# Patient Record
Sex: MALE | Race: OTHER | HISPANIC OR LATINO | Employment: FULL TIME | URBAN - METROPOLITAN AREA
[De-identification: names, ages, dates, MRNs, and addresses within clinical notes are randomized per-mention and may not be internally consistent; named-entity substitution may affect disease eponyms.]

---

## 2018-01-17 ENCOUNTER — APPOINTMENT (EMERGENCY)
Dept: RADIOLOGY | Facility: HOSPITAL | Age: 33
End: 2018-01-17
Payer: COMMERCIAL

## 2018-01-17 ENCOUNTER — HOSPITAL ENCOUNTER (EMERGENCY)
Facility: HOSPITAL | Age: 33
Discharge: HOME | End: 2018-01-17
Admitting: EMERGENCY MEDICINE
Payer: COMMERCIAL

## 2018-01-17 VITALS
DIASTOLIC BLOOD PRESSURE: 70 MMHG | TEMPERATURE: 98.5 F | OXYGEN SATURATION: 97 % | WEIGHT: 253.4 LBS | HEART RATE: 104 BPM | SYSTOLIC BLOOD PRESSURE: 156 MMHG | RESPIRATION RATE: 20 BRPM

## 2018-01-17 DIAGNOSIS — J40 BRONCHITIS: Primary | ICD-10-CM

## 2018-01-17 PROCEDURE — 99283 EMERGENCY DEPT VISIT LOW MDM: CPT

## 2018-01-17 PROCEDURE — 71046 X-RAY EXAM CHEST 2 VIEWS: CPT

## 2018-01-17 RX ORDER — BENZONATATE 100 MG/1
100 CAPSULE ORAL 3 TIMES DAILY PRN
Qty: 15 CAPSULE | Refills: 0 | Status: SHIPPED | OUTPATIENT
Start: 2018-01-17 | End: 2018-01-22

## 2018-01-17 RX ORDER — ALBUTEROL SULFATE 90 UG/1
2 AEROSOL, METERED RESPIRATORY (INHALATION) EVERY 4 HOURS
COMMUNITY
Start: 2017-12-27 | End: 2018-02-19 | Stop reason: ALTCHOICE

## 2018-01-17 RX ORDER — ALBUTEROL SULFATE 2.5 MG/3ML
2.5 SOLUTION RESPIRATORY (INHALATION) EVERY 6 HOURS PRN
COMMUNITY
End: 2018-02-19 | Stop reason: ALTCHOICE

## 2018-01-17 RX ORDER — GUAIFENESIN 600 MG
600 TABLET, EXTENDED RELEASE 12 HR ORAL EVERY 12 HOURS SCHEDULED
Qty: 28 TABLET | Refills: 0 | Status: SHIPPED | OUTPATIENT
Start: 2018-01-17 | End: 2018-01-31

## 2018-01-17 NOTE — ED PROVIDER NOTES
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  Patient reports that 1 week ago he was cleaning out a room around a cat litter box where he is allergic to cats  Patient states that since then he has been having irritation in his lungs  Patient reports that he feels a "vibrating sensation" when he coughs  States that he also has shortness of breath likely secondary to his cough  Patient also reports nausea  Patient states that he used his nebulizer treatment yesterday with relief  Patient states that he denies any nasal congestion, fever, sore throat, abdominal pain, chest pain  Patient denies being overly stressed  Patient states that he smokes 3 cigarettes a day but "quit" 1 week ago after he started having his current symptoms  Patient states no else around her has this  Denies any fever, chills, abdominal pain  Prior to Admission Medications   Prescriptions Last Dose Informant Patient Reported? Taking? albuterol (2 5 mg/3 mL) 0 083 % nebulizer solution   Yes Yes   Sig: Take 2 5 mg by nebulization every 6 (six) hours as needed for wheezing   albuterol (PROVENTIL HFA,VENTOLIN HFA) 90 mcg/act inhaler   Yes Yes   Sig: Inhale 2 puffs every 4 (four) hours   fluticasone-salmeterol (ADVAIR DISKUS) 500-50 mcg/dose   Yes Yes   Sig: Inhale 1 puff      Facility-Administered Medications: None       Past Medical History:   Diagnosis Date    Asthma        History reviewed  No pertinent surgical history  History reviewed  No pertinent family history    I have reviewed and agree with the history as documented  Social History   Substance Use Topics    Smoking status: Former Smoker    Smokeless tobacco: Never Used    Alcohol use No        Review of Systems   Constitutional: Negative for chills and fever  HENT: Negative for congestion and sore throat  Respiratory: Positive for cough, chest tightness, shortness of breath and wheezing  Cardiovascular: Negative for chest pain  Gastrointestinal: Positive for nausea  Negative for abdominal pain, constipation, diarrhea and vomiting  Musculoskeletal: Negative  Skin: Negative  Physical Exam  ED Triage Vitals [01/17/18 1644]   Temperature Pulse Respirations Blood Pressure SpO2   98 5 °F (36 9 °C) 104 20 156/70 97 %      Temp Source Heart Rate Source Patient Position - Orthostatic VS BP Location FiO2 (%)   Oral Monitor Sitting Right arm --      Pain Score       No Pain           Orthostatic Vital Signs  Vitals:    01/17/18 1644   BP: 156/70   Pulse: 104   Patient Position - Orthostatic VS: Sitting       Physical Exam   Constitutional: He is oriented to person, place, and time  He appears well-developed and well-nourished  He is cooperative  No distress  HENT:   Head: Normocephalic and atraumatic  Eyes: Conjunctivae and EOM are normal  Pupils are equal, round, and reactive to light  Neck: Normal range of motion  Neck supple  Cardiovascular: Normal rate and normal heart sounds  No murmur heard  Pulmonary/Chest: Effort normal and breath sounds normal  He has no wheezes  He exhibits no tenderness  Musculoskeletal: Normal range of motion  Neurological: He is alert and oriented to person, place, and time  Skin: Skin is warm  No rash noted  He is not diaphoretic  No erythema  Psychiatric: He has a normal mood and affect  Vitals reviewed        ED Medications  Medications - No data to display    Diagnostic Studies  Results Reviewed     None                 XR chest 2 views   ED Interpretation by Bernardino England ALICIA (01/17 1832)   Possible RLL infiltrate      Final Result by Marin Arora MD (01/17 1913)      No active pulmonary disease  Workstation performed: [id number]                    Procedures  Procedures       Phone Contacts  ED Phone Contact    ED Course  ED Course                                MDM  CritCare Time    Disposition  Final diagnoses:   Bronchitis     Time reflects when diagnosis was documented in both MDM as applicable and the Disposition within this note     Time User Action Codes Description Comment    1/17/2018  6:33 PM Anastacio Muhammadly Add [J18 9] CAP (community acquired pneumonia)     1/17/2018  6:35 PM Anastacio Earthly Remove [J18 9] CAP (community acquired pneumonia)     1/17/2018  6:35 PM Bhaskar, [address] [facility] Bronchitis       ED Disposition     ED Disposition Condition Comment    Discharge  Reshma Reyes discharge to home/self care  Condition at discharge: Good        Follow-up Information     Follow up With Specialties Details Why [address]  Schedule an appointment as soon as possible for a visit in 2 days Follow up with a family doctor in 1 week for recheck of symptoms    [address] [place] [address] 610          Discharge Medication List as of 1/17/2018  6:37 PM      START taking these medications    Details   benzonatate (TESSALON PERLES) 100 mg capsule Take 1 capsule by mouth 3 (three) times a day as needed for cough for up to 5 days, Starting Wed 1/17/2018, Until Mon 1/22/2018, Print      guaiFENesin (MUCINEX) 600 mg 12 hr tablet Take 1 tablet by mouth every 12 (twelve) hours for 14 days, Starting Wed 1/17/2018, Until Wed 1/31/2018, Print         CONTINUE these medications which have NOT CHANGED    Details   albuterol (2 5 mg/3 mL) 0 083 % nebulizer solution Take 2 5 mg by nebulization every 6 (six) hours as needed for wheezing, Historical Med      albuterol (PROVENTIL HFA,VENTOLIN HFA) 90 mcg/act inhaler Inhale 2 puffs every 4 (four) hours, Starting Wed 12/27/2017, Historical Med      fluticasone-salmeterol (ADVAIR DISKUS) 500-50 mcg/dose Inhale 1 puff, Starting Tue 7/18/2017, Until Wed 7/18/2018, Historical Med           No discharge procedures on file      ED Provider  Electronically Signed by           Rachna John PA-C  01/17/18 9952

## 2018-01-17 NOTE — DISCHARGE INSTRUCTIONS
Acute Bronchitis   WHAT YOU NEED TO KNOW:   What is acute bronchitis? Acute bronchitis is swelling and irritation in the air passages of your lungs  This irritation may cause you to cough or have other breathing problems  Acute bronchitis often starts because of another illness, such as a cold or the flu  The illness spreads from your nose and throat to your windpipe and airways  Bronchitis is often called a chest cold  Acute bronchitis lasts about 3 to 6 weeks and is usually not a serious illness  What causes acute bronchitis? · Infection  caused by a virus, bacteria, or a fungus    · Polluted air  caused by chemical fumes, dust, smoke, allergens, or pollution  What increases my risk for acute bronchitis? · Age, usually older adults    · Smoking cigarettes or being around cigarette smoke    · Chronic lung diseases or chronic sinus infections    · Weakened immune system    · Gastroesophageal reflux disease    · Allergies and environmental changes  What are the signs and symptoms of acute bronchitis? · A cough with sputum that may be clear, yellow, or green    · Feeling more tired than usual, and body aches    · A fever and chills    · Wheezing when you breathe    · A tight chest or pain when you breathe or cough  How is acute bronchitis diagnosed? Your healthcare provider may diagnose bronchitis by your symptoms  If he is not sure, you may need the following:  · Blood tests  will be done to see if your symptoms are caused by an infection  · X-ray  pictures of your lungs and heart may show signs of infection, such as pneumonia  Chest x-rays may also show fluid around your heart and lungs  How is acute bronchitis treated? Your healthcare provider will treat any condition that has caused your acute bronchitis  He may also give you any of the following:  · Ibuprofen or acetaminophen  are medicines that help lower your fever  They are available without a doctor's order   Ask your healthcare provider which medicine is right for you  Ask how much to take and how often to take it  Follow directions  These medicines can cause stomach bleeding if not taken correctly  Ibuprofen can cause kidney damage  Do not take ibuprofen if you have kidney disease, an ulcer, or allergies to aspirin  Acetaminophen can cause liver damage  Do not take more than 4,000 milligrams in 24 hours  · Decongestants  help loosen mucus in your lungs and make it easier to cough up  This can help you breathe easier  · Cough suppressants  decrease your urge to cough  If your cough produces mucus, do not take a cough suppressant unless your healthcare provider tells you to  Your healthcare provider may suggest that you take a cough suppressant at night so you can rest     · Inhalers  may be given  Your healthcare provider may give you one or more inhalers to help you breathe easier and cough less  An inhaler gives your medicine to open your airways  Ask your healthcare provider to show you how to use your inhaler correctly  How can I care for myself when I have acute bronchitis? · Get more rest   Rest helps your body to heal  Slowly start to do more each day  Rest when you feel it is needed  · Avoid irritants in the air  Avoid chemicals, fumes, and dust  Wear a face mask if you must work around dust or fumes  Stay inside on days when air pollution levels are high  If you have allergies, stay inside when pollen counts are high  Do not use aerosol products, such as spray-on deodorant, bug spray, and hair spray  · Do not smoke or be around others who smoke  Nicotine and other chemicals in cigarettes and cigars damages the cilia that move mucus out of your lungs  Ask your healthcare provider for information if you currently smoke and need help to quit  E-cigarettes or smokeless tobacco still contain nicotine  Talk to your healthcare provider before you use these products  · Drink liquids as directed    Liquids help keep your air passages moist and help you cough up mucus  You may need to drink more liquids when you have acute bronchitis  Ask how much liquid to drink each day and which liquids are best for you  · Use a humidifier or vaporizer  Use a cool mist humidifier or a vaporizer to increase air moisture in your home  This may make it easier for you to breathe and help decrease your cough  How can I decrease my risk for acute bronchitis? · Get the vaccinations you need  Ask your healthcare provider if you should get vaccinated against the flu or pneumonia  · Prevent the spread of germs  You can decrease your risk of acute bronchitis and other illnesses by doing the following:     [facility] your hands often with soap and water  Carry germ-killing hand lotion or gel with you  You can use the lotion or gel to clean your hands when soap and water are not available  ¨ Do not touch your eyes, nose, or mouth unless you have washed your hands first     ¨ Always cover your mouth when you cough to prevent the spread of germs  It is best to cough into a tissue or your shirt sleeve instead of into your hand  Ask those around you cover their mouths when they cough  ¨ Try to avoid people who have a cold or the flu  If you are sick, stay away from others as much as possible  When should I seek immediate care? · You cough up blood  · Your lips or fingernails turn blue  · You feel like you are not getting enough air when you breathe  When should I contact my healthcare provider? · You have a fever  · Your breathing problems do not go away or get worse  · Your cough does not get better within 4 weeks  · You have questions or concerns about your condition or care  CARE AGREEMENT:   You have the right to help plan your care  Learn about your health condition and how it may be treated  Discuss treatment options with your caregivers to decide what care you want to receive  You always have the right to refuse treatment  The above information is an  only  It is not intended as medical advice for individual conditions or treatments  Talk to your doctor, nurse or pharmacist before following any medical regimen to see if it is safe and effective for you  © 2017 [employer] [employer] Information is for End User's use only and may not be sold, redistributed or otherwise used for commercial purposes  All illustrations and images included in CareNotes® are the copyrighted property of A [employer]  or [employer]

## 2018-02-19 ENCOUNTER — HOSPITAL ENCOUNTER (EMERGENCY)
Facility: HOSPITAL | Age: 33
Discharge: HOME | End: 2018-02-19
Admitting: EMERGENCY MEDICINE
Payer: COMMERCIAL

## 2018-02-19 VITALS
RESPIRATION RATE: 18 BRPM | DIASTOLIC BLOOD PRESSURE: 78 MMHG | SYSTOLIC BLOOD PRESSURE: 136 MMHG | TEMPERATURE: 97.5 F | HEART RATE: 97 BPM | OXYGEN SATURATION: 99 %

## 2018-02-19 DIAGNOSIS — L03.019 PARONYCHIA OF FINGER: Primary | ICD-10-CM

## 2018-02-19 PROCEDURE — 99283 EMERGENCY DEPT VISIT LOW MDM: CPT

## 2018-02-19 RX ORDER — CEPHALEXIN 500 MG/1
500 CAPSULE ORAL EVERY 8 HOURS SCHEDULED
Qty: 21 CAPSULE | Refills: 0 | Status: SHIPPED | OUTPATIENT
Start: 2018-02-19 | End: 2018-02-26

## 2018-02-19 RX ORDER — ALBUTEROL SULFATE 90 UG/1
2 AEROSOL, METERED RESPIRATORY (INHALATION) EVERY 6 HOURS PRN
Qty: 6.7 G | Refills: 0 | Status: SHIPPED | OUTPATIENT
Start: 2018-02-19

## 2018-02-19 RX ADMIN — Medication 1 APPLICATION: at 12:25

## 2018-02-19 NOTE — DISCHARGE INSTRUCTIONS
Paronychia   WHAT YOU NEED TO KNOW:   Paronychia is an infection of your nail fold caused by bacteria or a fungus  The nail fold is the skin around your nail  Paronychia may happen suddenly and last for 6 weeks or longer  You may have paronychia on more than 1 finger or toe  DISCHARGE INSTRUCTIONS:   Medicines:   · Td vaccine  is a booster shot used to help prevent tetanus and diphtheria  The Td booster may be given to adolescents and adults every 10 years or for certain wounds and injuries  · Antibiotics: This medicine will help fight or prevent an infection  It may be given as a pill, cream, or ointment  · Steroids: This medicine will help decrease inflammation  It may be given as a pill, cream, or ointment  · Antifungal medicine: This medicine helps kill fungus that may be causing your infection  It may be given as a cream or ointment  · NSAIDs:  These medicines decrease pain and swelling  NSAIDs are available without a doctor's order  Ask your healthcare provider which medicine is right for you  Ask how much to take and when to take it  Take as directed  NSAIDs can cause stomach bleeding and kidney problems if not taken correctly  · Take your medicine as directed  Contact your healthcare provider if you think your medicine is not helping or if you have side effects  Tell him of her if you are allergic to any medicine  Keep a list of the medicines, vitamins, and herbs you take  Include the amounts, and when and why you take them  Bring the list or the pill bottles to follow-up visits  Carry your medicine list with you in case of an emergency  Follow up with your healthcare provider as directed:  Write down your questions so you remember to ask them during your visits  Self-care:   · Soak your nail:  Soak your nail in a mixture of equal parts vinegar and water 3 or 4 times each day  This will help decrease inflammation      · Apply a warm compress:  Soak a washcloth in warm water and place it on your nail  This will help decrease inflammation  · Elevate:  Raise your nail above the level of your heart as often as you can  This will help decrease swelling and pain  Prop your nail on pillows or blankets to keep it elevated comfortably  · Use lotion:  Apply lotion after you wash your hands  This will prevent your skin from becoming too dry  Prevent paronychia:   · Avoid chemicals and allergens that may harm your skin and nails  This includes soaps, laundry detergents, and nail products  · Keep your nails clean and dry  Avoid soaking your nails in water  Use cotton-lined rubber gloves or wear 2 rubber gloves if you work with food or water  The gloves will help protect your nail folds  · Keep your nails short  Do not bite your nails, pick at your hangnails, suck your fingers, or wear fake nails  Bring your own nail tools when you go to the nail salon  Contact your healthcare provider if:   · Your nail becomes loose, deformed, or falls off  · You have a large abscess on your nail  · You have questions or concerns about your condition or care  Return to the emergency department if:   · You have severe nail pain  · The inflammation spreads to your hand or arm  © 2017 [address] [facility] Information is for End User's use only and may not be sold, redistributed or otherwise used for commercial purposes  All illustrations and images included in CareNotes® are the copyrighted property of [employer]  or [employer]  The above information is an  only  It is not intended as medical advice for individual conditions or treatments  Talk to your doctor, nurse or pharmacist before following any medical regimen to see if it is safe and effective for you

## 2018-02-19 NOTE — ED PROVIDER NOTES
History  Chief Complaint   Patient presents with    Finger Swelling     swelling redness to distal 2nd digit left hand  33year old male presents today complaining of redness and swelling to left 2nd digit  Noticed it 6 days ago  Denies fevers or drainage but admits to tenderness to palpation  Admits to "picking the skin around it"  None       Past Medical History:   Diagnosis Date    Asthma        History reviewed  No pertinent surgical history  History reviewed  No pertinent family history  I have reviewed and agree with the history as documented  Social History   Substance Use Topics    Smoking status: Former Smoker    Smokeless tobacco: Never Used    Alcohol use No        Review of Systems   Skin: Positive for color change  All other systems reviewed and are negative  Physical Exam  ED Triage Vitals [02/19/18 1149]   Temperature Pulse Respirations Blood Pressure SpO2   97 5 °F (36 4 °C) 97 18 136/78 99 %      Temp Source Heart Rate Source Patient Position - Orthostatic VS BP Location FiO2 (%)   Temporal -- Sitting Right arm --      Pain Score       6           Orthostatic Vital Signs  Vitals:    02/19/18 1149   BP: 136/78   Pulse: 97   Patient Position - Orthostatic VS: Sitting       Physical Exam   Constitutional: He appears well-developed and well-nourished  No distress  HENT:   Head: Normocephalic and atraumatic  Eyes: Conjunctivae are normal    Cardiovascular: Normal rate  Pulmonary/Chest: No respiratory distress  Musculoskeletal:        Left hand: He exhibits tenderness  He exhibits normal range of motion, normal capillary refill and no swelling  Normal sensation noted  Hands:  Left 2nd digit with redness and swelling lateral to nailbed  No active drainage  Mild fluctuance  No streaking or decreased ROM  Neurological: He is alert  No sensory deficit  Skin: He is not diaphoretic  Psychiatric: He has a normal mood and affect         ED Medications  Medications   LET gel 1 application (1 application Topical Given 2/19/18 1225)       Diagnostic Studies  Results Reviewed     None                 No orders to display              Procedures  Incision/Drainage  Date/Time: 2/19/2018 1:09 PM  Performed by: Alla Smalls by: Brandt Lester     Patient location:  ED  Consent:     Consent obtained:  Verbal    Risks discussed:  Bleeding, incomplete drainage, infection and pain  Location:     Indications for incision and drainage: paronychia  Size:  <1cm    Location:  Upper extremity    Upper extremity location:  L index finger  Pre-procedure details:     Skin preparation:  Antiseptic wash  Anesthesia (see MAR for exact dosages): Anesthesia method:  Topical application    Topical anesthetic:  LET  Procedure details:     Scalpel blade:  11    Incision depth:  Skin    Drainage:  Bloody    Drainage amount:  Scant  Post-procedure details:     Patient tolerance of procedure: Tolerated well, no immediate complications           Phone Contacts  ED Phone Contact    ED Course  ED Course                                MDM  CritCare Time    Disposition  Final diagnoses:   Paronychia of finger     Time reflects when diagnosis was documented in both MDM as applicable and the Disposition within this note     Time User Action Codes Description Comment    2/19/2018 12:49 PM Danuta Damian [L03 [id number]] Paronychia of finger       ED Disposition     ED Disposition Condition Comment    Discharge  [facility] discharge to home/self care  Pt discharged by [facility] independently of nursing staff       Condition at discharge: Good        Follow-up Information     Follow up With Specialties Details Why [address]  Schedule an appointment as soon as possible for a visit  [address]  [phone]          Discharge Medication List as of 2/19/2018 12:51 PM      START taking these medications    Details albuterol (PROVENTIL HFA,VENTOLIN HFA) 90 mcg/act inhaler Inhale 2 puffs every 6 (six) hours as needed for wheezing, Starting Mon 2/19/2018, Print      cephalexin (KEFLEX) 500 mg capsule Take 1 capsule (500 mg total) by mouth every 8 (eight) hours for 7 days, Starting Mon 2/19/2018, Until Mon 2/26/2018, Print           No discharge procedures on file      ED Provider  Electronically Signed by           Evelyn Acosta PA-C  02/19/18 2727

## 2018-03-08 ENCOUNTER — APPOINTMENT (EMERGENCY)
Dept: CT IMAGING | Facility: HOSPITAL | Age: 33
End: 2018-03-08
Payer: COMMERCIAL

## 2018-03-08 ENCOUNTER — HOSPITAL ENCOUNTER (EMERGENCY)
Facility: HOSPITAL | Age: 33
Discharge: HOME | End: 2018-03-08
Admitting: EMERGENCY MEDICINE
Payer: COMMERCIAL

## 2018-03-08 VITALS
OXYGEN SATURATION: 95 % | DIASTOLIC BLOOD PRESSURE: 76 MMHG | TEMPERATURE: 98.3 F | WEIGHT: 256 LBS | RESPIRATION RATE: 18 BRPM | HEART RATE: 88 BPM | SYSTOLIC BLOOD PRESSURE: 126 MMHG

## 2018-03-08 DIAGNOSIS — J02.0 STREP PHARYNGITIS: ICD-10-CM

## 2018-03-08 DIAGNOSIS — J36 TONSILLAR ABSCESS: Primary | ICD-10-CM

## 2018-03-08 LAB
ANION GAP SERPL CALCULATED.3IONS-SCNC: 10 MMOL/L (ref 4–13)
BASOPHILS # BLD AUTO: 0.01 THOUSANDS/ΜL (ref 0–0.1)
BASOPHILS NFR BLD AUTO: 0 % (ref 0–1)
BUN SERPL-MCNC: 16 MG/DL (ref 5–25)
CALCIUM SERPL-MCNC: 9.5 MG/DL (ref 8.3–10.1)
CHLORIDE SERPL-SCNC: 102 MMOL/L (ref 100–108)
CO2 SERPL-SCNC: 27 MMOL/L (ref 21–32)
CREAT SERPL-MCNC: 1.04 MG/DL (ref 0.6–1.3)
EOSINOPHIL # BLD AUTO: 0.19 THOUSAND/ΜL (ref 0–0.61)
EOSINOPHIL NFR BLD AUTO: 2 % (ref 0–6)
ERYTHROCYTE [DISTWIDTH] IN BLOOD BY AUTOMATED COUNT: 15.1 % (ref 11.6–15.1)
GFR SERPL CREATININE-BSD FRML MDRD: 95 ML/MIN/1.73SQ M
GLUCOSE SERPL-MCNC: 112 MG/DL (ref 65–140)
HCT VFR BLD AUTO: 41.7 % (ref 36.5–49.3)
HGB BLD-MCNC: 13.9 G/DL (ref 12–17)
LYMPHOCYTES # BLD AUTO: 1.63 THOUSANDS/ΜL (ref 0.6–4.47)
LYMPHOCYTES NFR BLD AUTO: 18 % (ref 14–44)
MCH RBC QN AUTO: 27 PG (ref 26.8–34.3)
MCHC RBC AUTO-ENTMCNC: 33.3 G/DL (ref 31.4–37.4)
MCV RBC AUTO: 81 FL (ref 82–98)
MONOCYTES # BLD AUTO: 0.65 THOUSAND/ΜL (ref 0.17–1.22)
MONOCYTES NFR BLD AUTO: 7 % (ref 4–12)
NEUTROPHILS # BLD AUTO: 6.52 THOUSANDS/ΜL (ref 1.85–7.62)
NEUTS SEG NFR BLD AUTO: 73 % (ref 43–75)
NRBC BLD AUTO-RTO: 0 /100 WBCS
PLATELET # BLD AUTO: 164 THOUSANDS/UL (ref 149–390)
PMV BLD AUTO: 10.9 FL (ref 8.9–12.7)
POTASSIUM SERPL-SCNC: 3.9 MMOL/L (ref 3.5–5.3)
RBC # BLD AUTO: 5.15 MILLION/UL (ref 3.88–5.62)
S PYO AG THROAT QL: POSITIVE
SODIUM SERPL-SCNC: 139 MMOL/L (ref 136–145)
WBC # BLD AUTO: 9 THOUSAND/UL (ref 4.31–10.16)

## 2018-03-08 PROCEDURE — 87430 STREP A AG IA: CPT | Performed by: PHYSICIAN ASSISTANT

## 2018-03-08 PROCEDURE — 96365 THER/PROPH/DIAG IV INF INIT: CPT

## 2018-03-08 PROCEDURE — 36415 COLL VENOUS BLD VENIPUNCTURE: CPT | Performed by: PHYSICIAN ASSISTANT

## 2018-03-08 PROCEDURE — 85025 COMPLETE CBC W/AUTO DIFF WBC: CPT | Performed by: PHYSICIAN ASSISTANT

## 2018-03-08 PROCEDURE — 96361 HYDRATE IV INFUSION ADD-ON: CPT

## 2018-03-08 PROCEDURE — 96360 HYDRATION IV INFUSION INIT: CPT

## 2018-03-08 PROCEDURE — 70491 CT SOFT TISSUE NECK W/DYE: CPT

## 2018-03-08 PROCEDURE — 80048 BASIC METABOLIC PNL TOTAL CA: CPT | Performed by: PHYSICIAN ASSISTANT

## 2018-03-08 PROCEDURE — 87040 BLOOD CULTURE FOR BACTERIA: CPT | Performed by: PHYSICIAN ASSISTANT

## 2018-03-08 PROCEDURE — 99284 EMERGENCY DEPT VISIT MOD MDM: CPT

## 2018-03-08 RX ORDER — PREDNISONE 20 MG/1
40 TABLET ORAL DAILY
Qty: 10 TABLET | Refills: 0 | Status: SHIPPED | OUTPATIENT
Start: 2018-03-08 | End: 2018-03-13 | Stop reason: ALTCHOICE

## 2018-03-08 RX ORDER — CLINDAMYCIN HYDROCHLORIDE 300 MG/1
300 CAPSULE ORAL 3 TIMES DAILY
Qty: 30 CAPSULE | Refills: 0 | Status: SHIPPED | OUTPATIENT
Start: 2018-03-08 | End: 2018-03-18

## 2018-03-08 RX ORDER — KETOROLAC TROMETHAMINE 30 MG/ML
15 INJECTION, SOLUTION INTRAMUSCULAR; INTRAVENOUS ONCE
Status: DISCONTINUED | OUTPATIENT
Start: 2018-03-08 | End: 2018-03-08 | Stop reason: HOSPADM

## 2018-03-08 RX ORDER — CLINDAMYCIN PHOSPHATE 900 MG/50ML
900 INJECTION INTRAVENOUS ONCE
Status: COMPLETED | OUTPATIENT
Start: 2018-03-08 | End: 2018-03-08

## 2018-03-08 RX ADMIN — CLINDAMYCIN PHOSPHATE 900 MG: 18 INJECTION, SOLUTION INTRAMUSCULAR; INTRAVENOUS at 14:49

## 2018-03-08 RX ADMIN — IOHEXOL 85 ML: 350 INJECTION, SOLUTION INTRAVENOUS at 13:23

## 2018-03-08 RX ADMIN — DEXAMETHASONE SODIUM PHOSPHATE 10 MG: 10 INJECTION, SOLUTION INTRAMUSCULAR; INTRAVENOUS at 12:25

## 2018-03-08 RX ADMIN — SODIUM CHLORIDE 1000 ML: 0.9 INJECTION, SOLUTION INTRAVENOUS at 12:18

## 2018-03-08 NOTE — DISCHARGE INSTRUCTIONS
Peritonsillar Abscess   WHAT YOU NEED TO KNOW:   A peritonsillar abscess, or PTA, is a collection of pus in the peritonsillar space  The peritonsillar space is the area between your tonsil and the back wall of your throat  It is near the opening of the tubes leading to your stomach and lungs  DISCHARGE INSTRUCTIONS:   Call 911 if:   · You have trouble breathing  Return to the emergency department if:   · You have more pain, swelling, or redness in your throat  · Your symptoms get worse or do not get better, even with treatment  · You have difficulty or pain when you swallow, or you cannot eat or drink  Contact your healthcare provider if:   · Your abscess returns  · You have questions or concerns about your condition or care  Medicines:   · Antibiotics  help treat or prevent a bacterial infection  · Acetaminophen  decreases pain and fever  It is available without a doctor's order  Ask how much to take and how often to take it  Follow directions  Acetaminophen can cause liver damage if not taken correctly  · Steroids  decrease swelling  · NSAIDs , such as ibuprofen, help decrease swelling, pain, and fever  This medicine is available with or without a doctor's order  NSAIDs can cause stomach bleeding or kidney problems in certain people  If you take blood thinner medicine, always ask if NSAIDs are safe for you  Always read the medicine label and follow directions  Do not give these medicines to children under 10months of age without direction from your child's healthcare provider  · Take your medicine as directed  Contact your healthcare provider if you think your medicine is not helping or if you have side effects  Tell him or her if you are allergic to any medicine  Keep a list of the medicines, vitamins, and herbs you take  Include the amounts, and when and why you take them  Bring the list or the pill bottles to follow-up visits   Carry your medicine list with you in case of an emergency  Decrease your risk for a peritonsillar abscess:   · Care for your mouth and teeth  Brush and floss your teeth after you eat, and before you go to sleep  Gently brush your teeth and gums using a brush with soft bristles  Use a mouth rinse after you brush  See your dentist for regular check-ups  · Do not delay treatment for a sore throat  Make an appointment to see your doctor if you have a sore throat that continues for more than a few days  If you have a fever with a sore throat, call your doctor that day  Early treatment may prevent a peritonsillar abscess  Take your antibiotic for throat infections until it is done  · Do not smoke  Nicotine and other chemicals in cigarettes and cigars may increase your risk for a peritonsillar abscess  Ask your healthcare provider for information if you currently smoke and need help to quit  E-cigarettes or smokeless tobacco still contain nicotine  Talk to your healthcare provider before you use these products  Manage your symptoms:   · A liquid diet  may decrease your discomfort until the PTA is healed  A liquid diet may include jello, juices, or ice pops  Follow up with your healthcare provider as directed:  Write down your questions so you can remember to ask them during your visits  © 2017 [facility] Information is for End User's use only and may not be sold, redistributed or otherwise used for commercial purposes  All illustrations and images included in CareNotes® are the copyrighted property of [employer]  or [employer]  The above information is an  only  It is not intended as medical advice for individual conditions or treatments  Talk to your doctor, nurse or pharmacist before following any medical regimen to see if it is safe and effective for you  Strep Throat   WHAT YOU NEED TO KNOW:   Strep throat is a throat infection caused by bacteria  It is easily spread from person to person  DISCHARGE INSTRUCTIONS:   Call 911 for any of the following:   · You have trouble breathing  Return to the emergency department if:   · You have new symptoms like a bad headache, stiff neck, chest pain, or vomiting  · You are drooling because you cannot swallow your spit  Contact your healthcare provider if:   · You have a fever  · You have a rash or ear pain  · You have green, yellow-brown, or bloody mucus when you cough or blow your nose  · You are unable to drink anything  · You have questions or concerns about your condition or care  Medicines:   · Antibiotics  help treat your strep throat  You should feel better within 2 to 3 days after you start antibiotics  · Take your medicine as directed  Contact your healthcare provider if you think your medicine is not helping or if you have side effects  Tell him or her if you are allergic to any medicine  Keep a list of the medicines, vitamins, and herbs you take  Include the amounts, and when and why you take them  Bring the list or the pill bottles to follow-up visits  Carry your medicine list with you in case of an emergency  Manage your symptoms:   · Use lozenges, ice, soft foods, or popsicles  to soothe your throat  · Drink juice, milk shakes, or soup  if your throat is too sore to eat solid food  Drinking liquids can also help prevent dehydration  · Gargle with salt water  Mix ¼ teaspoon salt in a glass of warm water and gargle  This may help reduce swelling in your throat  · Do not smoke  Nicotine and other chemicals in cigarettes and cigars can cause lung damage and make your symptoms worse  Ask your healthcare provider for information if you currently smoke and need help to quit  E-cigarettes or smokeless tobacco still contain nicotine  Talk to your healthcare provider before you use these products  Return to work or school  24 hours after you start antibiotic medicine    Prevent the spread of strep throat:   · Wash your hands often  Use soap and water  Wash your hands after you use the bathroom, change a child's diapers, or sneeze  Wash your hands before you prepare or eat food  · Do not share food or drinks  Replace your toothbrush after you have taken antibiotics for 24 hours  Follow up with your healthcare provider as directed:  Write down your questions so you remember to ask them during your visits  © 2017 [facility] Information is for End User's use only and may not be sold, redistributed or otherwise used for commercial purposes  All illustrations and images included in CareNotes® are the copyrighted property of A [employer]  or [employer]  The above information is an  only  It is not intended as medical advice for individual conditions or treatments  Talk to your doctor, nurse or pharmacist before following any medical regimen to see if it is safe and effective for you  DISCHARGE INSTRUCTIONS:    FOLLOW UP WITH YOUR PRIMARY CARE PROVIDER OR THE [address] [place] [address]  MAKE AN APPOINTMENT TO BE SEEN  TAKE TYLENOL OR MOTRIN FOR PAIN  TAKE CLINDAMYCIN AS PRESCRIBED  IF RASH, SHORTNESS OF BREATH OR TROUBLE SWALLOWING, STOP TAKING THE MEDICATION AND BE SEEN  TAKE PREDNISONE AS PRESCRIBED  IF RASH, SHORTNESS OF BREATH OR TROUBLE SWALLOWING, STOP TAKING THE MEDICATION AND BE SEEN  FOLLOW UP WITH THE RECOMMENDED EARS, NOSE AND THROAT SPECIALIST  IF SYMPTOMS WORSEN OR NEW SYMPTOMS ARISE, RETURN TO THE ER TO BE SEEN

## 2018-03-08 NOTE — ED PROVIDER NOTES
History  Chief Complaint   Patient presents with    Sore Throat     1-2 weeks sore throat, worse on L side  denies fever/chills  reports nausea  change in voice quality     32y  o male with PMH of asthma presents to the ER for left sided throat pain for 2 weeks  Patient has been taking Aleve without relief  He describes his pain as "hurting"  He states his pain radiates into his ear  He rates his pain 4/10 and states it comes and goes  He has positive sick contacts but denies recent travel  Associated symptoms: rhinorrhea and nausea  He denies fever, chills, chest pain, dyspnea, vomiting, diarrhea, abdominal pain, weakness or paresthesias  History provided by:  Patient   used: No        Prior to Admission Medications   Prescriptions Last Dose Informant Patient Reported? Taking? albuterol (PROVENTIL HFA,VENTOLIN HFA) 90 mcg/act inhaler   No Yes   Sig: Inhale 2 puffs every 6 (six) hours as needed for wheezing      Facility-Administered Medications: None       Past Medical History:   Diagnosis Date    Asthma        History reviewed  No pertinent surgical history  History reviewed  No pertinent family history  I have reviewed and agree with the history as documented  Social History   Substance Use Topics    Smoking status: Former Smoker    Smokeless tobacco: Never Used    Alcohol use No        Review of Systems   Constitutional: Negative for activity change, appetite change, chills and fever  HENT: Positive for ear pain, rhinorrhea and sore throat  Negative for congestion, drooling, ear discharge and facial swelling  Eyes: Negative for redness  Respiratory: Negative for shortness of breath  Cardiovascular: Negative for chest pain  Gastrointestinal: Positive for nausea  Negative for abdominal pain, diarrhea and vomiting  Musculoskeletal: Negative for neck stiffness  Skin: Negative for rash  Allergic/Immunologic: Negative for food allergies     Neurological: Negative for weakness and numbness  Physical Exam  ED Triage Vitals   Temperature Pulse Respirations Blood Pressure SpO2   03/08/18 1052 03/08/18 1052 03/08/18 1052 03/08/18 1052 03/08/18 1052   98 3 °F (36 8 °C) 90 18 142/63 98 %      Temp src Heart Rate Source Patient Position - Orthostatic VS BP Location FiO2 (%)   -- 03/08/18 1539 03/08/18 1539 03/08/18 1539 --    Monitor Standing Left arm       Pain Score       03/08/18 1052       5           Orthostatic Vital Signs  Vitals:    03/08/18 1052 03/08/18 1226 03/08/18 1356 03/08/18 1539   BP: 142/63 144/82 135/71 126/76   Pulse: 90 81 90 88   Patient Position - Orthostatic VS:    Standing       Physical Exam   Constitutional:  Non-toxic appearance  No distress  HENT:   Head: Normocephalic and atraumatic  Right Ear: Tympanic membrane, external ear and ear canal normal  No drainage, swelling or tenderness  No foreign bodies  Tympanic membrane is not erythematous  No hemotympanum  Left Ear: Tympanic membrane, external ear and ear canal normal  No drainage, swelling or tenderness  No foreign bodies  Tympanic membrane is not erythematous  No hemotympanum  Nose: Nose normal    Mouth/Throat: Uvula is midline and mucous membranes are normal  No uvula swelling  Posterior oropharyngeal edema and posterior oropharyngeal erythema present  No tonsillar abscesses  Tonsils are 2+ on the right  Tonsils are 3+ on the left  No tonsillar exudate  Neck: Normal range of motion and phonation normal  Neck supple  No tracheal deviation present  Cardiovascular: Normal rate, regular rhythm, S1 normal, S2 normal and normal heart sounds  Exam reveals no gallop and no friction rub  No murmur heard  Pulmonary/Chest: Effort normal and breath sounds normal  No respiratory distress  He has no decreased breath sounds  He has no wheezes  He has no rhonchi  He has no rales  He exhibits no tenderness  Neurological: He is alert  GCS eye subscore is 4   GCS verbal subscore is 5  GCS motor subscore is 6  Skin: Skin is warm and dry  No rash noted  Psychiatric: He has a normal mood and affect  Nursing note and vitals reviewed  ED Medications  Medications   sodium chloride 0 9 % bolus 1,000 mL (0 mL Intravenous Stopped 3/8/18 1436)   dexamethasone 10 mg/mL oral liquid 10 mg 1 mL (10 mg Oral Given 3/8/18 1225)   iohexol (OMNIPAQUE) 350 MG/ML injection (MULTI-DOSE) 85 mL (85 mL Intravenous Given 3/8/18 1323)   clindamycin (CLEOCIN) IVPB (premix) 900 mg (0 mg Intravenous Stopped 3/8/18 1538)       Diagnostic Studies  Results Reviewed     Procedure Component Value Units Date/Time    Rapid Beta strep screen [[id number]]  (Abnormal) Collected:  03/08/18 1330    Lab Status:  Final result Specimen:  Throat from Throat Updated:  03/08/18 1345     Rapid Strep A Screen Positive (A)    Basic metabolic panel [[id number]] Collected:  03/08/18 1218    Lab Status:  Final result Specimen:  Blood from Arm, Right Updated:  03/08/18 1240     Sodium 139 mmol/L      Potassium 3 9 mmol/L      Chloride 102 mmol/L      CO2 27 mmol/L      Anion Gap 10 mmol/L      BUN 16 mg/dL      Creatinine 1 04 mg/dL      Glucose 112 mg/dL      Calcium 9 5 mg/dL      eGFR 95 ml/min/1 73sq m     Narrative:         National Kidney Disease Education Program recommendations are as follows:  GFR calculation is accurate only with a steady state creatinine  Chronic Kidney disease less than 60 ml/min/1 73 sq  meters  Kidney failure less than 15 ml/min/1 73 sq  meters      CBC and differential [[id number]]  (Abnormal) Collected:  03/08/18 1218    Lab Status:  Final result Specimen:  Blood from Arm, Right Updated:  03/08/18 1229     WBC 9 00 Thousand/uL      RBC 5 15 Million/uL      Hemoglobin 13 9 g/dL      Hematocrit 41 7 %      MCV 81 (L) fL      MCH 27 0 pg      MCHC 33 3 g/dL      RDW 15 1 %      MPV 10 9 fL      Platelets 165 Thousands/uL      nRBC 0 /100 WBCs      Neutrophils Relative 73 %      Lymphocytes Relative 18 % Monocytes Relative 7 %      Eosinophils Relative 2 %      Basophils Relative 0 %      Neutrophils Absolute 6 52 Thousands/µL      Lymphocytes Absolute 1 63 Thousands/µL      Monocytes Absolute 0 65 Thousand/µL      Eosinophils Absolute 0 19 Thousand/µL      Basophils Absolute 0 01 Thousands/µL     Blood culture #1 [[id number]] Collected:  03/08/18 1221    Lab Status: In process Specimen:  Blood from Arm, Right Updated:  03/08/18 1225    Blood culture #2 [[id number]] Collected:  03/08/18 1218    Lab Status: In process Specimen:  Blood from Line, Venous Updated:  03/08/18 1225                 CT soft tissue neck with contrast   Final Result by Fior Norwood MD (03/08 1351)      Tonsillar enlargement more notable on the left than the right with a very small curvilinear left tonsillar collection, approximately 4 mm in thickness is consistent with small left tonsillar abscess  No larger collection and specifically no    peritonsillar abscess  Reactive prominence of adenoids and enlargement of upper neck nonnecrotic lymph nodes  Workstation performed: [id number]                    Procedures  Procedures       Phone Contacts  ED Phone Contact    ED Course  ED Course                                MDM  Number of Diagnoses or Management Options  Strep pharyngitis: new and requires workup  Tonsillar abscess: new and requires workup  Diagnosis management comments: DDX consists of but not limited to: strep, abscess, mono, viral syndrome    Will check CBC, BMP, CT neck, blood cultures and strep  Will give Toradol, Decadron and fluids  At discharge, I instructed the patient to:  -follow up with pcp  -take Tylenol or Motrin for pain  -take Clindamycin as prescribed  -take Prednisone as prescribed  -follow up with the recommended ENT specialist  -rest and drink plenty of fluids  -return to the ER if symptoms worsened or new symptoms arose  Patient agreed to this plan and was stable at time of discharge         Amount and/or Complexity of Data Reviewed  Clinical lab tests: ordered and reviewed  Tests in the radiology section of CPT®: ordered and reviewed    Patient Progress  Patient progress: stable    CritCare Time    Disposition  Final diagnoses: Tonsillar abscess   Strep pharyngitis     Time reflects when diagnosis was documented in both MDM as applicable and the Disposition within this note     Time User Action Codes Description Comment    3/8/2018  2:26 PM Ariadna Aguilar Add [J36] Tonsillar abscess     3/8/2018  2:28 PM Germaine MORRELL Add [J02 0] Strep pharyngitis       ED Disposition     ED Disposition Condition Comment    Discharge  Lilliana Doe discharge to home/self care  Condition at discharge: Stable        Follow-up Information     Follow up With Specialties Details Why [facility] Schedule an appointment as soon as possible for a visit in 1 day  [address] [place] [address]  [phone]  27, DO Otolaryngology Schedule an appointment as soon as possible for a visit in 1 day  Jaimie Mccullough [address]  [phone]          Discharge Medication List as of 3/8/2018  2:30 PM      START taking these medications    Details   clindamycin (CLEOCIN) 300 MG capsule Take 1 capsule (300 mg total) by mouth 3 (three) times a day for 10 days, Starting Thu 3/8/2018, Until Sun 3/18/2018, Print      predniSONE 20 mg tablet Take 2 tablets (40 mg total) by mouth daily for 5 days, Starting Thu 3/8/2018, Until Tue 3/13/2018, Print         CONTINUE these medications which have NOT CHANGED    Details   albuterol (PROVENTIL HFA,VENTOLIN HFA) 90 mcg/act inhaler Inhale 2 puffs every 6 (six) hours as needed for wheezing, Starting Mon 2/19/2018, Print           No discharge procedures on file      ED Provider  Electronically Signed by           Orly Mckeon PA-C  03/08/18 0999

## 2018-03-13 LAB
BACTERIA BLD CULT: NORMAL
BACTERIA BLD CULT: NORMAL

## 2021-04-20 ENCOUNTER — HOSPITAL ENCOUNTER (EMERGENCY)
Facility: HOSPITAL | Age: 36
Discharge: HOME | End: 2021-04-20
Attending: EMERGENCY MEDICINE | Admitting: EMERGENCY MEDICINE

## 2021-04-20 VITALS
OXYGEN SATURATION: 98 % | DIASTOLIC BLOOD PRESSURE: 80 MMHG | RESPIRATION RATE: 16 BRPM | TEMPERATURE: 97.9 F | HEART RATE: 78 BPM | SYSTOLIC BLOOD PRESSURE: 111 MMHG

## 2021-04-20 DIAGNOSIS — M54.50 ACUTE LEFT-SIDED LOW BACK PAIN WITHOUT SCIATICA: Primary | ICD-10-CM

## 2021-04-20 PROCEDURE — 99283 EMERGENCY DEPT VISIT LOW MDM: CPT

## 2021-04-20 PROCEDURE — 99284 EMERGENCY DEPT VISIT MOD MDM: CPT | Performed by: EMERGENCY MEDICINE

## 2021-04-20 RX ORDER — LIDOCAINE 50 MG/G
1 PATCH TOPICAL ONCE
Status: DISCONTINUED | OUTPATIENT
Start: 2021-04-20 | End: 2021-04-20 | Stop reason: HOSPADM

## 2021-04-20 RX ORDER — NAPROXEN 500 MG/1
500 TABLET ORAL 2 TIMES DAILY WITH MEALS
Qty: 30 TABLET | Refills: 0 | Status: SHIPPED | OUTPATIENT
Start: 2021-04-20 | End: 2022-04-27 | Stop reason: HOSPADM

## 2021-04-20 RX ORDER — ACETAMINOPHEN 325 MG/1
975 TABLET ORAL ONCE
Status: COMPLETED | OUTPATIENT
Start: 2021-04-20 | End: 2021-04-20

## 2021-04-20 RX ORDER — KETOROLAC TROMETHAMINE 30 MG/ML
30 INJECTION, SOLUTION INTRAMUSCULAR; INTRAVENOUS ONCE
Status: DISCONTINUED | OUTPATIENT
Start: 2021-04-20 | End: 2021-04-20

## 2021-04-20 RX ADMIN — ACETAMINOPHEN 975 MG: 325 TABLET, FILM COATED ORAL at 16:42

## 2021-04-20 NOTE — DISCHARGE INSTRUCTIONS
You were seen in the ED today for your back pain  At this time you can continue to use aleve (naproxen) 500 mg twice a day  Take this medication with food as it can irritate the stomach  I have provided you with information to follow up with physical therapy if you would like  You should also establish care with a primary care provider  Reeturn to the ED if you develop any urinary retention or incontinence

## 2021-04-20 NOTE — ED ATTENDING ATTESTATION
Final Diagnosis:  1  Acute left-sided low back pain without sciatica           ICharlene MD, saw and evaluated the patient  All available labs and X-rays were ordered by me or the resident and have been reviewed by myself  I discussed the patient with the resident / non-physician and agree with the resident's / non-physician practitioner's findings and plan as documented in the resident's / non-physician practicitioner's note, except where noted  At this point, I agree with the current assessment done in the ED  I was present during key portions of all procedures performed unless otherwise stated  Chief Complaint   Patient presents with    Back Pain     pt states he has back pain starting last night  pt plays [employer] football on sundays and says he got hit hard  pt also lifts boxes at work  This is a 30 y o  male presenting for evaluation of back pain  The patient states that he was playing flag football yesterday, he was site tackled someone and since has been having persistent left sided paravertebral back pain  No bowel or bladder incontinence  No numbness tingling down the arms or legs  Able to walk around but when he is twisting or moving it hurts more  His job involves lifting boxes at work so came in for evaluation  No medications apart from Advil use which did help  LEFT sided discomfort  PMH:   has a past medical history of Asthma  PSH:   has no past surgical history on file  Social:  Social History     Substance and Sexual Activity   Alcohol Use No     Social History     Tobacco Use   Smoking Status Former Smoker   Smokeless Tobacco Never Used     Social History     Substance and Sexual Activity   Drug Use No     PE:  Vitals:    04/20/21 1523   BP: 111/80   BP Location: Left arm   Pulse: 78   Resp: 16   Temp: 97 9 °F (36 6 °C)   TempSrc: Tympanic   SpO2: 98%   General: VS reviewed  Appears in NAD  awake, alert  Well-nourished, well-developed  Appears stated age  Speaking normally in full sentences  Head: Normocephalic, atraumatic  Eyes: EOM-I  No diplopia  No hyphema  No subconjunctival hemorrhages  Symmetrical lids  ENT: Atraumatic external nose and ears  MMM  No malocclusion  No stridor  Normal phonation  No drooling  Normal swallowing  Neck: No JVD  CV: No pallor noted  Lungs:   No tachypnea  No respiratory distress  Back: no abrasions/ecchymosis  No C/T/L-spine tenderness  MSK:   FROM spontaneously  EHL/FHL/PF/DF/KF/KE/HF/HE 5/5  No saddle anesthesia  2+ patellar reflexes  Has LEFT paravertebral tenderness  Skin: Dry, intact  Neuro: Awake, alert, GCS15, CN II-XII grossly intact  Motor grossly intact  Psychiatric/Behavioral: Appropriate mood and affect   Exam: deferred  A:  - LEFT back pain/tenderness  P:  - Supportive measures  - no red flags  - work note     - 13 point ROS was performed and all are normal unless stated in the history above  - Nursing note reviewed  Vitals reviewed  - Orders placed by myself and/or advanced practitioner / resident     - Previous chart was reviewed  - No language barrier    - History obtained from patient  - There are no limitations to the history obtained  - Critical care time: Not applicable for this patient  Code Status: No Order  Advance Directive and Living Will:      Power of :    POLST:      Medications   acetaminophen (TYLENOL) tablet 975 mg (has no administration in time range)   lidocaine (LIDODERM) 5 % patch 1 patch (has no administration in time range)     No orders to display     No orders of the defined types were placed in this encounter      Labs Reviewed - No data to display  Time reflects when diagnosis was documented in both MDM as applicable and the Disposition within this note     Time User Action Codes Description Comment    4/20/2021  4:30 PM Liya Alonso Add [M54 5] Acute left-sided low back pain without sciatica       ED Disposition     ED Disposition Condition Date/Time Comment    Discharge Stable Tue Apr 20, 2021  4:30 PM Shine Avelar discharge to home/self care  Follow-up Information     Follow up With Specialties Details Why Contact Info Additional Information    Physical Therapy at [address] [facility] Physical Therapy Call  to discuss physical therapy for back pain Raoul Shepard 76  [phone] Physical Therapy at [address] [facility], [address], [address]    [address] [place] [address] [address] [facility] Emergency Department Emergency Medicine Go to  If symptoms worsen, As needed [address]  [address] [facility] 64 [facility] Emergency Department, [address], [place], [place], [facility] 108    [address]  Call in 1 day to establish care with a primary care provider [phone]           Patient's Medications   Discharge Prescriptions    NAPROXEN (NAPROSYN) 500 MG TABLET    Take 1 tablet (500 mg total) by mouth 2 (two) times a day with meals       Start Date: 4/20/2021 End Date: --       Order Dose: 500 mg       Quantity: 30 tablet    Refills: 0     No discharge procedures on file  Prior to Admission Medications   Prescriptions Last Dose Informant Patient Reported? Taking? albuterol (PROVENTIL HFA,VENTOLIN HFA) 90 mcg/act inhaler   No No   Sig: Inhale 2 puffs every 6 (six) hours as needed for wheezing      Facility-Administered Medications: None       Portions of the record may have been created with voice recognition software  Occasional wrong word or "sound a like" substitutions may have occurred due to the inherent limitations of voice recognition software  Read the chart carefully and recognize, using context, where substitutions have occurred      Electronically signed by:  Pako Mckeon

## 2021-04-20 NOTE — Clinical Note
Carmela Cardozo was seen and treated in our emergency department on 4/20/2021  Diagnosis: Lumbar strain of lower back  Cindy Lorenz  may return to work on return date  He may return on this date: 04/23/2021    Limited duty preferred  If you have any questions or concerns, please don't hesitate to call        Waldo Dowling MD    ______________________________           _______________          _______________  Hospital Representative                              Date                                Time

## 2021-04-20 NOTE — ED PROVIDER NOTES
History  Chief Complaint   Patient presents with    Back Pain     pt states he has back pain starting last night  pt plays flag football on sundays and says he got hit hard  pt also lifts boxes at work  30year old male no significant medical history presenting for back pain  Patient was playing flag football on Sunday and was hit in the side  Also works in a warehouse lifting heavy boxes  Has pain in the lower left back that was relieved with aleve  - Patient has no TUNA FISH red flags: Trauma, unexplained weight loss, neurologic symptoms / retention / overflow incontinence, Age > 48, Fever/night sweats, IVDU, chronic steroids, nor hx of cancer (prostate, renal, breast, lung)            Prior to Admission Medications   Prescriptions Last Dose Informant Patient Reported? Taking? albuterol (PROVENTIL HFA,VENTOLIN HFA) 90 mcg/act inhaler   No No   Sig: Inhale 2 puffs every 6 (six) hours as needed for wheezing      Facility-Administered Medications: None       Past Medical History:   Diagnosis Date    Asthma        History reviewed  No pertinent surgical history  History reviewed  No pertinent family history  I have reviewed and agree with the history as documented  E-Cigarette/Vaping     E-Cigarette/Vaping Substances     Social History     Tobacco Use    Smoking status: Former Smoker    Smokeless tobacco: Never Used   Substance Use Topics    Alcohol use: No    Drug use: No        Review of Systems   Constitutional: Negative for chills and fever  HENT: Negative for hearing loss  Eyes: Negative for visual disturbance  Respiratory: Negative for shortness of breath  Cardiovascular: Negative for chest pain  Gastrointestinal: Negative for abdominal pain, constipation, diarrhea, nausea and vomiting  Genitourinary: Negative for difficulty urinating  Musculoskeletal: Positive for back pain  Negative for myalgias  Skin: Negative for rash  Neurological: Negative for dizziness  Psychiatric/Behavioral: Negative for agitation  All other systems reviewed and are negative  Physical Exam  ED Triage Vitals [04/20/21 1523]   Temperature Pulse Respirations Blood Pressure SpO2   97 9 °F (36 6 °C) 78 16 111/80 98 %      Temp Source Heart Rate Source Patient Position - Orthostatic VS BP Location FiO2 (%)   Tympanic Monitor Sitting Left arm --      Pain Score       6             Orthostatic Vital Signs  Vitals:    04/20/21 1523   BP: 111/80   Pulse: 78   Patient Position - Orthostatic VS: Sitting       Physical Exam  Constitutional:       General: He is not in acute distress  Appearance: Normal appearance  He is obese  He is not ill-appearing  HENT:      Head: Normocephalic and atraumatic  Eyes:      Extraocular Movements: Extraocular movements intact  Conjunctiva/sclera: Conjunctivae normal       Pupils: Pupils are equal, round, and reactive to light  Neck:      Musculoskeletal: Normal range of motion  No neck rigidity  Cardiovascular:      Rate and Rhythm: Normal rate and regular rhythm  Pulses: Normal pulses  Heart sounds: Normal heart sounds  Pulmonary:      Effort: Pulmonary effort is normal       Breath sounds: Normal breath sounds  Abdominal:      General: Abdomen is flat  Bowel sounds are normal  There is no distension  Palpations: Abdomen is soft  Tenderness: There is no abdominal tenderness  Musculoskeletal: Normal range of motion  General: Tenderness present  Comments: No CT or L-spine tenderness  Patient has tenderness over the left lumbar region  No sciatica present  Skin:     General: Skin is warm and dry  Capillary Refill: Capillary refill takes less than 2 seconds  Neurological:      General: No focal deficit present  Mental Status: He is alert and oriented to person, place, and time  Mental status is at baseline  Cranial Nerves: No cranial nerve deficit  Motor: No weakness        Gait: Gait normal    Psychiatric:         Mood and Affect: Mood normal          Behavior: Behavior normal          ED Medications  Medications   acetaminophen (TYLENOL) tablet 975 mg (975 mg Oral Given 4/20/21 1642)       Diagnostic Studies  Results Reviewed     None                 No orders to display         Procedures  Procedures      ED Course                                       MDM  Number of Diagnoses or Management Options  Acute left-sided low back pain without sciatica:   Diagnosis management comments: 52-year-old male presenting to ED today with musculoskeletal back pain  At this time will treat him with Tylenol and Lidoderm patch  Patient refused Toradol because his twin brother has an allergy to NSAIDs  I discussed with the patient that he should continue using naproxen at home b i d  500 mg with meals  I also given information for physical therapy in the event that he would wish to use it  S so provided the patient with information to follow-up the primary care provider  Patient was given return precautions and discharged home  Disposition  Final diagnoses:   Acute left-sided low back pain without sciatica     Time reflects when diagnosis was documented in both MDM as applicable and the Disposition within this note     Time User Action Codes Description Comment    4/20/2021  4:30 PM Alveda Fraction Add [M54 5] Acute left-sided low back pain without sciatica       ED Disposition     ED Disposition Condition Date/Time Comment    Discharge Stable Tue Apr 20, 2021  4:30 PM Juma Devlin discharge to home/self care              Follow-up Information     Follow up With Specialties Details Why Contact Info Additional Information    Physical Therapy at [address] [place] [address] Physical Therapy Call  to discuss physical therapy for back pain Raoul Shepard 76  [phone] Physical Therapy at [address] [place] [address], [phone] 624 [facility] Emergency Department Emergency Medicine Go to  If symptoms worsen, As needed [address]  [address] Emergency Department, 261 [facility], [place], [place], [place] [address]    Kiko Norwood  Call in 1 day to establish care with a primary care provider [phone]             Discharge Medication List as of 4/20/2021  4:34 PM      START taking these medications    Details   naproxen (NAPROSYN) 500 mg tablet Take 1 tablet (500 mg total) by mouth 2 (two) times a day with meals, Starting Tue 4/20/2021, Print         CONTINUE these medications which have NOT CHANGED    Details   albuterol (PROVENTIL HFA,VENTOLIN HFA) 90 mcg/act inhaler Inhale 2 puffs every 6 (six) hours as needed for wheezing, Starting Mon 2/19/2018, Print           No discharge procedures on file  PDMP Review     None           ED Provider  Attending physically available and evaluated Dickarina Roqueabby FLETCHER managed the patient along with the ED Attending      Electronically Signed by         Celina Lanza MD  04/21/21 3231

## 2021-05-04 ENCOUNTER — HOSPITAL ENCOUNTER (EMERGENCY)
Facility: HOSPITAL | Age: 36
Discharge: HOME | End: 2021-05-04
Attending: EMERGENCY MEDICINE

## 2021-05-04 VITALS
BODY MASS INDEX: 35.61 KG/M2 | RESPIRATION RATE: 16 BRPM | SYSTOLIC BLOOD PRESSURE: 146 MMHG | TEMPERATURE: 98.2 F | HEIGHT: 68 IN | HEART RATE: 110 BPM | WEIGHT: 235 LBS | OXYGEN SATURATION: 95 % | DIASTOLIC BLOOD PRESSURE: 72 MMHG

## 2021-05-04 DIAGNOSIS — S61.215A LACERATION OF LEFT RING FINGER WITHOUT FOREIGN BODY WITHOUT DAMAGE TO NAIL, INITIAL ENCOUNTER: Primary | ICD-10-CM

## 2021-05-04 PROCEDURE — 90715 TDAP VACCINE 7 YRS/> IM: CPT | Performed by: EMERGENCY MEDICINE

## 2021-05-04 PROCEDURE — 12001 RPR S/N/AX/GEN/TRNK 2.5CM/<: CPT | Performed by: EMERGENCY MEDICINE

## 2021-05-04 PROCEDURE — 99282 EMERGENCY DEPT VISIT SF MDM: CPT

## 2021-05-04 PROCEDURE — 99282 EMERGENCY DEPT VISIT SF MDM: CPT | Performed by: EMERGENCY MEDICINE

## 2021-05-04 PROCEDURE — 90471 IMMUNIZATION ADMIN: CPT

## 2021-05-04 RX ADMIN — TETANUS TOXOID, REDUCED DIPHTHERIA TOXOID AND ACELLULAR PERTUSSIS VACCINE, ADSORBED 0.5 ML: 5; 2.5; 8; 8; 2.5 SUSPENSION INTRAMUSCULAR at 16:39

## 2021-05-05 NOTE — ED PROVIDER NOTES
History  Chief Complaint   Patient presents with    Hand Laceration     L ring finger with knife     Patient is a 71-year-old male who is otherwise healthy that presents for evaluation of left finger laceration  Patient was opening a can of/just just prior to ED arrival when he suffered a lac to his left 4th finger dorsal surface  Patient had mild bleeding initially with pressure held  He denies decreased range of motion of the finger  He is unsure tetanus status  He denies any other injuries  No blood thinners or antiplatelet agents  Prior to Admission Medications   Prescriptions Last Dose Informant Patient Reported? Taking? albuterol (PROVENTIL HFA,VENTOLIN HFA) 90 mcg/act inhaler   No No   Sig: Inhale 2 puffs every 6 (six) hours as needed for wheezing   naproxen (NAPROSYN) 500 mg tablet   No No   Sig: Take 1 tablet (500 mg total) by mouth 2 (two) times a day with meals      Facility-Administered Medications: None       Past Medical History:   Diagnosis Date    Asthma        History reviewed  No pertinent surgical history  History reviewed  No pertinent family history  I have reviewed and agree with the history as documented  E-Cigarette/Vaping     E-Cigarette/Vaping Substances     Social History     Tobacco Use    Smoking status: Former Smoker    Smokeless tobacco: Never Used   Substance Use Topics    Alcohol use: No    Drug use: No        Review of Systems   Constitutional: Negative for chills, diaphoresis, fatigue and fever  HENT: Negative for drooling, facial swelling, sore throat and trouble swallowing  Eyes: Negative for photophobia  Respiratory: Negative for cough, choking, chest tightness, shortness of breath, wheezing and stridor  Cardiovascular: Negative for chest pain, palpitations and leg swelling  Gastrointestinal: Negative for abdominal distention, abdominal pain, diarrhea, nausea and vomiting  Genitourinary: Negative for dysuria     Musculoskeletal: Negative for back pain, neck pain and neck stiffness  Left ring finger laceration   Skin: Negative for color change, pallor and rash  Neurological: Negative for dizziness, speech difficulty, weakness, light-headedness, numbness and headaches  Hematological: Negative for adenopathy  Psychiatric/Behavioral: Negative for agitation  All other systems reviewed and are negative  Physical Exam  ED Triage Vitals [05/04/21 1634]   Temperature Pulse Respirations Blood Pressure SpO2   98 2 °F (36 8 °C) (!) 110 16 146/72 95 %      Temp Source Heart Rate Source Patient Position - Orthostatic VS BP Location FiO2 (%)   Tympanic Monitor -- -- --      Pain Score       4             Orthostatic Vital Signs  Vitals:    05/04/21 1634   BP: 146/72   Pulse: (!) 110       Physical Exam  Vitals signs reviewed  Constitutional:       General: He is not in acute distress  Appearance: He is well-developed  HENT:      Head: Normocephalic  Eyes:      Pupils: Pupils are equal, round, and reactive to light  Neck:      Musculoskeletal: Normal range of motion and neck supple  Cardiovascular:      Rate and Rhythm: Normal rate and regular rhythm  Heart sounds: Normal heart sounds  No murmur  No friction rub  No gallop  Pulmonary:      Effort: Pulmonary effort is normal       Breath sounds: Normal breath sounds  Abdominal:      General: Bowel sounds are normal  There is no distension  Palpations: Abdomen is soft  Tenderness: There is no abdominal tenderness  There is no guarding  Musculoskeletal: Normal range of motion  Comments: Left ring finger:  Patient was superficial laceration to his left 4th middle phalanges dorsal aspect  Full range of motion of extension and flexion of the D IP  Intact sensation and cap refill noted  Skin:     Capillary Refill: Capillary refill takes less than 2 seconds  Neurological:      Mental Status: He is alert and oriented to person, place, and time  Cranial Nerves: No cranial nerve deficit  Sensory: No sensory deficit  Motor: No abnormal muscle tone  Psychiatric:         Behavior: Behavior normal          Thought Content: Thought content normal          Judgment: Judgment normal          ED Medications  Medications   tetanus-diphtheria-acellular pertussis (BOOSTRIX) IM injection 0 5 mL (0 5 mL Intramuscular Given 5/4/21 2626)       Diagnostic Studies  Results Reviewed     None                 No orders to display         Procedures  Laceration repair    Date/Time: 5/4/2021 9:00 PM  Performed by: Lakeshia Hamilton MD  Authorized by: Lakeshia Hamilton MD   Consent: Verbal consent obtained  Risks and benefits: risks, benefits and alternatives were discussed  Patient identity confirmed: verbally with patient and arm band  Body area: upper extremity  Location details: left ring finger  Laceration length: 1 5 cm  Foreign bodies: no foreign bodies  Tendon involvement: none  Nerve involvement: none  Vascular damage: no    Sedation:  Patient sedated: no      Wound Dehiscence:  Superficial Wound Dehiscence: simple closure      Procedure Details:  Preparation: Patient was prepped and draped in the usual sterile fashion  Irrigation solution: saline  Irrigation method: syringe  Amount of cleaning: standard  Debridement: none  Degree of undermining: none  Skin closure: glue  Approximation: close  Approximation difficulty: simple            ED Course                             SBIRT 22yo+      Most Recent Value   SBIRT (23 yo +)   In order to provide better care to our patients, we are screening all of our patients for alcohol and drug use  Would it be okay to ask you these screening questions?   No Filed at: 05/04/2021 1642                MDM  Number of Diagnoses or Management Options  Laceration of left ring finger without foreign body without damage to nail, initial encounter:   Diagnosis management comments: Patient is a 30year old male who presents with superficial laceration of left ring finger  No bleeding noted at the time with no evidence of tendon or vascular involvement  Patient had laceration repaired with glue with adequate outcome  Patient was also updated with tetanus patient was advised to return to care if he develops any new or worsening symptoms  Disposition  Final diagnoses:   Laceration of left ring finger without foreign body without damage to nail, initial encounter     Time reflects when diagnosis was documented in both MDM as applicable and the Disposition within this note     Time User Action Codes Description Comment    5/4/2021  5:03 PM Anselmo Ortega Add [W26 041A] Laceration of left ring finger without foreign body without damage to nail, initial encounter       ED Disposition     ED Disposition Condition Date/Time Comment    Discharge Stable Tue May 4, 2021  5:03 PM Marley De La Vega discharge to home/self care  Follow-up Information     Follow up With Specialties Details Why Contact Info Additional [address] Emergency Department Emergency Medicine  If symptoms worsen [address] [place] [address] Emergency Department, [address] [place], [place], [address] [phone]          Discharge Medication List as of 5/4/2021  5:03 PM      CONTINUE these medications which have NOT CHANGED    Details   albuterol (PROVENTIL HFA,VENTOLIN HFA) 90 mcg/act inhaler Inhale 2 puffs every 6 (six) hours as needed for wheezing, Starting Mon 2/19/2018, Print      naproxen (NAPROSYN) 500 mg tablet Take 1 tablet (500 mg total) by mouth 2 (two) times a day with meals, Starting Tue 4/20/2021, Print           No discharge procedures on file  PDMP Review     None           ED Provider  Attending physically available and evaluated Marley De La Vega I managed the patient along with the ED Attending      Electronically Signed by         Konrad Stewart Sobeida Hall MD  05/04/21 9930

## 2021-05-08 NOTE — ED ATTENDING ATTESTATION
5/4/2021  IEllis MD, saw and evaluated the patient  I have discussed the patient with the resident/non-physician practitioner and agree with the resident's/non-physician practitioner's findings, Plan of Care, and MDM as documented in the resident's/non-physician practitioner's note, except where noted  All available labs and Radiology studies were reviewed  I was present for key portions of any procedure(s) performed by the resident/non-physician practitioner and I was immediately available to provide assistance  At this point I agree with the current assessment done in the Emergency Department  I have conducted an independent evaluation of this patient a history and physical is as follows:    ED Course    28-year-old male with a left finger laceration  Examination remarkable for laceration left 4th finger dorsal surface  Mild bleeding controlled with pressure  Tetanus updated in ED  Examination of left ring finger shows a superficial laceration 1 5 cm in length full range of motion neurovascularly intact no tendon involvement  Normal cap refill  Impression:  Finger laceration  Update tetanus in ED  Will perform primary closure with tissue adhesive  Discharged home    Return precautions given  Critical Care Time  Procedures

## 2021-09-20 ENCOUNTER — OFFICE VISIT (OUTPATIENT)
Dept: URGENT CARE | Facility: MEDICAL CENTER | Age: 36
End: 2021-09-20

## 2021-09-20 VITALS
HEART RATE: 103 BPM | HEIGHT: 68 IN | SYSTOLIC BLOOD PRESSURE: 137 MMHG | OXYGEN SATURATION: 98 % | DIASTOLIC BLOOD PRESSURE: 75 MMHG | WEIGHT: 245 LBS | TEMPERATURE: 97.6 F | BODY MASS INDEX: 37.13 KG/M2 | RESPIRATION RATE: 18 BRPM

## 2021-09-20 DIAGNOSIS — R10.12 LEFT UPPER QUADRANT ABDOMINAL PAIN: Primary | ICD-10-CM

## 2021-09-20 PROCEDURE — 99213 OFFICE O/P EST LOW 20 MIN: CPT | Performed by: PHYSICIAN ASSISTANT

## 2021-09-20 NOTE — PROGRESS NOTES
[facility]        NAME: Beth Mcgill is a 30 y o  male  : 1985    MRN: [id number]  DATE: 2021  TIME: 4:18 PM    Assessment and Plan   Left upper quadrant abdominal pain [R10 12]  1  Left upper quadrant abdominal pain           Patient Instructions       Follow up with PCP in 3-5 days  Proceed to  ER if symptoms worsen  Chief Complaint     Chief Complaint   Patient presents with    Abdominal Pain     left sided abdominal pain that started x 2 hours ago          History of Present Illness         Patient for evaluation of left upper abdominal pain  Patient states he had this once before and then up she is being some gas pains  He states he came on about 2 hours ago and since and has resolved  He denies any nausea, vomiting  Review of Systems   Review of Systems   Constitutional: Negative  Respiratory: Negative  Cardiovascular: Negative  Gastrointestinal: Positive for abdominal pain  Negative for blood in stool, constipation, diarrhea, nausea and vomiting  Genitourinary: Negative  Musculoskeletal: Negative  Current Medications       Current Outpatient Medications:     albuterol (PROVENTIL HFA,VENTOLIN HFA) 90 mcg/act inhaler, Inhale 2 puffs every 6 (six) hours as needed for wheezing, Disp: 6 7 g, Rfl: 0    naproxen (NAPROSYN) 500 mg tablet, Take 1 tablet (500 mg total) by mouth 2 (two) times a day with meals, Disp: 30 tablet, Rfl: 0    Current Allergies     Allergies as of 2021 - Reviewed 2021   Allergen Reaction Noted    Pollen extract Other (See Comments) 2020            The following portions of the patient's history were reviewed and updated as appropriate: allergies, current medications, past family history, past medical history, past social history, past surgical history and problem list      Past Medical History:   Diagnosis Date    Asthma        History reviewed  No pertinent surgical history  History reviewed   No pertinent family history  Medications have been verified  Objective   /75   Pulse 103   Temp 97 6 °F (36 4 °C)   Resp 18   Ht 5' 8" (1 727 m)   Wt 111 kg (245 lb)   SpO2 98%   BMI 37 25 kg/m²   No LMP for male patient  Physical Exam     Physical Exam  Vitals and nursing note reviewed  Constitutional:       General: He is not in acute distress  Appearance: Normal appearance  He is well-developed  He is not ill-appearing, toxic-appearing or diaphoretic  HENT:      Head: Normocephalic and atraumatic  Eyes:      Extraocular Movements: Extraocular movements intact  Conjunctiva/sclera: Conjunctivae normal       Pupils: Pupils are equal, round, and reactive to light  Cardiovascular:      Rate and Rhythm: Normal rate and regular rhythm  Heart sounds: Normal heart sounds  Pulmonary:      Effort: Pulmonary effort is normal       Breath sounds: Normal breath sounds  Abdominal:      General: Abdomen is flat  Bowel sounds are normal  There is no distension  Palpations: Abdomen is soft  Tenderness: There is no abdominal tenderness  There is no guarding or rebound  Negative signs include Lai's sign, Rovsing's sign and McBurney's sign  Hernia: No hernia is present  Skin:     General: Skin is warm and dry  Neurological:      General: No focal deficit present  Mental Status: He is alert and oriented to person, place, and time  Psychiatric:         Mood and Affect: Mood normal          Behavior: Behavior normal          Thought Content:  Thought content normal          Judgment: Judgment normal

## 2021-09-20 NOTE — LETTER
September 20, 2021     Patient: Yuly Alcantara   YOB: 1985   Date of Visit: 9/20/2021       To Whom It May Concern:      Please excuse Yuly Alcantara  from work 09/20/2021  Patient may return to work on 09/21/2021           Sincerely,        Aron Kamara PA-C    CC: No Recipients

## 2021-12-06 ENCOUNTER — HOSPITAL ENCOUNTER (EMERGENCY)
Facility: HOSPITAL | Age: 36
Discharge: HOME | End: 2021-12-06
Attending: EMERGENCY MEDICINE

## 2021-12-06 ENCOUNTER — APPOINTMENT (EMERGENCY)
Dept: RADIOLOGY | Facility: HOSPITAL | Age: 36
End: 2021-12-06

## 2021-12-06 VITALS
SYSTOLIC BLOOD PRESSURE: 126 MMHG | HEIGHT: 68 IN | TEMPERATURE: 98.2 F | BODY MASS INDEX: 37.13 KG/M2 | WEIGHT: 245 LBS | RESPIRATION RATE: 20 BRPM | DIASTOLIC BLOOD PRESSURE: 70 MMHG | HEART RATE: 78 BPM | OXYGEN SATURATION: 97 %

## 2021-12-06 DIAGNOSIS — R05.9 COUGH: Primary | ICD-10-CM

## 2021-12-06 DIAGNOSIS — J45.901 ASTHMA EXACERBATION: ICD-10-CM

## 2021-12-06 PROCEDURE — 71046 X-RAY EXAM CHEST 2 VIEWS: CPT

## 2021-12-06 PROCEDURE — 99283 EMERGENCY DEPT VISIT LOW MDM: CPT

## 2021-12-06 PROCEDURE — 99284 EMERGENCY DEPT VISIT MOD MDM: CPT | Performed by: EMERGENCY MEDICINE

## 2021-12-06 RX ORDER — LORATADINE AND PSEUDOEPHEDRINE 10; 240 MG/1; MG/1
1 TABLET, EXTENDED RELEASE ORAL ONCE
Status: COMPLETED | OUTPATIENT
Start: 2021-12-06 | End: 2021-12-06

## 2021-12-06 RX ORDER — PREDNISONE 20 MG/1
40 TABLET ORAL ONCE
Status: COMPLETED | OUTPATIENT
Start: 2021-12-06 | End: 2021-12-06

## 2021-12-06 RX ORDER — PREDNISONE 20 MG/1
40 TABLET ORAL DAILY
Qty: 8 TABLET | Refills: 0 | Status: SHIPPED | OUTPATIENT
Start: 2021-12-06 | End: 2021-12-10 | Stop reason: ALTCHOICE

## 2021-12-06 RX ORDER — LORATADINE AND PSEUDOEPHEDRINE 10; 240 MG/1; MG/1
1 TABLET, EXTENDED RELEASE ORAL DAILY
Qty: 10 TABLET | Refills: 0 | Status: SHIPPED | OUTPATIENT
Start: 2021-12-06 | End: 2021-12-16

## 2021-12-06 RX ADMIN — LORATADINE AND PSEUDOEPHEDRINE 1 TABLET: 10; 240 TABLET, EXTENDED RELEASE ORAL at 16:28

## 2021-12-06 RX ADMIN — PREDNISONE 40 MG: 20 TABLET ORAL at 16:28

## 2022-01-31 ENCOUNTER — HOSPITAL ENCOUNTER (EMERGENCY)
Facility: HOSPITAL | Age: 37
Discharge: HOME | End: 2022-01-31
Attending: EMERGENCY MEDICINE

## 2022-01-31 VITALS
DIASTOLIC BLOOD PRESSURE: 88 MMHG | RESPIRATION RATE: 20 BRPM | SYSTOLIC BLOOD PRESSURE: 144 MMHG | HEART RATE: 103 BPM | OXYGEN SATURATION: 98 % | TEMPERATURE: 98.4 F

## 2022-01-31 DIAGNOSIS — R05.9 COUGH: Primary | ICD-10-CM

## 2022-01-31 DIAGNOSIS — J45.909 ASTHMA: ICD-10-CM

## 2022-01-31 PROCEDURE — 93308 TTE F-UP OR LMTD: CPT | Performed by: EMERGENCY MEDICINE

## 2022-01-31 PROCEDURE — 99284 EMERGENCY DEPT VISIT MOD MDM: CPT | Performed by: EMERGENCY MEDICINE

## 2022-01-31 PROCEDURE — 99283 EMERGENCY DEPT VISIT LOW MDM: CPT

## 2022-01-31 RX ORDER — PREDNISONE 20 MG/1
40 TABLET ORAL ONCE
Status: COMPLETED | OUTPATIENT
Start: 2022-01-31 | End: 2022-01-31

## 2022-01-31 RX ORDER — PREDNISONE 20 MG/1
20 TABLET ORAL DAILY
Qty: 3 TABLET | Refills: 0 | Status: SHIPPED | OUTPATIENT
Start: 2022-01-31 | End: 2022-04-27 | Stop reason: HOSPADM

## 2022-01-31 RX ORDER — MOMETASONE FUROATE 200 UG/1
1 AEROSOL RESPIRATORY (INHALATION) DAILY
Qty: 13 G | Refills: 0 | Status: SHIPPED | OUTPATIENT
Start: 2022-01-31

## 2022-01-31 RX ADMIN — PREDNISONE 40 MG: 20 TABLET ORAL at 08:53

## 2022-01-31 NOTE — DISCHARGE INSTRUCTIONS
Call the family practice clinic to establish definitive care for your asthma  Take the prednisone pill for the next 3 days  After the third day start taking the steroid inhaler once per day  Use the albuterol inhaler as a rescue inhaler

## 2022-01-31 NOTE — ED ATTENDING ATTESTATION
1/31/2022  ICarolyn MD, saw and evaluated the patient  I have discussed the patient with the resident/non-physician practitioner and agree with the resident's/non-physician practitioner's findings, Plan of Care, and MDM as documented in the resident's/non-physician practitioner's note, except where noted  All available labs and Radiology studies were reviewed  I was present for key portions of any procedure(s) performed by the resident/non-physician practitioner and I was immediately available to provide assistance  At this point I agree with the current assessment done in the Emergency Department  I have conducted an independent evaluation of this patient a history and physical is as follows:  Pt has ahd a dry nonproductive cough for 4 months Pt was seen in December and negative chest xray no uri Pt was given steroids with minimal relief  Pt feels more winded PT is smoker who quit when this started no hemoptysis no chest pain   Pt uses albuterol inhaler for asthma PE: alert nad heart reg lungs clear ent wnl MDM: will give inhaled steroids and refer  ED Course         Critical Care Time  Procedures

## 2022-01-31 NOTE — ED PROVIDER NOTES
History  Chief Complaint   Patient presents with    Cough     pt c o of cough for 4 months  pt states not getting better  pt denies sob or chest pain  30 yo M w/ hx of asthma presents w/ non-productive cough x 4 mo  Cough is worse in the morning, hacking, non-bloody, and unchanged in quality and severity over the past 4 mo  Pt fatigues more easily than normal  Pt using rescue inhaler daily  Does not follow up with PCP  Seen in ED 2 mo ago w/ unremarkable CXR and treated w/ outpatient PO steroids with minimal, brief improvement in sxs  Denies sick contacts, chest pain, dyspnea at rest, URI sxs, fevers, abdominal pain, or N/V/D  No family history of cardiomyopathy  Prior smoker  1/2 pack per day x 15 years  Stopped 4 mo ago  Prior to Admission Medications   Prescriptions Last Dose Informant Patient Reported? Taking? albuterol (PROVENTIL HFA,VENTOLIN HFA) 90 mcg/act inhaler   No No   Sig: Inhale 2 puffs every 6 (six) hours as needed for wheezing   naproxen (NAPROSYN) 500 mg tablet   No No   Sig: Take 1 tablet (500 mg total) by mouth 2 (two) times a day with meals      Facility-Administered Medications: None       Past Medical History:   Diagnosis Date    Asthma        No past surgical history on file  No family history on file  I have reviewed and agree with the history as documented  E-Cigarette/Vaping    E-Cigarette Use Never User      E-Cigarette/Vaping Substances     Social History     Tobacco Use    Smoking status: Current Every Day Smoker    Smokeless tobacco: Never Used   Vaping Use    Vaping Use: Never used   Substance Use Topics    Alcohol use: No    Drug use: No        Review of Systems   All other systems reviewed and are negative        Physical Exam  ED Triage Vitals [01/31/22 0815]   Temperature Pulse Respirations Blood Pressure SpO2   98 4 °F (36 9 °C) 103 20 144/88 98 %      Temp src Heart Rate Source Patient Position - Orthostatic VS BP Location FiO2 (%)   -- Monitor -- -- --      Pain Score       No Pain             Orthostatic Vital Signs  Vitals:    01/31/22 0815   BP: 144/88   Pulse: 103       Physical Exam  Vitals and nursing note reviewed  Constitutional:       General: He is not in acute distress  Appearance: Normal appearance  He is well-developed  He is obese  He is not ill-appearing, toxic-appearing or diaphoretic  HENT:      Head: Normocephalic and atraumatic  Right Ear: External ear normal       Left Ear: External ear normal       Nose: Nose normal  No congestion or rhinorrhea  Comments: No sinus tenderness     Mouth/Throat:      Mouth: Mucous membranes are moist       Pharynx: Oropharynx is clear  Posterior oropharyngeal erythema present  No oropharyngeal exudate  Eyes:      Conjunctiva/sclera: Conjunctivae normal       Pupils: Pupils are equal, round, and reactive to light  Cardiovascular:      Rate and Rhythm: Regular rhythm  Tachycardia present  Pulses: Normal pulses  Heart sounds: Normal heart sounds  No murmur heard  Pulmonary:      Effort: Pulmonary effort is normal  No respiratory distress  Breath sounds: Normal breath sounds  No stridor  No wheezing, rhonchi or rales  Chest:      Chest wall: No tenderness  Abdominal:      General: Bowel sounds are normal  There is no distension  Palpations: Abdomen is soft  Tenderness: There is no abdominal tenderness  Musculoskeletal:         General: Normal range of motion  Cervical back: Normal range of motion and neck supple  No rigidity  Skin:     General: Skin is warm and dry  Capillary Refill: Capillary refill takes less than 2 seconds  Neurological:      Mental Status: He is alert and oriented to person, place, and time     Psychiatric:         Mood and Affect: Mood normal          Behavior: Behavior normal          ED Medications  Medications   predniSONE tablet 40 mg (40 mg Oral Given 1/31/22 0853)       Diagnostic Studies  Results Reviewed     None                 No orders to display         Procedures  POC Cardiac US    Date/Time: 1/31/2022 9:14 AM  Performed by: Ariel Sprague MD  Authorized by: Ariel Sprague MD     Patient location:  ED  Other Assisting Provider: No    Procedure details:     Exam Type:  Diagnostic    Indications comment:  Chronic cough    Assessment / Evaluation for: cardiac function      Exam Type: initial exam      Image quality: limited diagnostic      Image availability:  Images available in PACS and still images obtained  Patient Details:     Cardiac Rhythm:  Regular    Mechanical ventilation: No    Cardiac findings:     Echo technique: limited 2D      Views obtained: parasternal long axis and parasternal short axis      Tamponade physiology: absent      Wall motion: normal      LV systolic function: normal      RV dilation: none            ED Course                             SBIRT 20yo+      Most Recent Value   SBIRT (24 yo +)    In order to provide better care to our patients, we are screening all of our patients for alcohol and drug use  Would it be okay to ask you these screening questions? Unable to answer at this time Filed at: 01/31/2022 0818                MDM  Number of Diagnoses or Management Options  Asthma: established and worsening  Cough: established and worsening  Diagnosis management comments: Impression: sxs likely due to inadequately-treated reactive airway disease, does not have PCP, uses daily albuterol inhaler daily x years  Well-appearing, no sinusitis or signs of HF  No family history of cardiac disease  Plan: prednisone in department, POC cardiac US, script sent for 3 days PO steroids and steroid inhaler, pt given info for  Family practice clinic to establish care for his chronic condition  Referral to Pulm         Amount and/or Complexity of Data Reviewed  Review and summarize past medical records: yes  Independent visualization of images, tracings, or specimens: yes    Risk of Complications, Morbidity, and/or Mortality  Presenting problems: low  Diagnostic procedures: minimal  Management options: minimal    Patient Progress  Patient progress: improved      Disposition  Final diagnoses:   Cough   Asthma     Time reflects when diagnosis was documented in both MDM as applicable and the Disposition within this note     Time User Action Codes Description Comment    1/31/2022  8:50 AM Sonja Eileen [R05 9] Cough     1/31/2022  9:16 AM Juliet Panchal Add [P97 168] Asthma       ED Disposition     ED Disposition Condition Date/Time Comment    Discharge Stable Mon Jan 31, 2022  8:49 AM Krystian Davis discharge to home/self care  Follow-up Information     Follow up With Specialties Details Why Contact Info Additional [address]  [facility] Family Medicine Call today  [address] [facility] [address]  [facility], [address] [place], [place], [place] [facility]    [address] [place] [address] 6 [facility],[address] Pulmonology Call today  [address] [facility] [address]  [address], [address] [facility], [place], [place], [address] [phone]          Patient's Medications   Discharge Prescriptions    ASMANEX  MCG/ACT AERO    Inhale 1 puff (200 mcg total) daily Rinse mouth after use  Start Date: 1/31/2022 End Date: --       Order Dose: 200 mcg       Quantity: 13 g    Refills: 0    PREDNISONE 20 MG TABLET    Take 1 tablet (20 mg total) by mouth daily for 3 days       Start Date: 1/31/2022 End Date: 2/3/2022       Order Dose: 20 mg       Quantity: 3 tablet    Refills: 0         PDMP Review     None           ED Provider  Attending physically available and evaluated Krystian Davis I managed the patient along with the ED Attending      Electronically Signed by         Joaquin Galeano MD  01/31/22 5244

## 2022-01-31 NOTE — Clinical Note
Lelia Mcpherson was seen and treated in our emergency department on 1/31/2022  No restrictions            Diagnosis:     Sindi Telol       He may return on this date: If you have any questions or concerns, please don't hesitate to call        Yovanny Lopez MD    ______________________________           _______________          _______________  Hospital Representative                              Date                                Time

## 2022-02-17 ENCOUNTER — TELEPHONE (OUTPATIENT)
Dept: PULMONOLOGY | Facility: CLINIC | Age: 37
End: 2022-02-17

## 2022-02-17 NOTE — TELEPHONE ENCOUNTER
Pt has an appointment with Dr Lawrence Manriquez on 2/18/22  LM for Pt to call back to provide his insurance information

## 2022-03-13 ENCOUNTER — HOSPITAL ENCOUNTER (EMERGENCY)
Facility: HOSPITAL | Age: 37
Discharge: HOME | End: 2022-03-14
Attending: EMERGENCY MEDICINE | Admitting: EMERGENCY MEDICINE
Payer: COMMERCIAL

## 2022-03-13 VITALS
RESPIRATION RATE: 18 BRPM | HEART RATE: 100 BPM | DIASTOLIC BLOOD PRESSURE: 79 MMHG | OXYGEN SATURATION: 98 % | BODY MASS INDEX: 40.32 KG/M2 | SYSTOLIC BLOOD PRESSURE: 132 MMHG | WEIGHT: 266 LBS | HEIGHT: 68 IN | TEMPERATURE: 97.7 F

## 2022-03-13 DIAGNOSIS — R21 RASH: Primary | ICD-10-CM

## 2022-03-13 DIAGNOSIS — L30.9 DERMATITIS: ICD-10-CM

## 2022-03-13 PROCEDURE — 99282 EMERGENCY DEPT VISIT SF MDM: CPT

## 2022-03-13 PROCEDURE — 99284 EMERGENCY DEPT VISIT MOD MDM: CPT | Performed by: EMERGENCY MEDICINE

## 2022-03-13 RX ORDER — PREDNISONE 10 MG/1
TABLET ORAL
Qty: 63 TABLET | Refills: 0 | Status: SHIPPED | OUTPATIENT
Start: 2022-03-13 | End: 2022-03-14 | Stop reason: SDUPTHER

## 2022-03-13 RX ORDER — PREDNISONE 20 MG/1
60 TABLET ORAL ONCE
Status: COMPLETED | OUTPATIENT
Start: 2022-03-14 | End: 2022-03-14

## 2022-03-14 RX ORDER — PREDNISONE 10 MG/1
TABLET ORAL
Qty: 63 TABLET | Refills: 0 | Status: SHIPPED | OUTPATIENT
Start: 2022-03-14 | End: 2022-04-27 | Stop reason: HOSPADM

## 2022-03-14 RX ORDER — PREDNISONE 10 MG/1
TABLET ORAL
Qty: 63 TABLET | Refills: 0 | Status: SHIPPED | OUTPATIENT
Start: 2022-03-14 | End: 2022-03-14 | Stop reason: SDUPTHER

## 2022-03-14 RX ADMIN — PREDNISONE 60 MG: 20 TABLET ORAL at 00:19

## 2022-03-14 NOTE — ED PROVIDER NOTES
History  Chief Complaint   Patient presents with    Rash     Pt c/o generalized rash that started on his right arm on tuesday and then over the week progressed to his left arm and his legs  Pt states rash is extremely itchy and he has tried benadryl and calomine lotion with no relief  61-year-old male presented to the emergency department for evaluation of a rash  The patient states that the rash 1st started approximately 5 days ago on his left arm  He states that since then the rash is spread 1st to his right arm went to his legs and now to his chest   He describes the rash as red bumps that are itchy  He states that he has been treating his symptoms with Benadryl, Zyrtec and calamine lotion  He states that he came to the emergency department today because his rash has not resolved  The patient reports that just prior to the rash starting he drank a pre workout drink that of new to him  He states that he has not had any other recent dietary changes  He also denies any recent changes to any detergents, shampoos or lotions  He also denies recent fevers, chills, nausea, vomiting, diarrhea, recent travel or any known exposures  Prior to Admission Medications   Prescriptions Last Dose Informant Patient Reported? Taking? Asmanex  MCG/ACT AERO   No No   Sig: Inhale 1 puff (200 mcg total) daily Rinse mouth after use  albuterol (PROVENTIL HFA,VENTOLIN HFA) 90 mcg/act inhaler   No No   Sig: Inhale 2 puffs every 6 (six) hours as needed for wheezing   naproxen (NAPROSYN) 500 mg tablet   No No   Sig: Take 1 tablet (500 mg total) by mouth 2 (two) times a day with meals   predniSONE 20 mg tablet   No No   Sig: Take 1 tablet (20 mg total) by mouth daily for 3 days      Facility-Administered Medications: None       Past Medical History:   Diagnosis Date    Asthma        History reviewed  No pertinent surgical history  History reviewed  No pertinent family history    I have reviewed and agree with the history as documented  E-Cigarette/Vaping    E-Cigarette Use Never User      E-Cigarette/Vaping Substances     Social History     Tobacco Use    Smoking status: Current Every Day Smoker     Packs/day: 0 25     Types: Cigarettes    Smokeless tobacco: Never Used   Vaping Use    Vaping Use: Never used   Substance Use Topics    Alcohol use: No    Drug use: No        Review of Systems   Constitutional: Negative for chills and fever  HENT: Negative for ear pain and sore throat  Eyes: Negative for pain and visual disturbance  Respiratory: Negative for cough and shortness of breath  Cardiovascular: Negative for chest pain and palpitations  Gastrointestinal: Negative for abdominal pain and vomiting  Genitourinary: Negative for dysuria and hematuria  Musculoskeletal: Negative for arthralgias and back pain  Skin: Positive for rash  Negative for color change  Neurological: Negative for seizures and syncope  All other systems reviewed and are negative  Physical Exam  ED Triage Vitals [03/13/22 2311]   Temperature Pulse Respirations Blood Pressure SpO2   97 7 °F (36 5 °C) 103 18 129/83 96 %      Temp Source Heart Rate Source Patient Position - Orthostatic VS BP Location FiO2 (%)   Temporal Monitor Lying Right arm --      Pain Score       No Pain             Orthostatic Vital Signs  Vitals:    03/13/22 2311 03/13/22 2332   BP: 129/83 132/79   Pulse: 103 100   Patient Position - Orthostatic VS: Lying Sitting       Physical Exam  Vitals and nursing note reviewed  Constitutional:       Appearance: He is well-developed  He is obese  HENT:      Head: Normocephalic and atraumatic  Eyes:      Conjunctiva/sclera: Conjunctivae normal    Cardiovascular:      Rate and Rhythm: Normal rate and regular rhythm  Heart sounds: No murmur heard  Pulmonary:      Effort: Pulmonary effort is normal  No respiratory distress  Breath sounds: Normal breath sounds     Abdominal: Palpations: Abdomen is soft  Tenderness: There is no abdominal tenderness  Musculoskeletal:      Cervical back: Neck supple  Skin:     General: Skin is warm and dry  Findings: Rash present  Rash is macular and papular  Neurological:      Mental Status: He is alert  ED Medications  Medications   predniSONE tablet 60 mg (60 mg Oral Given 3/14/22 0019)       Diagnostic Studies  Results Reviewed     None                 No orders to display         Procedures  Procedures      ED Course               SBIRT 22yo+      Most Recent Value   SBIRT (25 yo +)    In order to provide better care to our patients, we are screening all of our patients for alcohol and drug use  Would it be okay to ask you these screening questions? No Filed at: 03/14/2022 0020                MDM  Number of Diagnoses or Management Options  Dermatitis  Rash  Diagnosis management comments: 67-year-old male presented to the emergency department for evaluation of a rash  On arrival the patient was awake, alert, oriented and in no acute distress  The patient was noted to have a diffuse maculopapular rash  The patient will be given a dose of steroids here in the emergency department and will be prescribed a steroid taper  The patient is appropriate for discharge at this time with recommendation to establish care with a PCP and to follow-up  The patient was also provided with contact information for Dermatology  Return precautions were discussed  Patient agrees with the plan for discharge and feels comfortable to go home with proper f/u  Advised to return for worsening or additional problems  Diagnostic tests were reviewed and questions answered  Diagnosis, care plan and treatment options were discussed  The patient understands instructions and will follow up as directed          Disposition  Final diagnoses:   Rash   Dermatitis     Time reflects when diagnosis was documented in both MDM as applicable and the Disposition within this note     Time User Action Codes Description Comment    3/13/2022 11:57 PM Thao Pink Add [R21] Rash     3/14/2022 12:28 AM Thao Pink Add [L30 9] Dermatitis       ED Disposition     ED Disposition Condition Date/Time Comment    Discharge Stable Mon Mar 14, 2022 12:28 AM Héctor Kendell discharge to home/self care              Follow-up Information     Follow up With Specialties Details Why [address] [facility] Schedule an appointment as soon as possible for a visit   [address] [facility] [address] [facility] 54 [address]  [facility] [address], [address] [facility] [address], [place], [place], [address]   [address] Dermatology Schedule an appointment as soon as possible for a visit   [address] [facility] [address]  [address], [place], [address] [facility] Emergency Department Emergency Medicine Go to  If symptoms worsen [address]  [address] [facility] 64 [facility] Emergency Department, [address], [place], [address]   [phone]          Discharge Medication List as of 3/14/2022 12:32 AM      START taking these medications    Details   predniSONE 10 mg tablet 60 mg po x 3 days, 50 mg po x 3 days, 40 mg po x 3 days, 30 mg po x 3 days, 20 mg po x 3 days, 10 mg po x 3 days, Print         CONTINUE these medications which have NOT CHANGED    Details   albuterol (PROVENTIL HFA,VENTOLIN HFA) 90 mcg/act inhaler Inhale 2 puffs every 6 (six) hours as needed for wheezing, Starting Mon 2/19/2018, Print      Asmanex  MCG/ACT AERO Inhale 1 puff (200 mcg total) daily Rinse mouth after use , Starting Mon 1/31/2022, Normal      naproxen (NAPROSYN) 500 mg tablet Take 1 tablet (500 mg total) by mouth 2 (two) times a day with meals, Starting Tue 4/20/2021, Print      predniSONE 20 mg tablet Take 1 tablet (20 mg total) by mouth daily for 3 days, Starting Mon 1/31/2022, Until Thu 2/3/2022, Normal           No discharge procedures on file  PDMP Review     None           ED Provider  Attending physically available and evaluated Rudy Jef FLETCHER managed the patient along with the ED Attending      Electronically Signed by         Evelyn Baez MD  03/14/22 1483

## 2022-03-14 NOTE — ED NOTES
Pt states that the rash started Wednesday  He took a preworkout supplement on Monday and Tuesday which is new for him  He also works outside delivering packages to [employer] and wonders if it could be poison ivy, but he states that he wears sweatshirts due to the cold weather so is unsure how that could have happened  He states he took benadryl or zyrtex and applied topical ointment but nothing has helped        Angie Garcia RN  03/13/22 2342

## 2022-03-14 NOTE — ED ATTENDING ATTESTATION
3/13/2022  IParrish MD, saw and evaluated the patient  I have discussed the patient with the resident/non-physician practitioner and agree with the resident's/non-physician practitioner's findings, Plan of Care, and MDM as documented in the resident's/non-physician practitioner's note, except where noted  All available labs and Radiology studies were reviewed  I was present for key portions of any procedure(s) performed by the resident/non-physician practitioner and I was immediately available to provide assistance  At this point I agree with the current assessment done in the Emergency Department  I have conducted an independent evaluation of this patient a history and physical is as follows:    ED Course     Emergency Department Note- Sree Roblero 29 y o  male MRN: [id number]    Unit/Bed#: ED 02 Encounter: [id number]    Sree Roblero is a 29 y o  male who presents with   Chief Complaint   Patient presents with    Rash     Pt c/o generalized rash that started on his right arm on tuesday and then over the week progressed to his left arm and his legs  Pt states rash is extremely itchy and he has tried benadryl and calomine lotion with no relief  History of Present Illness   HPI:  Sree Roblero is a 29 y o  male who presents for evaluation of:  Diffuse pruritic rash that started on his arms and has spread to his legs, and torso  Patient has taken zyrtec, benadryl, and calamine lotion without response  Patient has a h/o asthma and environmental allergies  Patient denies difficulties breathing and lip swelling  Review of Systems   Constitutional: Negative for chills and fever  HENT: Negative for congestion and rhinorrhea  Respiratory: Negative for cough and shortness of breath  Gastrointestinal: Negative for nausea and vomiting  Musculoskeletal: Negative for back pain and neck pain  Skin: Positive for rash  Negative for wound     Neurological: Negative for light-headedness and headaches  All other systems reviewed and are negative  Historical Information   Past Medical History:   Diagnosis Date    Asthma      History reviewed  No pertinent surgical history  Social History   Social History     Substance and Sexual Activity   Alcohol Use No     Social History     Substance and Sexual Activity   Drug Use No     Social History     Tobacco Use   Smoking Status Current Every Day Smoker    Packs/day: 0 25    Types: Cigarettes   Smokeless Tobacco Never Used     Family History: History reviewed  No pertinent family history  Meds/Allergies   PTA meds:   Prior to Admission Medications   Prescriptions Last Dose Informant Patient Reported? Taking? Asmanex  MCG/ACT AERO   No No   Sig: Inhale 1 puff (200 mcg total) daily Rinse mouth after use     albuterol (PROVENTIL HFA,VENTOLIN HFA) 90 mcg/act inhaler   No No   Sig: Inhale 2 puffs every 6 (six) hours as needed for wheezing   naproxen (NAPROSYN) 500 mg tablet   No No   Sig: Take 1 tablet (500 mg total) by mouth 2 (two) times a day with meals   predniSONE 20 mg tablet   No No   Sig: Take 1 tablet (20 mg total) by mouth daily for 3 days      Facility-Administered Medications: None     Allergies   Allergen Reactions    Pollen Extract Other (See Comments)     Hives       Objective   First Vitals:   Blood Pressure: 129/83 (03/13/22 2311)  Pulse: 103 (03/13/22 2311)  Temperature: 97 7 °F (36 5 °C) (03/13/22 2311)  Temp Source: Temporal (03/13/22 2311)  Respirations: 18 (03/13/22 2311)  Height: 5' 8" (172 7 cm) (03/13/22 2311)  Weight - Scale: 121 kg (266 lb) (03/13/22 2311)  SpO2: 96 % (03/13/22 2311)    Current Vitals:   Blood Pressure: 132/79 (03/13/22 2332)  Pulse: 100 (03/13/22 2332)  Temperature: 97 7 °F (36 5 °C) (03/13/22 2311)  Temp Source: Temporal (03/13/22 2311)  Respirations: 18 (03/13/22 2332)  Height: 5' 8" (172 7 cm) (03/13/22 2311)  Weight - Scale: 121 kg (266 lb) (03/13/22 2311)  SpO2: 98 % (22 2334)    No intake or output data in the 24 hours ending 22 0119    Invasive Devices  Report    None                 Physical Exam  Vitals and nursing note reviewed  Constitutional:       General: He is not in acute distress  Appearance: Normal appearance  He is well-developed  HENT:      Head: Normocephalic and atraumatic  Right Ear: External ear normal       Left Ear: External ear normal       Nose: Nose normal       Mouth/Throat:      Pharynx: No oropharyngeal exudate  Eyes:      Conjunctiva/sclera: Conjunctivae normal       Pupils: Pupils are equal, round, and reactive to light  Cardiovascular:      Rate and Rhythm: Normal rate and regular rhythm  Pulmonary:      Effort: Pulmonary effort is normal  No respiratory distress  Abdominal:      General: Abdomen is flat  There is no distension  Musculoskeletal:         General: No deformity  Normal range of motion  Cervical back: Normal range of motion and neck supple  Skin:     General: Skin is warm and dry  Capillary Refill: Capillary refill takes less than 2 seconds  Findings: Rash (erythematous macules and papules) present  Neurological:      General: No focal deficit present  Mental Status: He is alert and oriented to person, place, and time  Mental status is at baseline  Coordination: Coordination normal    Psychiatric:         Mood and Affect: Mood normal          Behavior: Behavior normal          Thought Content: Thought content normal          Judgment: Judgment normal            Medical Decision Makin  Acute rash: oral corticosteroids    No results found for this or any previous visit (from the past 36 hour(s))  No orders to display         Portions of the record may have been created with voice recognition software  Occasional wrong word or "sound a like" substitutions may have occurred due to the inherent limitations of voice recognition software    Read the chart carefully and recognize, using context, where substitutions have occurred          Critical Care Time  Procedures

## 2022-04-25 ENCOUNTER — HOSPITAL ENCOUNTER (OUTPATIENT)
Facility: HOSPITAL | Age: 37
Setting detail: OBSERVATION
Discharge: HOME | End: 2022-04-27
Attending: EMERGENCY MEDICINE | Admitting: INTERNAL MEDICINE
Payer: COMMERCIAL

## 2022-04-25 ENCOUNTER — APPOINTMENT (EMERGENCY)
Dept: RADIOLOGY | Facility: HOSPITAL | Age: 37
End: 2022-04-25
Payer: COMMERCIAL

## 2022-04-25 DIAGNOSIS — R06.02 EXERTIONAL SHORTNESS OF BREATH: Primary | ICD-10-CM

## 2022-04-25 DIAGNOSIS — E66.2 OBESITY HYPOVENTILATION SYNDROME (HCC): ICD-10-CM

## 2022-04-25 DIAGNOSIS — G47.33 OSA (OBSTRUCTIVE SLEEP APNEA): ICD-10-CM

## 2022-04-25 DIAGNOSIS — G47.30 SEVERE SLEEP APNEA: ICD-10-CM

## 2022-04-25 LAB
ALBUMIN SERPL BCP-MCNC: 3.4 G/DL (ref 3.5–5)
ALP SERPL-CCNC: 73 U/L (ref 46–116)
ALT SERPL W P-5'-P-CCNC: 44 U/L (ref 12–78)
ANION GAP SERPL CALCULATED.3IONS-SCNC: 4 MMOL/L (ref 4–13)
AST SERPL W P-5'-P-CCNC: 26 U/L (ref 5–45)
ATRIAL RATE: 82 BPM
BASOPHILS # BLD AUTO: 0.03 THOUSANDS/ΜL (ref 0–0.1)
BASOPHILS NFR BLD AUTO: 0 % (ref 0–1)
BILIRUB SERPL-MCNC: 0.86 MG/DL (ref 0.2–1)
BUN SERPL-MCNC: 11 MG/DL (ref 5–25)
CALCIUM ALBUM COR SERPL-MCNC: 9.3 MG/DL (ref 8.3–10.1)
CALCIUM SERPL-MCNC: 8.8 MG/DL (ref 8.3–10.1)
CARDIAC TROPONIN I PNL SERPL HS: 3 NG/L
CHLORIDE SERPL-SCNC: 103 MMOL/L (ref 100–108)
CO2 SERPL-SCNC: 30 MMOL/L (ref 21–32)
CREAT SERPL-MCNC: 1.05 MG/DL (ref 0.6–1.3)
D DIMER PPP FEU-MCNC: 0.58 UG/ML FEU
EOSINOPHIL # BLD AUTO: 0.24 THOUSAND/ΜL (ref 0–0.61)
EOSINOPHIL NFR BLD AUTO: 3 % (ref 0–6)
ERYTHROCYTE [DISTWIDTH] IN BLOOD BY AUTOMATED COUNT: 14.7 % (ref 11.6–15.1)
GFR SERPL CREATININE-BSD FRML MDRD: 91 ML/MIN/1.73SQ M
GLUCOSE SERPL-MCNC: 76 MG/DL (ref 65–140)
HCT VFR BLD AUTO: 45.6 % (ref 36.5–49.3)
HGB BLD-MCNC: 13.7 G/DL (ref 12–17)
IMM GRANULOCYTES # BLD AUTO: 0.04 THOUSAND/UL (ref 0–0.2)
IMM GRANULOCYTES NFR BLD AUTO: 1 % (ref 0–2)
LYMPHOCYTES # BLD AUTO: 1.65 THOUSANDS/ΜL (ref 0.6–4.47)
LYMPHOCYTES NFR BLD AUTO: 19 % (ref 14–44)
MCH RBC QN AUTO: 25.9 PG (ref 26.8–34.3)
MCHC RBC AUTO-ENTMCNC: 30 G/DL (ref 31.4–37.4)
MCV RBC AUTO: 86 FL (ref 82–98)
MONOCYTES # BLD AUTO: 0.73 THOUSAND/ΜL (ref 0.17–1.22)
MONOCYTES NFR BLD AUTO: 9 % (ref 4–12)
NEUTROPHILS # BLD AUTO: 5.87 THOUSANDS/ΜL (ref 1.85–7.62)
NEUTS SEG NFR BLD AUTO: 68 % (ref 43–75)
NRBC BLD AUTO-RTO: 0 /100 WBCS
NT-PROBNP SERPL-MCNC: 39 PG/ML
P AXIS: 68 DEGREES
PLATELET # BLD AUTO: 174 THOUSANDS/UL (ref 149–390)
PMV BLD AUTO: 11 FL (ref 8.9–12.7)
POTASSIUM SERPL-SCNC: 4.1 MMOL/L (ref 3.5–5.3)
PR INTERVAL: 138 MS
PROT SERPL-MCNC: 7.6 G/DL (ref 6.4–8.2)
QRS AXIS: 76 DEGREES
QRSD INTERVAL: 82 MS
QT INTERVAL: 364 MS
QTC INTERVAL: 425 MS
RBC # BLD AUTO: 5.29 MILLION/UL (ref 3.88–5.62)
SODIUM SERPL-SCNC: 137 MMOL/L (ref 136–145)
T WAVE AXIS: 44 DEGREES
VENTRICULAR RATE: 82 BPM
WBC # BLD AUTO: 8.56 THOUSAND/UL (ref 4.31–10.16)

## 2022-04-25 PROCEDURE — 80053 COMPREHEN METABOLIC PANEL: CPT

## 2022-04-25 PROCEDURE — 71045 X-RAY EXAM CHEST 1 VIEW: CPT

## 2022-04-25 PROCEDURE — 84484 ASSAY OF TROPONIN QUANT: CPT

## 2022-04-25 PROCEDURE — 93005 ELECTROCARDIOGRAM TRACING: CPT

## 2022-04-25 PROCEDURE — 99285 EMERGENCY DEPT VISIT HI MDM: CPT | Performed by: EMERGENCY MEDICINE

## 2022-04-25 PROCEDURE — 85379 FIBRIN DEGRADATION QUANT: CPT

## 2022-04-25 PROCEDURE — 99285 EMERGENCY DEPT VISIT HI MDM: CPT

## 2022-04-25 PROCEDURE — 93010 ELECTROCARDIOGRAM REPORT: CPT | Performed by: INTERNAL MEDICINE

## 2022-04-25 PROCEDURE — 85025 COMPLETE CBC W/AUTO DIFF WBC: CPT

## 2022-04-25 PROCEDURE — 83880 ASSAY OF NATRIURETIC PEPTIDE: CPT

## 2022-04-25 PROCEDURE — 36415 COLL VENOUS BLD VENIPUNCTURE: CPT

## 2022-04-25 NOTE — LETTER
[address] [facility] 3  [address] [id number]  Dept: [phone]    April 27, 2022     Patient: Karina Rodriguez   YOB: 1985   Date of Visit: 4/25/2022       To Whom it May Concern:    Karina Rodriguez is under my professional care  He was seen in the hospital from 4/25/2022   to 04/27/22  He may return to work on 04/28/2022 without limitations  If you have any questions or concerns, please don't hesitate to call           Sincerely,          Aniket Pabon MD

## 2022-04-25 NOTE — ED PROVIDER NOTES
History  Chief Complaint   Patient presents with    Leg Swelling     having b/l leg swelling for two weeks  reports has sleep apnea and does not have a machine for at night, he thinks that's why his legs have been swelling  denies chest pain and sob      29 y o M w/ h/o JOSE previously on CPAP 'years ago' and asthma for which he uses inhaler PRN and daily claritin presents due to increasing SOB and bilateral ankle swelling x 2-3 weeks  He started noticing that his breathing was becoming more difficult during this time  His albuterol inhaler has intermittently helped, but does not seem to provide significant relief with ambulation  He has been waking up every 2-3 hours at night with chest tightness and difficult breathing  He also has been unable to walk up a flight of stairs without having to stop to catch his breath  He denies any other subjective symptoms such as fevers, chills, chest pain, abdominal pain, urinary or bowel symptoms  He denies history of blood clots or PE  Prior to Admission Medications   Prescriptions Last Dose Informant Patient Reported? Taking? Asmanex  MCG/ACT AERO Past Week at Unknown time  No Yes   Sig: Inhale 1 puff (200 mcg total) daily Rinse mouth after use     albuterol (PROVENTIL HFA,VENTOLIN HFA) 90 mcg/act inhaler 2022 at Unknown time  No Yes   Sig: Inhale 2 puffs every 6 (six) hours as needed for wheezing   naproxen (NAPROSYN) 500 mg tablet Not Taking at Unknown time  No No   Sig: Take 1 tablet (500 mg total) by mouth 2 (two) times a day with meals   Patient not taking: Reported on 2022    predniSONE 10 mg tablet Past Week at Unknown time  No Yes   Si mg po x 3 days, 50 mg po x 3 days, 40 mg po x 3 days, 30 mg po x 3 days, 20 mg po x 3 days, 10 mg po x 3 days   predniSONE 20 mg tablet   No No   Sig: Take 1 tablet (20 mg total) by mouth daily for 3 days      Facility-Administered Medications: None       Past Medical History:   Diagnosis Date    Asthma History reviewed  No pertinent surgical history  History reviewed  No pertinent family history  I have reviewed and agree with the history as documented  E-Cigarette/Vaping    E-Cigarette Use Never User      E-Cigarette/Vaping Substances     Social History     Tobacco Use    Smoking status: Current Every Day Smoker     Packs/day: 0 25     Types: Cigarettes    Smokeless tobacco: Never Used   Vaping Use    Vaping Use: Never used   Substance Use Topics    Alcohol use: Not Currently    Drug use: Not Currently        Review of Systems   All other systems reviewed and are negative  Physical Exam  ED Triage Vitals   Temperature Pulse Respirations Blood Pressure SpO2   04/25/22 1625 04/25/22 1625 04/25/22 1625 04/25/22 1625 04/25/22 1625   98 5 °F (36 9 °C) (!) 106 20 144/79 97 %      Temp Source Heart Rate Source Patient Position - Orthostatic VS BP Location FiO2 (%)   04/25/22 1625 04/25/22 1625 04/25/22 1625 04/25/22 1625 04/26/22 2034   Temporal Monitor Sitting Left arm 50      Pain Score       04/25/22 1625       No Pain             Orthostatic Vital Signs  Vitals:    04/26/22 0700 04/26/22 0804 04/26/22 1443 04/26/22 2248   BP: 141/63 130/66 118/56 99/55   Pulse: 84 88 92 98   Patient Position - Orthostatic VS:   Lying Lying       Physical Exam  Vitals and nursing note reviewed  Constitutional:       Appearance: He is well-developed  HENT:      Head: Normocephalic and atraumatic  Right Ear: External ear normal       Left Ear: External ear normal       Nose: Nose normal       Mouth/Throat:      Mouth: Mucous membranes are moist    Eyes:      Conjunctiva/sclera: Conjunctivae normal    Cardiovascular:      Rate and Rhythm: Normal rate and regular rhythm  Heart sounds: No murmur heard  Pulmonary:      Effort: Pulmonary effort is normal  No respiratory distress  Breath sounds: Normal breath sounds  Abdominal:      Palpations: Abdomen is soft  Tenderness:  There is no abdominal tenderness  Musculoskeletal:      Cervical back: Neck supple  Right lower leg: Edema present  Left lower leg: Edema present  Skin:     General: Skin is warm and dry  Neurological:      General: No focal deficit present  Mental Status: He is alert     Psychiatric:         Mood and Affect: Mood normal          ED Medications  Medications   albuterol (PROVENTIL HFA,VENTOLIN HFA) inhaler 2 puff (2 puffs Inhalation Given 4/26/22 0910)   fluticasone (FLOVENT HFA) 220 mcg/act inhaler 1 puff (1 puff Inhalation Given 4/26/22 1105)   senna (SENOKOT) tablet 8 6 mg (8 6 mg Oral Not Given 4/26/22 0911)   bisacodyl (DULCOLAX) rectal suppository 10 mg (has no administration in time range)   nicotine (NICODERM CQ) 14 mg/24hr TD 24 hr patch 1 patch (1 patch Transdermal Not Given 4/26/22 0911)   enoxaparin (LOVENOX) subcutaneous injection 40 mg (40 mg Subcutaneous Given 4/26/22 1958)   acetaminophen (TYLENOL) tablet 650 mg (650 mg Oral Given 4/26/22 1042)   albuterol (PROVENTIL HFA,VENTOLIN HFA) inhaler 2 puff (2 puffs Inhalation Refused 4/26/22 2231)   iohexol (OMNIPAQUE) 350 MG/ML injection (SINGLE-DOSE) 100 mL (100 mL Intravenous Given 4/26/22 0101)   furosemide (LASIX) injection 20 mg (20 mg Intravenous Given 4/26/22 0457)       Diagnostic Studies  Results Reviewed     Procedure Component Value Units Date/Time    Blood gas, venous [[id number]]  (Abnormal) Collected: 04/26/22 9297    Lab Status: Final result Specimen: Blood from Arm, Left Updated: 04/26/22 0646     pH, John 7 239     pCO2, John 80 3 mm Hg      pO2, John 104 2 mm Hg      HCO3, John 33 6 mmol/L      Base Excess, John 3 3 mmol/L      O2 Content, John 19 6 ml/dL      O2 HGB, VENOUS 95 6 %     COVID/FLU/RSV - 2 hour TAT [[id number]]  (Normal) Collected: 04/26/22 0346    Lab Status: Final result Specimen: Nares from Nose Updated: 04/26/22 0549     SARS-CoV-2 Negative     INFLUENZA A PCR Negative     INFLUENZA B PCR Negative     RSV PCR Negative Narrative:      FOR PEDIATRIC PATIENTS - copy/paste COVID Guidelines URL to browser: [email or web address]    SARS-CoV-2 assay is a Nucleic Acid Amplification assay intended for the  qualitative detection of nucleic acid from SARS-CoV-2 in nasopharyngeal  swabs  Results are for the presumptive identification of SARS-CoV-2 RNA  Positive results are indicative of infection with SARS-CoV-2, the virus  causing COVID-19, but do not rule out bacterial infection or co-infection  with other viruses  Laboratories within the [place] and its  territories are required to report all positive results to the appropriate  public health authorities  Negative results do not preclude SARS-CoV-2  infection and should not be used as the sole basis for treatment or other  patient management decisions  Negative results must be combined with  clinical observations, patient history, and epidemiological information  This test has not been FDA cleared or approved  This test has been authorized by FDA under an Emergency Use Authorization  (EUA)  This test is only authorized for the duration of time the  declaration that circumstances exist justifying the authorization of the  emergency use of an in vitro diagnostic tests for detection of SARS-CoV-2  virus and/or diagnosis of COVID-19 infection under section 564(b)(1) of  the Act, 21 U  S C  957IJU-4(I)(6), unless the authorization is terminated  or revoked sooner  The test has been validated but independent review by FDA  and CLIA is pending  Test performed using [employer]: This RT-PCR assay targets N2,  a region unique to SARS-CoV-2  A conserved region in the E-gene was chosen  for pan-Sarbecovirus detection which includes SARS-CoV-2      Lipid panel [[id number]]  (Abnormal) Collected: 04/26/22 2317    Lab Status: Final result Specimen: Blood from Arm, Left Updated: 04/26/22 4978     Cholesterol 160 mg/dL Triglycerides 141 mg/dL      HDL, Direct 39 mg/dL      LDL Calculated 93 mg/dL      Non-HDL-Chol (CHOL-HDL) 121 mg/dl     CBC and differential [[id number]]  (Abnormal) Collected: 04/26/22 0457    Lab Status: Final result Specimen: Blood from Arm, Left Updated: 04/26/22 0509     WBC 7 63 Thousand/uL      RBC 5 06 Million/uL      Hemoglobin 13 2 g/dL      Hematocrit 44 0 %      MCV 87 fL      MCH 26 1 pg      MCHC 30 0 g/dL      RDW 14 7 %      MPV 11 0 fL      Platelets 447 Thousands/uL      nRBC 0 /100 WBCs      Neutrophils Relative 68 %      Immat GRANS % 1 %      Lymphocytes Relative 19 %      Monocytes Relative 8 %      Eosinophils Relative 3 %      Basophils Relative 1 %      Neutrophils Absolute 5 29 Thousands/µL      Immature Grans Absolute 0 04 Thousand/uL      Lymphocytes Absolute 1 42 Thousands/µL      Monocytes Absolute 0 61 Thousand/µL      Eosinophils Absolute 0 23 Thousand/µL      Basophils Absolute 0 04 Thousands/µL     D-dimer, quantitative [[id number]]  (Abnormal) Collected: 04/25/22 2101    Lab Status: Final result Specimen: Blood from Arm, Left Updated: 04/25/22 2134     D-Dimer, Quant 0 58 ug/ml FEU     HS Troponin 0hr (reflex protocol) [[id number]]  (Normal) Collected: 04/25/22 1920    Lab Status: Final result Specimen: Blood from Arm, Left Updated: 04/25/22 1954     hs TnI 0hr 3 ng/L     Comprehensive metabolic panel [[id number]]  (Abnormal) Collected: 04/25/22 1920    Lab Status: Final result Specimen: Blood from Arm, Left Updated: 04/25/22 1949     Sodium 137 mmol/L      Potassium 4 1 mmol/L      Chloride 103 mmol/L      CO2 30 mmol/L      ANION GAP 4 mmol/L      BUN 11 mg/dL      Creatinine 1 05 mg/dL      Glucose 76 mg/dL      Calcium 8 8 mg/dL      Corrected Calcium 9 3 mg/dL      AST 26 U/L      ALT 44 U/L      Alkaline Phosphatase 73 U/L      Total Protein 7 6 g/dL      Albumin 3 4 g/dL      Total Bilirubin 0 86 mg/dL      eGFR 91 ml/min/1 73sq m     Narrative:      National Kidney Disease Foundation guidelines for Chronic Kidney Disease (CKD):     Stage 1 with normal or high GFR (GFR > 90 mL/min/1 73 square meters)    Stage 2 Mild CKD (GFR = 60-89 mL/min/1 73 square meters)    Stage 3A Moderate CKD (GFR = 45-59 mL/min/1 73 square meters)    Stage 3B Moderate CKD (GFR = 30-44 mL/min/1 73 square meters)    Stage 4 Severe CKD (GFR = 15-29 mL/min/1 73 square meters)    Stage 5 End Stage CKD (GFR <15 mL/min/1 73 square meters)  Note: GFR calculation is accurate only with a steady state creatinine    NT-BNP PRO [[id number]]  (Normal) Collected: 04/25/22 1920    Lab Status: Final result Specimen: Blood from Arm, Left Updated: 04/25/22 1949     NT-proBNP 39 pg/mL     CBC and differential [[id number]]  (Abnormal) Collected: 04/25/22 1920    Lab Status: Final result Specimen: Blood from Arm, Left Updated: 04/25/22 1937     WBC 8 56 Thousand/uL      RBC 5 29 Million/uL      Hemoglobin 13 7 g/dL      Hematocrit 45 6 %      MCV 86 fL      MCH 25 9 pg      MCHC 30 0 g/dL      RDW 14 7 %      MPV 11 0 fL      Platelets 656 Thousands/uL      nRBC 0 /100 WBCs      Neutrophils Relative 68 %      Immat GRANS % 1 %      Lymphocytes Relative 19 %      Monocytes Relative 9 %      Eosinophils Relative 3 %      Basophils Relative 0 %      Neutrophils Absolute 5 87 Thousands/µL      Immature Grans Absolute 0 04 Thousand/uL      Lymphocytes Absolute 1 65 Thousands/µL      Monocytes Absolute 0 73 Thousand/µL      Eosinophils Absolute 0 24 Thousand/µL      Basophils Absolute 0 03 Thousands/µL                  CTA ED chest PE study   Final Result by Vangie Perez MD (04/26 1095)      Technically limited study  Please see discussion  No evidence of large central pulmonary embolism  Scattered areas of mild atelectasis  No focal consolidation  No pneumothorax                    Workstation performed: [id number]         XR chest 1 view portable   Final Result by Mason Gavin MD (04/26 9000)      No acute cardiopulmonary disease  Workstation performed: [id number]               Procedures  US Guided Peripheral IV    Date/Time: 4/25/2022 7:33 PM  Performed by: Kevin Pryor MD  Authorized by: Kevin Pryor MD     Patient location:  ED  Performed by:  Resident  Other Assisting Provider: No    Indications:     Indications: difficulty obtaining IV access      Image availability:  Not saved  Procedure details:     Patient evaluated for contraindications to access (i e  fistula, thrombosis, etc): Yes      Standard clean technique used for ultrasound access: Yes      Location:  Left forearm    Catheter size:  20 gauge    Number of attempts:  1    Successful placement: yes    Post-procedure details:     Post-procedure:  Dressing applied    Assessment: free fluid flow and no signs of infiltration      Post-procedure complications: none      Patient tolerance of procedure: Tolerated well, no immediate complications  ECG 12 Lead Documentation Only    Date/Time: 4/25/2022 8:40 PM  Performed by: Kevin Pryor MD  Authorized by: Kevin Pryor MD     ECG reviewed by me, the ED Provider: yes    Patient location:  ED  Previous ECG:     Previous ECG:  Unavailable  Interpretation:     Interpretation: normal    Rate:     ECG rate assessment: normal    Rhythm:     Rhythm: sinus rhythm    Ectopy:     Ectopy: none    QRS:     QRS axis:  Normal    QRS intervals:  Normal  Conduction:     Conduction: normal    ST segments:     ST segments:  Normal  T waves:     T waves: normal            ED Course  ED Course as of 04/27/22 0302   Mon Apr 25, 2022 2151 D-Dimer, Quant(!): 0 58  Discussed with patiient, will obtain CT-PE study  2152 Patient was sleeping in room with pulse ox 81%  Upon wakening it improved to 93   On ambulation, O2 dropped to 83% and improved with rest                              SBIRT 20yo+      Most Recent Value   SBIRT (22 yo +)    In order to provide better care to our patients, we are screening all of our patients for alcohol and drug use  Would it be okay to ask you these screening questions? No Filed at: 04/26/2022 0310          Wells' Criteria for PE      Most Recent Value   Wells' Criteria for PE    Clinical signs and symptoms of DVT 0 Filed at: 04/25/2022 2147   PE is primary diagnosis or equally likely 0 Filed at: 04/25/2022 2147   HR >100 1 5 Filed at: 04/25/2022 2147   Immobilization at least 3 days or Surgery in the previous 4 weeks 0 Filed at: 04/25/2022 2147   Previous, objectively diagnosed PE or DVT 0 Filed at: 04/25/2022 2147   Hemoptysis 0 Filed at: 04/25/2022 2147   Malignancy with treatment within 6 months or palliative 0 Filed at: 04/25/2022 2147   Laura Colon Criteria Total 1 5 Filed at: 04/25/2022 2147            MDM  Number of Diagnoses or Management Options  Exertional shortness of breath  Severe sleep apnea  Diagnosis management comments: 72-year-old male with 2 weeks of worsening respiratory status as well as bilateral lower extremity edema  Presentation concerning for potential cardiac or pulmonary etiology  Will obtain cardiac workup, BNP, chest x-ray, and re-evaluate  Workup generally unremarkable for cause of the symptoms  Patient was sleeping in room upon return to evaluated and found to be hypoxic into low 80s  Upon awakening, patient returned to 90s  On attempts of ambulatory pulse ox, patient became short of breath and desaturated into 80s  Will obtain D-dimer to evaluate for possible clot contribution to this presentation  D-dimer mildly elevated  Discussed CT PE study with patient was agreeable  Will obtain this study  Due to these significant desaturations while sleeping or ambulating, patient would benefit from further workup and management  Discussed case with Internal Medicine who agrees with plan and admitted patient  Patient agreeable with plan        Disposition  Final diagnoses:   Exertional shortness of breath   Severe sleep apnea     Time reflects when diagnosis was documented in both MDM as applicable and the Disposition within this note     Time User Action Codes Description Comment    4/26/2022  3:30 AM Valentin Eduardo Add [R06 02] Exertional shortness of breath     4/26/2022  3:31 AM Valentin Eduardo Add [G47 30] Severe sleep apnea     4/26/2022  5:35 PM Nery Todd Add [G47 33] JOSE (obstructive sleep apnea)       ED Disposition     ED Disposition Condition Date/Time Comment    Admit Stable Tue Apr 26, 2022  3:30 AM Case was discussed with Dr Roseann Gomez and the patient's admission status was agreed to be Admission Status: observation status to the service of Dr Rivas Early   Follow-up Information    None         Current Discharge Medication List      CONTINUE these medications which have NOT CHANGED    Details   albuterol (PROVENTIL HFA,VENTOLIN HFA) 90 mcg/act inhaler Inhale 2 puffs every 6 (six) hours as needed for wheezing  Qty: 6 7 g, Refills: 0    Associated Diagnoses: Paronychia of finger      Asmanex  MCG/ACT AERO Inhale 1 puff (200 mcg total) daily Rinse mouth after use  Qty: 13 g, Refills: 0    Associated Diagnoses: Cough      predniSONE 10 mg tablet 60 mg po x 3 days, 50 mg po x 3 days, 40 mg po x 3 days, 30 mg po x 3 days, 20 mg po x 3 days, 10 mg po x 3 days  Qty: 63 tablet, Refills: 0    Associated Diagnoses: Rash      naproxen (NAPROSYN) 500 mg tablet Take 1 tablet (500 mg total) by mouth 2 (two) times a day with meals  Qty: 30 tablet, Refills: 0    Associated Diagnoses: Acute left-sided low back pain without sciatica      predniSONE 20 mg tablet Take 1 tablet (20 mg total) by mouth daily for 3 days  Qty: 3 tablet, Refills: 0    Associated Diagnoses: Cough           No discharge procedures on file  PDMP Review     None           ED Provider  Attending physically available and evaluated Viv Azeb FLETCHER managed the patient along with the ED Attending      Electronically Signed by         Jose Lr MD  04/27/22 6412

## 2022-04-25 NOTE — ED ATTENDING ATTESTATION
4/25/2022  IColleen MD, saw and evaluated the patient  I have discussed the patient with the resident/non-physician practitioner and agree with the resident's/non-physician practitioner's findings, Plan of Care, and MDM as documented in the resident's/non-physician practitioner's note, except where noted  All available labs and Radiology studies were reviewed  I was present for key portions of any procedure(s) performed by the resident/non-physician practitioner and I was immediately available to provide assistance  At this point I agree with the current assessment done in the Emergency Department  I have conducted an independent evaluation of this patient a history and physical is as follows:    Asthma and JOSE  Dyspnea worsening for few weeks  Has not seen PCP  Not using OP CPAP  Has orthopnea  ALCIDES LE edema  PAZ only 1 flight at a time  Is gaining weight  No cardiac history    VS and nursing notes reviewed  General: Appears in NAD, awake, alert, mildly inc WOB  Well-nourished, well-developed  Appears stated age  Head: Normocephalic, atraumatic  Eyes: EOMI  Vision grossly normal  No subconjunctival hemorrhages or occular discharge noted  Symmetrical lids  ENT: Atraumatic external nose and ears  No stridor  Normal phonation  No drooling  Normal swallowing  Neck: No JVD  FROM  No goiter  CV: No pallor  Normal rate  Lungs: No tachypnea  No respiratory distress  MSK: Moving all extremities equally, ALCIDES peripheral edema to the midcalf  Skin: Dry, intact  No cyanosis  Neuro: Awake, alert, GCS15  CN II-XII grossly intact  Grossly normal gait  Psychiatric/Behavioral: Appropriate mood and affect  A/P: This is a 29 y o  male who presents to the ED for evaluation of leg swelling, dyspnea  Fluid overload vs complications of untreated JOSE  Labs, EKG, CXR, anticiapte admit      ED Course       Critical Care Time  Procedures

## 2022-04-26 ENCOUNTER — APPOINTMENT (EMERGENCY)
Dept: RADIOLOGY | Facility: HOSPITAL | Age: 37
End: 2022-04-26
Payer: COMMERCIAL

## 2022-04-26 PROBLEM — J45.909 ASTHMA: Status: ACTIVE | Noted: 2022-04-26

## 2022-04-26 PROBLEM — R06.02 SOB (SHORTNESS OF BREATH): Status: ACTIVE | Noted: 2022-04-26

## 2022-04-26 PROBLEM — G47.33 OSA (OBSTRUCTIVE SLEEP APNEA): Status: ACTIVE | Noted: 2022-04-26

## 2022-04-26 LAB
BASE EX.OXY STD BLDV CALC-SCNC: 95.6 % (ref 60–80)
BASE EXCESS BLDV CALC-SCNC: 3.3 MMOL/L
BASOPHILS # BLD AUTO: 0.04 THOUSANDS/ΜL (ref 0–0.1)
BASOPHILS NFR BLD AUTO: 1 % (ref 0–1)
CHOLEST SERPL-MCNC: 160 MG/DL
EOSINOPHIL # BLD AUTO: 0.23 THOUSAND/ΜL (ref 0–0.61)
EOSINOPHIL NFR BLD AUTO: 3 % (ref 0–6)
ERYTHROCYTE [DISTWIDTH] IN BLOOD BY AUTOMATED COUNT: 14.7 % (ref 11.6–15.1)
FLUAV RNA RESP QL NAA+PROBE: NEGATIVE
FLUBV RNA RESP QL NAA+PROBE: NEGATIVE
HCO3 BLDV-SCNC: 33.6 MMOL/L (ref 24–30)
HCT VFR BLD AUTO: 44 % (ref 36.5–49.3)
HDLC SERPL-MCNC: 39 MG/DL
HGB BLD-MCNC: 13.2 G/DL (ref 12–17)
IMM GRANULOCYTES # BLD AUTO: 0.04 THOUSAND/UL (ref 0–0.2)
IMM GRANULOCYTES NFR BLD AUTO: 1 % (ref 0–2)
LDLC SERPL CALC-MCNC: 93 MG/DL (ref 0–100)
LYMPHOCYTES # BLD AUTO: 1.42 THOUSANDS/ΜL (ref 0.6–4.47)
LYMPHOCYTES NFR BLD AUTO: 19 % (ref 14–44)
MCH RBC QN AUTO: 26.1 PG (ref 26.8–34.3)
MCHC RBC AUTO-ENTMCNC: 30 G/DL (ref 31.4–37.4)
MCV RBC AUTO: 87 FL (ref 82–98)
MONOCYTES # BLD AUTO: 0.61 THOUSAND/ΜL (ref 0.17–1.22)
MONOCYTES NFR BLD AUTO: 8 % (ref 4–12)
NEUTROPHILS # BLD AUTO: 5.29 THOUSANDS/ΜL (ref 1.85–7.62)
NEUTS SEG NFR BLD AUTO: 68 % (ref 43–75)
NONHDLC SERPL-MCNC: 121 MG/DL
NRBC BLD AUTO-RTO: 0 /100 WBCS
O2 CT BLDV-SCNC: 19.6 ML/DL
PCO2 BLDV: 80.3 MM HG (ref 42–50)
PH BLDV: 7.24 [PH] (ref 7.3–7.4)
PLATELET # BLD AUTO: 158 THOUSANDS/UL (ref 149–390)
PMV BLD AUTO: 11 FL (ref 8.9–12.7)
PO2 BLDV: 104.2 MM HG (ref 35–45)
RBC # BLD AUTO: 5.06 MILLION/UL (ref 3.88–5.62)
RSV RNA RESP QL NAA+PROBE: NEGATIVE
SARS-COV-2 RNA RESP QL NAA+PROBE: NEGATIVE
TRIGL SERPL-MCNC: 141 MG/DL
WBC # BLD AUTO: 7.63 THOUSAND/UL (ref 4.31–10.16)

## 2022-04-26 PROCEDURE — G1004 CDSM NDSC: HCPCS

## 2022-04-26 PROCEDURE — 0241U HB NFCT DS VIR RESP RNA 4 TRGT: CPT | Performed by: EMERGENCY MEDICINE

## 2022-04-26 PROCEDURE — 94660 CPAP INITIATION&MGMT: CPT

## 2022-04-26 PROCEDURE — 94760 N-INVAS EAR/PLS OXIMETRY 1: CPT

## 2022-04-26 PROCEDURE — 85025 COMPLETE CBC W/AUTO DIFF WBC: CPT | Performed by: STUDENT IN AN ORGANIZED HEALTH CARE EDUCATION/TRAINING PROGRAM

## 2022-04-26 PROCEDURE — 71275 CT ANGIOGRAPHY CHEST: CPT

## 2022-04-26 PROCEDURE — 36415 COLL VENOUS BLD VENIPUNCTURE: CPT | Performed by: STUDENT IN AN ORGANIZED HEALTH CARE EDUCATION/TRAINING PROGRAM

## 2022-04-26 PROCEDURE — 99219 PR INITIAL OBSERVATION CARE/DAY 50 MINUTES: CPT | Performed by: INTERNAL MEDICINE

## 2022-04-26 PROCEDURE — 80061 LIPID PANEL: CPT | Performed by: STUDENT IN AN ORGANIZED HEALTH CARE EDUCATION/TRAINING PROGRAM

## 2022-04-26 PROCEDURE — 82805 BLOOD GASES W/O2 SATURATION: CPT | Performed by: STUDENT IN AN ORGANIZED HEALTH CARE EDUCATION/TRAINING PROGRAM

## 2022-04-26 RX ORDER — SENNOSIDES 8.6 MG
1 TABLET ORAL DAILY
Status: DISCONTINUED | OUTPATIENT
Start: 2022-04-26 | End: 2022-04-27 | Stop reason: HOSPADM

## 2022-04-26 RX ORDER — NICOTINE 21 MG/24HR
1 PATCH, TRANSDERMAL 24 HOURS TRANSDERMAL DAILY
Status: DISCONTINUED | OUTPATIENT
Start: 2022-04-26 | End: 2022-04-27 | Stop reason: HOSPADM

## 2022-04-26 RX ORDER — ALBUTEROL SULFATE 90 UG/1
2 AEROSOL, METERED RESPIRATORY (INHALATION) 4 TIMES DAILY
Status: DISCONTINUED | OUTPATIENT
Start: 2022-04-26 | End: 2022-04-27 | Stop reason: HOSPADM

## 2022-04-26 RX ORDER — ALBUTEROL SULFATE 90 UG/1
2 AEROSOL, METERED RESPIRATORY (INHALATION) EVERY 6 HOURS PRN
Status: DISCONTINUED | OUTPATIENT
Start: 2022-04-26 | End: 2022-04-27 | Stop reason: HOSPADM

## 2022-04-26 RX ORDER — FUROSEMIDE 10 MG/ML
20 INJECTION INTRAMUSCULAR; INTRAVENOUS ONCE
Status: COMPLETED | OUTPATIENT
Start: 2022-04-26 | End: 2022-04-26

## 2022-04-26 RX ORDER — FLUTICASONE PROPIONATE 220 UG/1
1 AEROSOL, METERED RESPIRATORY (INHALATION) DAILY
Status: DISCONTINUED | OUTPATIENT
Start: 2022-04-26 | End: 2022-04-27 | Stop reason: HOSPADM

## 2022-04-26 RX ORDER — BISACODYL 10 MG
10 SUPPOSITORY, RECTAL RECTAL DAILY PRN
Status: DISCONTINUED | OUTPATIENT
Start: 2022-04-26 | End: 2022-04-27 | Stop reason: HOSPADM

## 2022-04-26 RX ORDER — ACETAMINOPHEN 325 MG/1
650 TABLET ORAL EVERY 4 HOURS PRN
Status: DISCONTINUED | OUTPATIENT
Start: 2022-04-26 | End: 2022-04-27 | Stop reason: HOSPADM

## 2022-04-26 RX ADMIN — ENOXAPARIN SODIUM 40 MG: 40 INJECTION SUBCUTANEOUS at 19:58

## 2022-04-26 RX ADMIN — FUROSEMIDE 20 MG: 10 INJECTION, SOLUTION INTRAMUSCULAR; INTRAVENOUS at 04:57

## 2022-04-26 RX ADMIN — ALBUTEROL SULFATE 2 PUFF: 90 AEROSOL, METERED RESPIRATORY (INHALATION) at 09:10

## 2022-04-26 RX ADMIN — IOHEXOL 100 ML: 350 INJECTION, SOLUTION INTRAVENOUS at 01:01

## 2022-04-26 RX ADMIN — ALBUTEROL SULFATE 2.5 MG: 2.5 SOLUTION RESPIRATORY (INHALATION) at 13:54

## 2022-04-26 RX ADMIN — FLUTICASONE PROPIONATE 1 PUFF: 220 AEROSOL, METERED RESPIRATORY (INHALATION) at 11:05

## 2022-04-26 RX ADMIN — ALBUTEROL SULFATE 2 PUFF: 90 AEROSOL, METERED RESPIRATORY (INHALATION) at 19:27

## 2022-04-26 RX ADMIN — ACETAMINOPHEN 650 MG: 325 TABLET ORAL at 10:42

## 2022-04-26 NOTE — ASSESSMENT & PLAN NOTE
· Diagnosis sleep apnea on CPAP therapy 3 4 years ago  Worsening of symptoms with increased morning headaches, daytime sleepiness, SOB, LE swelling, fatigue  · See plan as above

## 2022-04-26 NOTE — RESPIRATORY THERAPY NOTE
RT Protocol Note  [facility] 29 y o  male MRN: [id number]  Unit/Bed#: ED 15 Encounter: [id number]    Assessment    Principal Problem:    SOB (shortness of breath)  Active Problems:    Asthma    JOSE (obstructive sleep apnea)      Home Pulmonary Medications:  Albuterol mdi prn       Past Medical History:   Diagnosis Date    Asthma      Social History     Socioeconomic History    Marital status: Single     Spouse name: None    Number of children: None    Years of education: None    Highest education level: None   Occupational History    None   Tobacco Use    Smoking status: Current Every Day Smoker     Packs/day: 0 25     Types: Cigarettes    Smokeless tobacco: Never Used   Vaping Use    Vaping Use: Never used   Substance and Sexual Activity    Alcohol use: No    Drug use: No    Sexual activity: None   Other Topics Concern    None   Social History Narrative    None     Social Determinants of Health     Financial Resource Strain: Not on file   Food Insecurity: Not on file   Transportation Needs: Not on file   Physical Activity: Not on file   Stress: Not on file   Social Connections: Not on file   Intimate Partner Violence: Not on file   Housing Stability: Not on file       Subjective         Objective    Physical Exam:   Assessment Type: (P) Assess only  General Appearance: (P) Drowsy  Respiratory Pattern: (P) Normal  Chest Assessment: (P) Chest expansion symmetrical  Bilateral Breath Sounds: (P) Clear,Diminished    Vitals:  Blood pressure 113/56, pulse 94, temperature 98 5 °F (36 9 °C), temperature source Temporal, resp  rate 20, SpO2 (P) 96 %  Imaging and other studies: I have personally reviewed pertinent reports  Plan    Respiratory Plan: (P) Home Bronchodilator Patient pathway,Discontinue Protocol        Resp Comments: (P) Pt evaluated per respiratoty protocol  Pt currently being ruled out for covid19, Pt has a history of asthma  Uses albuterol mdi prn   Will continue pt on prn mdi per covid protocol, No distress noted at this time

## 2022-04-26 NOTE — ASSESSMENT & PLAN NOTE
No previous PFT on file  Not in asthma exacerbation  No wheezing on examination  Respiratory failure likely from JOSE treated with CPAP therapy  Continue albuterol inhaler p r n   With Asmanex inhaler  Follow-up with PCP outpatient  Will need pulmonary follow-up outpatient

## 2022-04-26 NOTE — H&P
INTERNAL MEDICINE RESIDENCY ADMISSION H&P     Name: Pastor Iverson   Age & Sex: 29 y o  male   MRN: [id number]  Unit/Bed#: ED 15   Encounter: [id number]  Primary Care Provider: No primary care provider on file  Code Status: No Order  Admission Status: OBSERVATION  Disposition: Patient requires Med/Surg    Admit to team: SOD Team A    ASSESSMENT/PLAN     Principal Problem:    SOB (shortness of breath)  Active Problems:    Asthma    JOSE (obstructive sleep apnea)      * SOB (shortness of breath)  Assessment & Plan  Patient does have a history of asthma  Has been using his albuterol inhaler more frequently recently without relief  Is also on an ICS  Has noticed increased swelling in both of his lower extremities  On presentation, the patient did not have an elevated BNP  CTA was negative for PE, did show small granuloma in the posterior left lower lobe  Chest x-ray on wet read showed some mild vascular congestion  Patient was started on CPAP in the emergency department and improved markedly  Prior to CPAP, the patient was saturating in the 60s to 70s while napping  Has had increased daytime somnolence  COVID negative  His shortness of breath is likely multifactorial at this time  · No wheezing on exam, mild rales, some lower extremity edema, some mildly elevated JVP  · Patient doing much better on CPAP, will likely need overnight pulse ox for CPAP qualification (patient was previously on CPAP as an outpatient, but has not had his machine in 2-3 years)  · Consider pulmonology consult  · Will hold off on steroids at this time  · Patient is naive to diuretics, will trial a small dose of 20 mg IV Lasix  · Monitor urine output  · Check echocardiogram    JOSE (obstructive sleep apnea)  Assessment & Plan  Patient states that he previously had a CPAP machine, but has not used 1 in 2-3 years  Patient has had increased daytime somnolence and shortness of breath    · Continue CPAP QHS  · Consider overnight pulse ox tomorrow night to qualify him for CPAP    Asthma  Assessment & Plan  Patient currently on albuterol as needed and ICS (mometasone)  No PFTs on file  · Consider pulmonology consult  · Patient is not wheezing on exam, will hold off on steroids at this time  · Continue albuterol as needed, switch to flovent for ICS (on formulary)  · Source of shortness of breath is likely JOSE/OHS versus a component of heart failure  · Follow-up with pulmonology outpatient      VTE Pharmacologic Prophylaxis: Enoxaparin (Lovenox)  VTE Mechanical Prophylaxis: sequential compression device    CHIEF COMPLAINT     Chief Complaint   Patient presents with    Leg Swelling     having b/l leg swelling for two weeks  reports has sleep apnea and does not have a machine for at night, he thinks that's why his legs have been swelling  denies chest pain and sob  HISTORY OF PRESENT ILLNESS     Patient is a 46-year-old male with a past medical history of JOSE previous on CPAP (has not used it in years), tobacco abuse, and asthma that presents to the emergency department on 04/25/2022 because of increasing shortness of breath and lower extremity swelling for the past 2-3 weeks  He has had some relief with his albuterol inhaler, but still having dyspnea on exertion  Patient also states that he has been having some difficulty breathing and orthopnea at night  On presentation, patient's labs were significant for a D-dimer of 0 58  Patient's troponin and proBNP were negative  CTA was negative for for PE, small granuloma in the posterior left lower lobe  Chest x-ray on wet read showing some mild vascular congestion  Patient no reported history of heart failure; however, he was not very cooperative on exam   Was able to state that he has had increased daytime somnolence  The patient tested negative for COVID, was started on CPAP in the emergency department and felt much better  Patient was admitted further workup of shortness of breath  Confirmed level 1 full code  REVIEW OF SYSTEMS     Review of Systems   Constitutional: Negative for chills and fever  HENT: Negative for congestion, hearing loss, mouth sores, nosebleeds and postnasal drip  Eyes: Negative for discharge and itching  Respiratory: Positive for shortness of breath  Cardiovascular: Negative for chest pain  Gastrointestinal: Negative for abdominal distention and abdominal pain  Genitourinary: Negative for difficulty urinating and dysuria  Musculoskeletal: Negative for arthralgias and myalgias  Skin: Negative for rash  Neurological: Negative for dizziness and headaches  Hematological: Negative for adenopathy  Psychiatric/Behavioral: Negative for agitation and confusion  OBJECTIVE     Vitals:    22 1951 22 2239 22 0200 22 0400   BP:  126/58 130/64    BP Location:  Right arm Right arm    Pulse: 90 95 94    Resp: 21 20 20    Temp:       TempSrc:       SpO2: 91% 95% 94% 100%      Temperature:   Temp (24hrs), Av 5 °F (36 9 °C), Min:98 5 °F (36 9 °C), Max:98 5 °F (36 9 °C)    Temperature: 98 5 °F (36 9 °C)  Intake & Output:  I/O     None        Weights: There is no height or weight on file to calculate BMI  Weight (last 2 days)     None        Physical Exam  Vitals reviewed  HENT:      Head: Normocephalic and atraumatic  Right Ear: External ear normal       Left Ear: External ear normal       Nose: Nose normal       Mouth/Throat:      Mouth: Mucous membranes are moist       Pharynx: Oropharynx is clear  Eyes:      Extraocular Movements: Extraocular movements intact  Pupils: Pupils are equal, round, and reactive to light  Cardiovascular:      Rate and Rhythm: Normal rate  Pulses: Normal pulses  Heart sounds: Normal heart sounds  Pulmonary:      Comments: Decreased breath sounds B/L  Abdominal:      General: Bowel sounds are normal  There is no distension  Palpations: Abdomen is soft  Tenderness: There is no abdominal tenderness  Musculoskeletal:      Cervical back: Normal range of motion  Right lower leg: Edema present  Left lower leg: Edema present  Skin:     General: Skin is warm and dry  Capillary Refill: Capillary refill takes less than 2 seconds  Neurological:      General: No focal deficit present  Mental Status: He is oriented to person, place, and time  Psychiatric:         Mood and Affect: Mood normal          Behavior: Behavior normal        PAST MEDICAL HISTORY     Past Medical History:   Diagnosis Date    Asthma      PAST SURGICAL HISTORY   History reviewed  No pertinent surgical history  SOCIAL & FAMILY HISTORY     Social History     Substance and Sexual Activity   Alcohol Use No     Substance and Sexual Activity   Alcohol Use No        Substance and Sexual Activity   Drug Use No     Social History     Tobacco Use   Smoking Status Current Every Day Smoker    Packs/day: 0 25    Types: Cigarettes   Smokeless Tobacco Never Used     History reviewed  No pertinent family history  LABORATORY DATA     Labs: I have personally reviewed pertinent reports  Results from last 7 days   Lab Units 04/25/22  1920   WBC Thousand/uL 8 56   HEMOGLOBIN g/dL 13 7   HEMATOCRIT % 45 6   PLATELETS Thousands/uL 174   NEUTROS PCT % 68   MONOS PCT % 9      Results from last 7 days   Lab Units 04/25/22  1920   POTASSIUM mmol/L 4 1   CHLORIDE mmol/L 103   CO2 mmol/L 30   BUN mg/dL 11   CREATININE mg/dL 1 05   CALCIUM mg/dL 8 8   ALK PHOS U/L 73   ALT U/L 44   AST U/L 26                          Micro:  Lab Results   Component Value Date    BLOODCX No Growth After 5 Days  03/08/2018    BLOODCX No Growth After 5 Days  03/08/2018     IMAGING & DIAGNOSTIC TESTS     Imaging: I have personally reviewed pertinent reports  CTA ED chest PE study    Result Date: 4/26/2022  Impression: Technically limited study  Please see discussion  No evidence of large central pulmonary embolism  Scattered areas of mild atelectasis  No focal consolidation  No pneumothorax  Workstation performed: [id number]     EKG, Pathology, and Other Studies: I have personally reviewed pertinent reports  ALLERGIES     Allergies   Allergen Reactions    Pollen Extract Other (See Comments)     Hives     MEDICATIONS PRIOR TO ARRIVAL     Prior to Admission medications    Medication Sig Start Date End Date Taking? Authorizing Provider   albuterol (PROVENTIL HFA,VENTOLIN HFA) 90 mcg/act inhaler Inhale 2 puffs every 6 (six) hours as needed for wheezing 2/19/18   Juventino Garrido MD   Asmanex  MCG/ACT AERO Inhale 1 puff (200 mcg total) daily Rinse mouth after use  1/31/22   Marco Tejeda MD   naproxen (NAPROSYN) 500 mg tablet Take 1 tablet (500 mg total) by mouth 2 (two) times a day with meals 4/20/21   Heena Turner MD   predniSONE 10 mg tablet 60 mg po x 3 days, 50 mg po x 3 days, 40 mg po x 3 days, 30 mg po x 3 days, 20 mg po x 3 days, 10 mg po x 3 days 3/14/22   Geno Malhotra PA-C   predniSONE 20 mg tablet Take 1 tablet (20 mg total) by mouth daily for 3 days 1/31/22 2/3/22  Marco Tejeda MD     MEDICATIONS ADMINISTERED IN LAST 24 HOURS     Medication Administration - last 24 hours from 04/25/2022 0421 to 04/26/2022 0421       Date/Time Order Dose Route Action Action by     04/26/2022 0101 iohexol (OMNIPAQUE) 350 MG/ML injection (SINGLE-DOSE) 100 mL 100 mL Intravenous Given Misti Teran        CURRENT MEDICATIONS     No current facility-administered medications for this encounter  Admission Time  I spent 45 minutes admitting the patient  This involved direct patient contact where I performed a full history and physical, reviewing previous records, and reviewing laboratory and other diagnostic studies  Portions of the record may have been created with voice recognition software    Occasional wrong word or "sound a like" substitutions may have occurred due to the inherent limitations of voice recognition software    Read the chart carefully and recognize, using context, where substitutions have occurred     ==  Haja Curiel, [facility]  Internal Medicine Residency PGY-2

## 2022-04-26 NOTE — ASSESSMENT & PLAN NOTE
Likely due to underlying JOSE exacerbation  Presented with worsening SOB on exertion, orthopnea, B/L LE edema, morning headache, difficulty staying asleep at night, daytime sleepiness which has been becoming worse  H/o diagnosed sleep apnea on CPAP 40 years ago  Currently not taking as having no insurance  Not following up in the pulmonology or PCP  Initially found desaturating and 60 to 70s while sleeping with VBG showing CO2 retention  Initial requiring oxygen and CPAP therapy with symptomatic improvement  Currently saturating 95% on room air  Get echocardiogram follow-up results outpatient with your PCP to re-evaluate for pulmonary HTN  Compliance with CPAP was encouraged  Pt does not have insurance which makes him difficult for getting CPAP machine  Was advised to sleep on his side, weight reduction was encouraged  Smoking cessation education was done    Outpatient diagnostic sleep study for CPAP therapy  Pulmonary follow-up outpatient

## 2022-04-26 NOTE — PLAN OF CARE
Problem: PAIN - ADULT  Goal: Verbalizes/displays adequate comfort level or baseline comfort level  Description: Interventions:  - Encourage patient to monitor pain and request assistance  - Assess pain using appropriate pain scale  - Administer analgesics based on type and severity of pain and evaluate response  - Implement non-pharmacological measures as appropriate and evaluate response  - Consider cultural and social influences on pain and pain management  - Notify physician/advanced practitioner if interventions unsuccessful or patient reports new pain  Outcome: Progressing     Problem: INFECTION - ADULT  Goal: Absence or prevention of progression during hospitalization  Description: INTERVENTIONS:  - Assess and monitor for signs and symptoms of infection  - Monitor lab/diagnostic results  - Monitor all insertion sites, i e  indwelling lines, tubes, and drains  - Monitor endotracheal if appropriate and nasal secretions for changes in amount and color  - [place] appropriate cooling/warming therapies per order  - Administer medications as ordered  - Instruct and encourage patient and family to use good hand hygiene technique  - Identify and instruct in appropriate isolation precautions for identified infection/condition  Outcome: Progressing  Goal: Absence of fever/infection during neutropenic period  Description: INTERVENTIONS:  - Monitor WBC    Outcome: Progressing     Problem: DISCHARGE PLANNING  Goal: Discharge to home or other facility with appropriate resources  Description: INTERVENTIONS:  - Identify barriers to discharge w/patient and caregiver  - Arrange for needed discharge resources and transportation as appropriate  - Identify discharge learning needs (meds, wound care, etc )  - Arrange for interpretive services to assist at discharge as needed  - Refer to Case Management Department for coordinating discharge planning if the patient needs post-hospital services based on physician/advanced practitioner order or complex needs related to functional status, cognitive ability, or social support system  Outcome: Progressing     Problem: Knowledge Deficit  Goal: Patient/family/caregiver demonstrates understanding of disease process, treatment plan, medications, and discharge instructions  Description: Complete learning assessment and assess knowledge base    Interventions:  - Provide teaching at level of understanding  - Provide teaching via preferred learning methods  Outcome: Progressing

## 2022-04-27 ENCOUNTER — APPOINTMENT (OUTPATIENT)
Dept: NON INVASIVE DIAGNOSTICS | Facility: HOSPITAL | Age: 37
End: 2022-04-27
Payer: COMMERCIAL

## 2022-04-27 VITALS
OXYGEN SATURATION: 98 % | HEIGHT: 68 IN | WEIGHT: 267.64 LBS | HEART RATE: 95 BPM | BODY MASS INDEX: 40.56 KG/M2 | SYSTOLIC BLOOD PRESSURE: 112 MMHG | DIASTOLIC BLOOD PRESSURE: 70 MMHG | RESPIRATION RATE: 19 BRPM | TEMPERATURE: 97.7 F

## 2022-04-27 PROBLEM — J96.02 ACUTE RESPIRATORY FAILURE WITH HYPOXIA AND HYPERCAPNIA (HCC): Status: ACTIVE | Noted: 2022-04-26

## 2022-04-27 PROBLEM — J96.01 ACUTE RESPIRATORY FAILURE WITH HYPOXIA AND HYPERCAPNIA (HCC): Status: ACTIVE | Noted: 2022-04-26

## 2022-04-27 PROBLEM — J96.02 ACUTE RESPIRATORY FAILURE WITH HYPOXIA AND HYPERCAPNIA (HCC): Status: RESOLVED | Noted: 2022-04-26 | Resolved: 2022-04-27

## 2022-04-27 PROBLEM — J96.01 ACUTE RESPIRATORY FAILURE WITH HYPOXIA AND HYPERCAPNIA (HCC): Status: RESOLVED | Noted: 2022-04-26 | Resolved: 2022-04-27

## 2022-04-27 LAB
ALBUMIN SERPL BCP-MCNC: 3.1 G/DL (ref 3.5–5)
ALP SERPL-CCNC: 69 U/L (ref 46–116)
ALT SERPL W P-5'-P-CCNC: 39 U/L (ref 12–78)
ANION GAP SERPL CALCULATED.3IONS-SCNC: -1 MMOL/L (ref 4–13)
AST SERPL W P-5'-P-CCNC: 17 U/L (ref 5–45)
BILIRUB SERPL-MCNC: 0.43 MG/DL (ref 0.2–1)
BUN SERPL-MCNC: 11 MG/DL (ref 5–25)
CALCIUM ALBUM COR SERPL-MCNC: 9.6 MG/DL (ref 8.3–10.1)
CALCIUM SERPL-MCNC: 8.9 MG/DL (ref 8.3–10.1)
CHLORIDE SERPL-SCNC: 105 MMOL/L (ref 100–108)
CO2 SERPL-SCNC: 37 MMOL/L (ref 21–32)
CREAT SERPL-MCNC: 1.15 MG/DL (ref 0.6–1.3)
GFR SERPL CREATININE-BSD FRML MDRD: 81 ML/MIN/1.73SQ M
GLUCOSE SERPL-MCNC: 118 MG/DL (ref 65–140)
MAGNESIUM SERPL-MCNC: 2.3 MG/DL (ref 1.6–2.6)
POTASSIUM SERPL-SCNC: 4.4 MMOL/L (ref 3.5–5.3)
PROT SERPL-MCNC: 7.1 G/DL (ref 6.4–8.2)
SODIUM SERPL-SCNC: 141 MMOL/L (ref 136–145)
TSH SERPL DL<=0.05 MIU/L-ACNC: 1.18 UIU/ML (ref 0.45–4.5)

## 2022-04-27 PROCEDURE — 99220 PR INITIAL OBSERVATION CARE/DAY 70 MINUTES: CPT | Performed by: PHYSICIAN ASSISTANT

## 2022-04-27 PROCEDURE — 80053 COMPREHEN METABOLIC PANEL: CPT | Performed by: STUDENT IN AN ORGANIZED HEALTH CARE EDUCATION/TRAINING PROGRAM

## 2022-04-27 PROCEDURE — 84443 ASSAY THYROID STIM HORMONE: CPT | Performed by: STUDENT IN AN ORGANIZED HEALTH CARE EDUCATION/TRAINING PROGRAM

## 2022-04-27 PROCEDURE — 99217 PR OBSERVATION CARE DISCHARGE MANAGEMENT: CPT | Performed by: INTERNAL MEDICINE

## 2022-04-27 PROCEDURE — 94762 N-INVAS EAR/PLS OXIMTRY CONT: CPT

## 2022-04-27 PROCEDURE — 83735 ASSAY OF MAGNESIUM: CPT | Performed by: STUDENT IN AN ORGANIZED HEALTH CARE EDUCATION/TRAINING PROGRAM

## 2022-04-27 PROCEDURE — 94760 N-INVAS EAR/PLS OXIMETRY 1: CPT

## 2022-04-27 RX ADMIN — ALBUTEROL SULFATE 2 PUFF: 90 AEROSOL, METERED RESPIRATORY (INHALATION) at 12:29

## 2022-04-27 RX ADMIN — FLUTICASONE PROPIONATE 1 PUFF: 220 AEROSOL, METERED RESPIRATORY (INHALATION) at 08:39

## 2022-04-27 RX ADMIN — ENOXAPARIN SODIUM 40 MG: 40 INJECTION SUBCUTANEOUS at 08:39

## 2022-04-27 RX ADMIN — ALBUTEROL SULFATE 2 PUFF: 90 AEROSOL, METERED RESPIRATORY (INHALATION) at 08:39

## 2022-04-27 RX ADMIN — ALBUTEROL SULFATE 2 PUFF: 90 AEROSOL, METERED RESPIRATORY (INHALATION) at 07:21

## 2022-04-27 RX ADMIN — SENNOSIDES 8.6 MG: 8.6 TABLET, FILM COATED ORAL at 08:39

## 2022-04-27 NOTE — DISCHARGE INSTR - AVS FIRST PAGE
Your strongly advised to lose weight  Follow-up with Pulmonary outpatient and take it CPAP regularly      Avoid smoking at all cost

## 2022-04-27 NOTE — PROGRESS NOTES
Pt set up for overnight pulse oximetry by RT  RN explained purpose and method to pt  Instructed by RT Gabriele to call if pulse ox sustains alarms for five minutes  Very short time later, RT called, and pt placed back on CPAP  Will continue to monitor

## 2022-04-27 NOTE — PLAN OF CARE
Problem: PAIN - ADULT  Goal: Verbalizes/displays adequate comfort level or baseline comfort level  Description: Interventions:  - Encourage patient to monitor pain and request assistance  - Assess pain using appropriate pain scale  - Administer analgesics based on type and severity of pain and evaluate response  - Implement non-pharmacological measures as appropriate and evaluate response  - Consider cultural and social influences on pain and pain management  - Notify physician/advanced practitioner if interventions unsuccessful or patient reports new pain  4/27/2022 1247 by Edgard Urbano RN  Outcome: Adequate for Discharge  4/27/2022 0758 by Edgard Urbano RN  Outcome: Progressing     Problem: INFECTION - ADULT  Goal: Absence or prevention of progression during hospitalization  Description: INTERVENTIONS:  - Assess and monitor for signs and symptoms of infection  - Monitor lab/diagnostic results  - Monitor all insertion sites, i e  indwelling lines, tubes, and drains  - Monitor endotracheal if appropriate and nasal secretions for changes in amount and color  - [place] appropriate cooling/warming therapies per order  - Administer medications as ordered  - Instruct and encourage patient and family to use good hand hygiene technique  - Identify and instruct in appropriate isolation precautions for identified infection/condition  4/27/2022 1247 by Edgard Urbano RN  Outcome: Adequate for Discharge  4/27/2022 0758 by Edgard Urbano RN  Outcome: Progressing  Goal: Absence of fever/infection during neutropenic period  Description: INTERVENTIONS:  - Monitor WBC    4/27/2022 1247 by Edgard Urbano RN  Outcome: Adequate for Discharge  4/27/2022 0758 by Edgard Urbano RN  Outcome: Progressing     Problem: DISCHARGE PLANNING  Goal: Discharge to home or other facility with appropriate resources  Description: INTERVENTIONS:  - Identify barriers to discharge w/patient and caregiver  - Arrange for needed discharge resources and transportation as appropriate  - Identify discharge learning needs (meds, wound care, etc )  - Arrange for interpretive services to assist at discharge as needed  - Refer to Case Management Department for coordinating discharge planning if the patient needs post-hospital services based on physician/advanced practitioner order or complex needs related to functional status, cognitive ability, or social support system  4/27/2022 1247 by Sofi Chacko RN  Outcome: Adequate for Discharge  4/27/2022 0758 by Sofi Chacko RN  Outcome: Progressing     Problem: Knowledge Deficit  Goal: Patient/family/caregiver demonstrates understanding of disease process, treatment plan, medications, and discharge instructions  Description: Complete learning assessment and assess knowledge base    Interventions:  - Provide teaching at level of understanding  - Provide teaching via preferred learning methods  4/27/2022 1247 by Sofi Chacko RN  Outcome: Adequate for Discharge  4/27/2022 0758 by Sofi Chacko RN  Outcome: Progressing     Problem: Potential for Falls  Goal: Patient will remain free of falls  Description: INTERVENTIONS:  - Educate patient/family on patient safety including physical limitations  - Instruct patient to call for assistance with activity   - Consult OT/PT to assist with strengthening/mobility   - Keep Call bell within reach  - Keep bed low and locked with side rails adjusted as appropriate  - Keep care items and personal belongings within reach  - Initiate and maintain comfort rounds  - Make Fall Risk Sign visible to staff  - Offer Toileting every  Hours, in advance of need  - Initiate/Maintain alarm  - Obtain necessary fall risk management equipme  - Apply yellow socks and bracelet for high fall risk patients  - Consider moving patient to room near nurses station  Outcome: Adequate for Discharge

## 2022-04-27 NOTE — UTILIZATION REVIEW
Initial Clinical Review    Admission: Date/Time/Statement:   Admission Orders (From admission, onward)     Ordered        04/26/22 0603  Place in Observation  Once                      Orders Placed This Encounter   Procedures    Place in Observation     Standing Status:   Standing     Number of Occurrences:   1     Order Specific Question:   Level of Care     Answer:   Med Surg [16]     ED Arrival Information     Expected Arrival Acuity    - 4/25/2022 16:13 Urgent         Means of arrival Escorted by Service Admission type    Walk-In Self SOD-B Medicine Urgent         Arrival complaint    both legs swollen        Chief Complaint   Patient presents with    Leg Swelling     having b/l leg swelling for two weeks  reports has sleep apnea and does not have a machine for at night, he thinks that's why his legs have been swelling  denies chest pain and sob  Initial Presentation: 29 y o  male presented to the ED as a walk in with increasing sob and LE edema  PMH of JOSE previous on CPAP (has not used it in years), tobacco abuse, and asthma  Pt reports worsening SOB and LE edema x 2-3 weeks, relived with albuterol inhaler but dyspnea on exertion remained  Also reports sob and orthopnea at night  In ED, D-dimer of 0 58  CTA was negative for for PE, small granuloma in the posterior left lower lobe  Chest x-ray on wet read showing some mild vascular congestion  On exam, mild rales, lower extremity edema, some mildly elevated JVP present  IV Lasix 20 mg given, placed on CPAP     · Admit as observation level of care for SOB: Consider pulmonology consult  · Will hold off on steroids at this time  · Patient is naive to diuretics, will trial a small dose of 20 mg IV Lasix  · Monitor urine output  · Check echocardiogram        ED Triage Vitals   Temperature Pulse Respirations Blood Pressure SpO2   04/25/22 1625 04/25/22 1625 04/25/22 1625 04/25/22 1625 04/25/22 1625   98 5 °F (36 9 °C) (!) 106 20 144/79 97 %      Temp Source Heart Rate Source Patient Position - Orthostatic VS BP Location FiO2 (%)   04/25/22 1625 04/25/22 1625 04/25/22 1625 04/25/22 1625 04/26/22 2034   Temporal Monitor Sitting Left arm 50      Pain Score       04/25/22 1625       No Pain          Wt Readings from Last 1 Encounters:   04/27/22 121 kg (267 lb 10 2 oz)     Additional Vital Signs:   Date/Time Temp Pulse Resp BP MAP (mmHg) SpO2 FiO2 (%) Calculated FIO2 (%) - Nasal Cannula Nasal Cannula O2 Flow Rate (L/min) O2 Device O2 Interface Device Patient Position - Orthostatic VS   04/27/22 0739 97 7 °F (36 5 °C) 95 19 112/70 -- 98 % -- -- -- -- -- --   04/27/22 0320 -- -- -- -- -- 98 % -- -- -- -- Face mask --   04/26/22 2358 -- -- -- -- -- 98 % 50 -- -- CPAP Full face mask --   04/26/22 2248 98 2 °F (36 8 °C) 98 18 99/55 74 98 % -- -- -- CPAP -- Lying   04/26/22 2240 -- -- -- -- -- 88 % Abnormal  21 -- -- None (Room air) -- --   04/26/22 2034 -- -- -- -- -- 97 % 50 -- -- CPAP Full face mask --   04/26/22 1600 -- -- -- -- -- -- -- -- -- None (Room air) -- --   04/26/22 1443 97 9 °F (36 6 °C) 92 19 118/56 81 97 % -- -- -- None (Room air) -- Lying   04/26/22 0804 97 8 °F (36 6 °C) 88 20 130/66 -- 97 % -- -- -- -- -- --   04/26/22 0700 -- 84 20 141/63 91 100 % -- -- -- None (Room air) -- --   04/26/22 0600 -- 78 20 142/75 101 99 % -- -- -- CPAP -- Lying   04/26/22 0500 -- 86 19 110/55 74 100 % -- -- -- CPAP -- Lying   04/26/22 0443 -- -- -- -- -- 96 % -- -- -- -- -- --   04/26/22 0400 -- 94 20 113/56 80 100 % -- 32 3 L/min Nasal cannula Face mask Lying   04/26/22 0200 -- 94 20 130/64 91 94 % -- 32 3 L/min Nasal cannula -- Lying   04/25/22 2239 -- 95 20 126/58 -- 95 % -- -- -- None (Room air) -- Lying   04/25/22 1951 -- 90 21 -- -- 91 % -- -- -- -- -- --   04/25/22 1920 -- 85 20 139/86 108 99 % -- -- -- -- -- --       Pertinent Labs/Diagnostic Test Results:   CTA ED chest PE study   Final Result by Abel Camp MD (04/26 0207)      Technically limited study  Please see discussion  No evidence of large central pulmonary embolism  Scattered areas of mild atelectasis  No focal consolidation  No pneumothorax  Workstation performed: [id number]         XR chest 1 view portable   Final Result by Alie Estrella MD (04/26 0900)      No acute cardiopulmonary disease                    Workstation performed: [id number]           Results from last 7 days   Lab Units 04/26/22  0346   SARS-COV-2  Negative     Results from last 7 days   Lab Units 04/26/22  0457 04/25/22  1920   WBC Thousand/uL 7 63 8 56   HEMOGLOBIN g/dL 13 2 13 7   HEMATOCRIT % 44 0 45 6   PLATELETS Thousands/uL 158 174   NEUTROS ABS Thousands/µL 5 29 5 87         Results from last 7 days   Lab Units 04/27/22  0450 04/25/22  1920   SODIUM mmol/L 141 137   POTASSIUM mmol/L 4 4 4 1   CHLORIDE mmol/L 105 103   CO2 mmol/L 37* 30   ANION GAP mmol/L -1* 4   BUN mg/dL 11 11   CREATININE mg/dL 1 15 1 05   EGFR ml/min/1 73sq m 81 91   CALCIUM mg/dL 8 9 8 8   MAGNESIUM mg/dL 2 3  --      Results from last 7 days   Lab Units 04/27/22  0450 04/25/22  1920   AST U/L 17 26   ALT U/L 39 44   ALK PHOS U/L 69 73   TOTAL PROTEIN g/dL 7 1 7 6   ALBUMIN g/dL 3 1* 3 4*   TOTAL BILIRUBIN mg/dL 0 43 0 86         Results from last 7 days   Lab Units 04/27/22  0450 04/25/22  1920   GLUCOSE RANDOM mg/dL 118 76             No results found for: BETA-HYDROXYBUTYRATE       Results from last 7 days   Lab Units 04/26/22  0632   PH GERSON  7 239*   PCO2 GERSON mm Hg 80 3*   PO2 GERSON mm Hg 104 2*   HCO3 GERSNO mmol/L 33 6*   BASE EXC GERSON mmol/L 3 3   O2 CONTENT GERSON ml/dL 19 6   O2 HGB, VENOUS % 95 6*             Results from last 7 days   Lab Units 04/25/22  1920   HS TNI 0HR ng/L 3     Results from last 7 days   Lab Units 04/25/22  2101   D-DIMER QUANTITATIVE ug/ml FEU 0 58*         Results from last 7 days   Lab Units 04/27/22  0450   TSH 3RD GENERATON uIU/mL 1 180                     Results from last 7 days   Lab Units 04/25/22  1920   NT-PRO BNP pg/mL 39                                 Results from last 7 days   Lab Units 04/26/22  0346   INFLUENZA A PCR  Negative   INFLUENZA B PCR  Negative   RSV PCR  Negative                                           ED Treatment:   Medication Administration from 04/25/2022 1613 to 04/26/2022 0357       Date/Time Order Dose Route Action Action by Comments     04/26/2022 0101 iohexol (OMNIPAQUE) 350 MG/ML injection (SINGLE-DOSE) 100 mL 100 mL Intravenous Given Chelo Bob      04/26/2022 0457 furosemide (LASIX) injection 20 mg 20 mg Intravenous Given Areli Amin RN         Past Medical History:   Diagnosis Date    Asthma      Present on Admission:   SOB (shortness of breath)   Asthma      Admitting Diagnosis: Leg swelling [M79 89]  Exertional shortness of breath [R06 02]  Severe sleep apnea [G47 30]  Age/Sex: 29 y o  male  Admission Orders:  Scheduled Medications:  albuterol, 2 puff, Inhalation, 4x Daily  enoxaparin, 40 mg, Subcutaneous, BID  fluticasone, 1 puff, Inhalation, Daily  nicotine, 1 patch, Transdermal, Daily  senna, 1 tablet, Oral, Daily      Continuous IV Infusions:     PRN Meds:  acetaminophen, 650 mg, Oral, Q4H PRN  albuterol, 2 puff, Inhalation, Q6H PRN  bisacodyl, 10 mg, Rectal, Daily PRN        IP CONSULT TO CASE MANAGEMENT  IP CONSULT TO PULMONOLOGY    Network Utilization Review Department  ATTENTION: Please call with any questions or concerns to [phone] and carefully listen to the prompts so that you are directed to the right person  All voicemails are confidential   Roberta Joseph all requests for admission clinical reviews, approved or denied determinations and any other requests to dedicated fax number below belonging to the campus where the patient is receiving treatment   List of dedicated fax numbers for the Facilities:  [address] [place] [address] DENIALS (Administrative/Medical Necessity) [phone]   [address] [place] [address] (Maternity/NICU/Pediatrics) 261 [facility] 40 125 [facility]  [phone]   Cherise Garsia 50 [address] [place] [phone] [id number] [place] [address] [address] [phone]

## 2022-04-27 NOTE — NURSING NOTE
Discharge instructions taught and explained  Importance of follow up explained  Review of medications  Opportunity given for any questions  AAOx4  Verbalized understanding  No complaints of pain  IV removed with out any complications  All belongings gather  Pt departed off the floor with paperwork and belongings in hand

## 2022-04-27 NOTE — DISCHARGE SUMMARY
[address] [facility]  Discharge- Massimo Choudhury 1985, 29 y o  male MRN: [id number]  Unit/Bed#: [facility] 321-01 Encounter: [id number]  Primary Care Provider: No primary care provider on file  Date and time admitted to hospital: 4/25/2022  6:02 PM    * Acute respiratory failure with hypoxia and hypercapnia (HCC)  Assessment & Plan  Likely due to underlying JOSE exacerbation  Presented with worsening SOB on exertion, orthopnea, B/L LE edema, morning headache, difficulty staying asleep at night, daytime sleepiness which has been becoming worse  H/o diagnosed sleep apnea on CPAP 40 years ago  Currently not taking as having no insurance  Not following up in the pulmonology or PCP  Initially found desaturating and 60 to 70s while sleeping with VBG showing CO2 retention  Initial requiring oxygen and CPAP therapy with symptomatic improvement  Currently saturating 95% on room air  Get echocardiogram follow-up results outpatient with your PCP to re-evaluate for pulmonary HTN  Compliance with CPAP was encouraged  Pt does not have insurance which makes him difficult for getting CPAP machine  Was advised to sleep on his side, weight reduction was encouraged  Smoking cessation education was done  Outpatient diagnostic sleep study for CPAP therapy  Pulmonary follow-up outpatient    JOSE (obstructive sleep apnea)  Assessment & Plan  · Diagnosis sleep apnea on CPAP therapy 3 4 years ago  Worsening of symptoms with increased morning headaches, daytime sleepiness, SOB, LE swelling, fatigue  · See plan as above  Asthma  Assessment & Plan  No previous PFT on file  Not in asthma exacerbation  No wheezing on examination  Respiratory failure likely from JOSE treated with CPAP therapy  Continue albuterol inhaler p r n   With Asmanex inhaler  Follow-up with PCP outpatient  Will need pulmonary follow-up outpatient      Discharging Resident Physician: Samanta Salazar MD  Attending: Nelson Cogan Sabrina Solis MD  PCP: No primary care provider on file  Admission Date: 4/25/2022  Admission Date:   Admission Orders (From admission, onward)     Ordered        04/26/22 0603  Place in Observation  Once                      Discharge Date: 04/27/22    Disposition:     Home    Reason for Admission:  Acute hypoxic respiratory failure likely from JOSE exacerbation    Consultations During Hospital Stay:  · Pulmonology    Procedures Performed:     Orders Placed This Encounter   Procedures    ED ECG Documentation Only    POC Cardiac US       Diagnosis:    Medical Problems             Resolved Problems  Date Reviewed: 4/27/2022    None                Principal Problem:    Acute respiratory failure with hypoxia and hypercapnia (HCC)  Active Problems:    Asthma    JOSE (obstructive sleep apnea)      Significant Findings / Test Results:     · H/o sleep apnea  · Not taking CPAP anymore  · Worsening JOSE treated with CPAP therapy  · VBG showing CO2 retention    Incidental Findings:   · None     Test Results Pending at Discharge (will require follow up): · None     Outpatient Tests Requested:  · Diagnostic sleep study for CPAP therapy    Complications:  None    Hospital Course: Vinod Larsen is a 29 y o  male patient who originally presented to the hospital on 4/25/2022 due to worsening SOB, B/L LE edema  Does have H/o asthma and takes long-acting along with p r n  Inhaler regularly  Was not in asthma exacerbation  However incidentally when he was sleeping he was found be saturating in 60s to 70s in ED  Does have H/o JOSE diagnosed previously and was on CPAP therapy however has not been taking it since last 4 years  States also had presence of morning headache, fatigue, daytime sleepiness, snoring, difficulty staying asleep at night  Other labs including BNP, troponin, EKG were other unremarkable  CT chest did show mild vascular congestion however no thrombus    He CPAP therapy was started again in ED and Pt sleep apnea improved symptomatically significantly after CPAP therapy here  Initially Pt was requiring around 3-4 L of oxygen however has improved and is saturating well on room air on discharge  On discharge Pt was tolerating diet well  Communicating well  Symptoms improved  He was evaluated by pulmonology as Pt has not been seen by pulmonology since many years and has JOSE are difficult to get CPAP machine at home  He was evaluated by pulse oximetry overnight which showed presence of sleep apnea  Due to not having insurance Pt was advised to follow-up with pulmonology outpatient and get diagnostic sleep study outpatient  Was advised to continue nonpharmacological management for sleep apnea  Was strongly advised to lose weight  However pulmonology was able to get him BiPAP script due to his high CO2 on presentation due to high readmission risk  Condition at Discharge: stable     Discharge Day Visit / Exam:     Subjective:  Patient was seen and examined by me at bedside  Communicated clearly  No particular overnight event reported  Hemodynamically stable and afebrile  Patient feels better overall  Saturating well on room air  Shortness brother improved  Mental status back to baseline  Slept well overnight  Symptomatically much better  Vitals: Blood Pressure: 112/70 (04/27/22 0739)  Pulse: 95 (04/27/22 0739)  Temperature: 97 7 °F (36 5 °C) (04/27/22 0739)  Temp Source: Oral (04/26/22 2248)  Respirations: 19 (04/27/22 0739)  Height: 5' 8" (172 7 cm) (04/26/22 0805)  Weight - Scale: 121 kg (267 lb 10 2 oz) (04/27/22 0536)  SpO2: 98 % (04/27/22 0739)  Exam:   Physical Exam  Vitals reviewed  Constitutional:       General: He is not in acute distress  Appearance: He is well-developed  He is obese  HENT:      Head: Normocephalic and atraumatic  Eyes:      General: No scleral icterus  Right eye: No discharge  Left eye: No discharge     Cardiovascular:      Rate and Rhythm: Normal rate and regular rhythm  Pulses: Normal pulses  Heart sounds: Normal heart sounds  Pulmonary:      Effort: Pulmonary effort is normal  No respiratory distress  Breath sounds: Normal breath sounds  No wheezing or rales  Chest:      Chest wall: No tenderness  Abdominal:      General: Bowel sounds are normal  There is no distension  Palpations: Abdomen is soft  Tenderness: There is no abdominal tenderness  Musculoskeletal:         General: No swelling or tenderness  Normal range of motion  Cervical back: Normal range of motion  Right lower leg: Edema present  Left lower leg: Edema present  Skin:     General: Skin is warm  Coloration: Skin is not pale  Neurological:      General: No focal deficit present  Mental Status: He is alert and oriented to person, place, and time  Mental status is at baseline  Cranial Nerves: No cranial nerve deficit  Sensory: No sensory deficit  Motor: No weakness  Psychiatric:         Mood and Affect: Mood normal          Behavior: Behavior normal        Discussion with Family:  Spoke to significant other regarding discharge planning yesterday  She agreed with the plan  Discharge instructions/Information to patient and family:   See after visit summary for information provided to patient and family  Provisions for Follow-Up Care:  See after visit summary for information related to follow-up care and any pertinent home health orders  Planned Readmission:  None     Discharge Medications:  See after visit summary for reconciled discharge medications provided to patient and family  Discharge Statement :  I spent 25 minutes discharging the patient  This time was spent on the day of discharge  I had direct contact with the patient on the day of discharge  Additional documentation is required if more than 30 minutes were spent on discharge           ** Please Note: This note has been constructed using a voice recognition system **

## 2022-04-27 NOTE — ASSESSMENT & PLAN NOTE
Likely due to underlying JOSE exacerbation  Presented with worsening SOB on exertion, orthopnea, B/L LE edema, morning headache, difficulty staying asleep at night, daytime sleepiness which has been becoming worse  H/o diagnosed sleep apnea on CPAP 40 years ago  Currently not taking as having no insurance  Not following up in the pulmonology or PCP  Initially found desaturating and 60 to 70s while sleeping with VBG showing CO2 retention  Initial requiring oxygen and CPAP therapy with symptomatic improvement  Currently saturating 95% on room air  Was able to manage BiPAP script for him on discharge, CM following  Get echocardiogram follow-up results outpatient with your PCP to re-evaluate for pulmonary HTN  Compliance with CPAP was encouraged  Pt does not have insurance which makes him difficult for getting CPAP machine  Was advised to sleep on his side, weight reduction was encouraged  Smoking cessation education was done    Outpatient diagnostic sleep study for CPAP therapy  Pulmonary follow-up outpatient

## 2022-04-27 NOTE — CASE MANAGEMENT
Case Management Discharge Planning Note    Patient name Yusef Camarillo  Location [facility] 321/[facility] 823-61 MRN [id number]  : 1985 Date 2022       Current Admission Date: 2022  Current Admission Diagnosis:Asthma   Patient Active Problem List    Diagnosis Date Noted    Asthma 2022    JOSE (obstructive sleep apnea) 2022      LOS (days): 0  Geometric Mean LOS (GMLOS) (days):   Days to GMLOS:     OBJECTIVE:            Current admission status: Observation   Preferred Pharmacy:   [id number] - [address]  Phone: [phone] Fax: [phone]    Primary Care Provider: No primary care provider on file  Primary Insurance: GONZALO RICHARDSON PENDING  Secondary Insurance:     DISCHARGE DETAILS:        Additional Comments: CM consulted to assist in the order of BIPIP for pt    CM visited pt's room to discuss payment options, but pt was already [facility] and left the hospital

## 2022-04-27 NOTE — PLAN OF CARE
Problem: PAIN - ADULT  Goal: Verbalizes/displays adequate comfort level or baseline comfort level  Description: Interventions:  - Encourage patient to monitor pain and request assistance  - Assess pain using appropriate pain scale  - Administer analgesics based on type and severity of pain and evaluate response  - Implement non-pharmacological measures as appropriate and evaluate response  - Consider cultural and social influences on pain and pain management  - Notify physician/advanced practitioner if interventions unsuccessful or patient reports new pain  Outcome: Progressing     Problem: INFECTION - ADULT  Goal: Absence or prevention of progression during hospitalization  Description: INTERVENTIONS:  - Assess and monitor for signs and symptoms of infection  - Monitor lab/diagnostic results  - Monitor all insertion sites, i e  indwelling lines, tubes, and drains  - Monitor endotracheal if appropriate and nasal secretions for changes in amount and color  - [place] appropriate cooling/warming therapies per order  - Administer medications as ordered  - Instruct and encourage patient and family to use good hand hygiene technique  - Identify and instruct in appropriate isolation precautions for identified infection/condition  Outcome: Progressing  Goal: Absence of fever/infection during neutropenic period  Description: INTERVENTIONS:  - Monitor WBC    Outcome: Progressing     Problem: DISCHARGE PLANNING  Goal: Discharge to home or other facility with appropriate resources  Description: INTERVENTIONS:  - Identify barriers to discharge w/patient and caregiver  - Arrange for needed discharge resources and transportation as appropriate  - Identify discharge learning needs (meds, wound care, etc )  - Arrange for interpretive services to assist at discharge as needed  - Refer to Case Management Department for coordinating discharge planning if the patient needs post-hospital services based on physician/advanced practitioner order or complex needs related to functional status, cognitive ability, or social support system  Outcome: Progressing     Problem: Knowledge Deficit  Goal: Patient/family/caregiver demonstrates understanding of disease process, treatment plan, medications, and discharge instructions  Description: Complete learning assessment and assess knowledge base    Interventions:  - Provide teaching at level of understanding  - Provide teaching via preferred learning methods  Outcome: Progressing

## 2022-04-27 NOTE — CONSULTS
Consultation - Pulmonary Medicine  Elissa Melton 29 y o  male MRN: [id number]  Unit/Bed#: [facility] 321-01 Encounter: [id number]  Code Status: Level 1 - Full Code    Assessment/Plan:    Margarita Dominguez is a 29 y o  Past Medical History:   Diagnosis Date    Acute on chronic respiratory failure with hypercapnia (HCC)     Asthma     Morbid obesity ([address] )     Nocturnal hypoxemia        Nocturnal Hypoxemia  -HS POX w/ 1:09 min > 89%  Suspected JOSE / OAHS with Obesity Hypoventilation and Acute on Chronic Hypercapneic Respiratory Failure  -with morbid obesity and report of nocturnal events  -improved in hospital with 12 cm CPAP  -ordered OP PSG for in lab study  -discussed in interim will order BIPAP 20/12 in OP setting   -inbasket message to Bridgette Hyman for referral to [facility] sleep   ABG:    VBG:Results from last 7 days   Lab Units 04/26/22  0632   PH GERSON  7 239*   PCO2 GERSON mm Hg 80 3*   PO2 GERSON mm Hg 104 2*   HCO3 GERSON mmol/L 33 6*   BASE EXC GERSON mmol/L 3 3     Hx Asthma:  Controlled on Asmanex / PRN Albuterol  Most recent AECOPD Jan 2022  Morbid Obesity with BMI 40 6  -recommending weight loss to improve JOSE  -if refractory for consideration for OP referral to Bariatric weight management program       Thank you for this consultation; we will be happy to follow with you     ______________________________________________________________________      History of Present Illness   Physician Requesting Consult: Quentin Sun MD  Reason for Consult : JOSE      HPI:  Elissa Melton is a 29 y o  male  has a past medical history of Asthma  who presents with  a chief complaint of leg swelling and shortness of breath  28 y/o M w/ PMHX JOSE, morbid obesity, Asthma and prior CPAP usage long past presented for B/LE Edema and some shortness of breath and presented to [facility] and was admitted for LE edema  CXR was w/ some mild vascular congestion and LE swelling  Was given  Lasix in ED and placed on CPAP 12 CM    Admitted for hypoxemia as was demonstrated in ED and have overnight POX w/ marked 02 desaturations  Of > 1 hr < 89%  Pulmonary consulted for JOSE  Patient also did have ABG demonstrating acute on chronic hypercapneic respiratory failure  On my examination patient was non somnolent and feeling better and requesting to go home  Did also have echo ordered and to be completed in Op setting  Discussed with patient that in consideration of his morbid obesity he would qualify for BIPAP but ideally will need a sleep study in the OP setting  Discussed ordering OP BIPAP 20/12 w/ ramp  / desensitization to aid in preventing recurrent acute hypercapnea and rehospitalization  Pt states is agreeable to OP follow up  CTA pe study was performed and negative for PE and demonstrative of atelecatsis changes c/w hypoventilative state  Case d/w Dr Spencer Phillips w/ agreement for ordering BIPAP in OP setting w/ plan for OP PSG w/ referral to sleep specialist   See "future outpatient orders"  No prior PFT on file     Echo Pending     Review of Systems   Constitutional: Positive for fatigue  Negative for chills and fever  Marked daytime somnolence   HENT: Negative for ear pain and sore throat  Eyes: Negative for pain and visual disturbance  Respiratory: Negative for cough and shortness of breath  Cardiovascular: Negative for chest pain and palpitations  Gastrointestinal: Negative for abdominal pain and vomiting  Genitourinary: Negative for dysuria and hematuria  Musculoskeletal: Negative for arthralgias and back pain  Skin: Negative for color change and rash  Neurological: Negative for seizures and syncope  All other systems reviewed and are negative  Past Medical/Surgical History  Past Medical History:   Diagnosis Date    Asthma      History reviewed  No pertinent surgical history      Social History  Social History     Substance and Sexual Activity   Alcohol Use Not Currently     Social History     Substance and Sexual Activity Drug Use Not Currently     Social History     Tobacco Use   Smoking Status Current Every Day Smoker    Packs/day: 0 25    Types: Cigarettes   Smokeless Tobacco Never Used       Family History  History reviewed  No pertinent family history  Allergies  Allergies   Allergen Reactions    Pollen Extract Other (See Comments)     Hives       Home Meds:   Medications Prior to Admission   Medication Sig Dispense Refill Last Dose    albuterol (PROVENTIL HFA,VENTOLIN HFA) 90 mcg/act inhaler Inhale 2 puffs every 6 (six) hours as needed for wheezing 6 7 g 0 4/26/2022 at Unknown time    Asmanex  MCG/ACT AERO Inhale 1 puff (200 mcg total) daily Rinse mouth after use   13 g 0 Past Week at Unknown time    predniSONE 10 mg tablet 60 mg po x 3 days, 50 mg po x 3 days, 40 mg po x 3 days, 30 mg po x 3 days, 20 mg po x 3 days, 10 mg po x 3 days 63 tablet 0 Past Week at Unknown time    naproxen (NAPROSYN) 500 mg tablet Take 1 tablet (500 mg total) by mouth 2 (two) times a day with meals (Patient not taking: Reported on 4/26/2022 ) 30 tablet 0 Not Taking at Unknown time    predniSONE 20 mg tablet Take 1 tablet (20 mg total) by mouth daily for 3 days 3 tablet 0        Current Meds:   Scheduled Meds:  Current Facility-Administered Medications   Medication Dose Route Frequency Provider Last Rate    acetaminophen  650 mg Oral Q4H PRN Orange Alice, DO      albuterol  2 puff Inhalation Q6H PRN Florian Jenkins, DO      albuterol  2 puff Inhalation 4x Daily Tremaine Uriostegui MD      bisacodyl  10 mg Rectal Daily PRN Florian Jenkins, DO      enoxaparin  40 mg Subcutaneous BID Rekha Guzmán, DO      fluticasone  1 puff Inhalation Daily Florian Jenkins, DO      nicotine  1 patch Transdermal Daily Curtis Young, DO      senna  1 tablet Oral Daily Curtis Guzmán DO       PRN Meds:acetaminophen, 650 mg, Q4H PRN  albuterol, 2 puff, Q6H PRN  bisacodyl, 10 mg, Daily PRN      IV Infusions: ____________________________________________________________________    Objective   Vitals:   Vitals:    22 2358 22 0320 22 0536 22 0739   BP:    112/70   BP Location:       Pulse:    95   Resp:    19   Temp:    97 7 °F (36 5 °C)   TempSrc:       SpO2: 98% 98%  98%   Weight:   121 kg (267 lb 10 2 oz)    Height:         Weight (last 2 days)     Date/Time Weight    22 0536 121 (267 64)    22 0805 121 (266)        Temp (24hrs), Av 9 °F (36 6 °C), Min:97 7 °F (36 5 °C), Max:98 2 °F (36 8 °C)  Current: Temperature: 97 7 °F (36 5 °C)        Invasive/non-invasive ventilation settings   Respiratory  Report   Lab Data (Last 4 hours)    None         O2/Vent Data (Last 4 hours)    None                Nutrition:        Diet Orders   (From admission, onward)             Start     Ordered    22  Diet Cardiovascular; Sodium 2 GM  Diet effective now        References:    Nutrtion Support Algorithm Enteral vs  Parenteral   Question Answer Comment   Diet Type Cardiovascular    Cardiac Sodium 2 GM    RD to adjust diet per protocol? No        22 0619                Ins/Outs:   I/O        07 07 0701   0700  07 0700    P  O   444 340    Total Intake(mL/kg)  444 (3 7) 340 (2 8)    Net  +444 +340           Unmeasured Urine Occurrence 1 x 2 x           Lines/Drains:  Invasive Devices  Report    Peripheral Intravenous Line            Peripheral IV 22 Left;Ventral (anterior) Forearm 1 day              ___________________________________________________________________    Physical Exam  Constitutional:       General: He is not in acute distress  Appearance: He is well-developed  HENT:      Head: Normocephalic and atraumatic  Mouth/Throat:      Pharynx: No oropharyngeal exudate  Eyes:      Conjunctiva/sclera: Conjunctivae normal       Pupils: Pupils are equal, round, and reactive to light  Neck:      Thyroid: No thyromegaly  Vascular: No JVD  Trachea: No tracheal deviation  Cardiovascular:      Rate and Rhythm: Normal rate and regular rhythm  Heart sounds: Normal heart sounds  No murmur heard  No friction rub  No gallop  Pulmonary:      Effort: Pulmonary effort is normal  No respiratory distress  Breath sounds: Normal breath sounds  No stridor  No wheezing or rales  Chest:      Chest wall: No tenderness  Abdominal:      General: Bowel sounds are normal  There is no distension  Palpations: Abdomen is soft  Tenderness: There is no abdominal tenderness  There is no guarding  Musculoskeletal:         General: Normal range of motion  Cervical back: Normal range of motion and neck supple  Right lower leg: Swelling present  Left lower leg: Swelling present  Comments: BLE trace edema   Skin:     General: Skin is warm and dry  Findings: No erythema or rash  Neurological:      Mental Status: He is alert and oriented to person, place, and time         ___________________________________________________________________    Laboratory and Diagnostics:  Results from last 7 days   Lab Units 04/26/22  0457 04/25/22  1920   WBC Thousand/uL 7 63 8 56   HEMOGLOBIN g/dL 13 2 13 7   HEMATOCRIT % 44 0 45 6   PLATELETS Thousands/uL 158 174   NEUTROS PCT % 68 68   MONOS PCT % 8 9     Results from last 7 days   Lab Units 04/27/22  0450 04/25/22  1920   SODIUM mmol/L 141 137   POTASSIUM mmol/L 4 4 4 1   CHLORIDE mmol/L 105 103   CO2 mmol/L 37* 30   ANION GAP mmol/L -1* 4   BUN mg/dL 11 11   CREATININE mg/dL 1 15 1 05   CALCIUM mg/dL 8 9 8 8   GLUCOSE RANDOM mg/dL 118 76   ALT U/L 39 44   AST U/L 17 26   ALK PHOS U/L 69 73   ALBUMIN g/dL 3 1* 3 4*   TOTAL BILIRUBIN mg/dL 0 43 0 86     Results from last 7 days   Lab Units 04/27/22  0450   MAGNESIUM mg/dL 2 3                   ABG:    VBG:  Results from last 7 days   Lab Units 04/26/22  0632   PH GERSON  7 239*   PCO2 GERSON mm Hg 80 3*   PO2 GERSON mm Hg 104 2*   HCO3 GERSON mmol/L 33 6*   BASE EXC GERSON mmol/L 3 3           Micro        Imaging:   CTA ED chest PE study   Final Result by Abel Camp MD (04/26 8467)      Technically limited study  Please see discussion  No evidence of large central pulmonary embolism  Scattered areas of mild atelectasis  No focal consolidation  No pneumothorax  Workstation performed: [id number]         XR chest 1 view portable   Final Result by Doug Rangel MD (04/26 0900)      No acute cardiopulmonary disease  Workstation performed: [id number]               Micro:   Lab Results   Component Value Date    BLOODCX No Growth After 5 Days  03/08/2018    BLOODCX No Growth After 5 Days   03/08/2018        ____________________________________________________________________

## 2022-05-31 ENCOUNTER — HOSPITAL ENCOUNTER (OUTPATIENT)
Dept: SLEEP CENTER | Facility: CLINIC | Age: 37
Discharge: HOME | End: 2022-05-31
Payer: COMMERCIAL

## 2022-05-31 DIAGNOSIS — G47.33 OSA (OBSTRUCTIVE SLEEP APNEA): ICD-10-CM

## 2022-05-31 PROCEDURE — 95810 POLYSOM 6/> YRS 4/> PARAM: CPT

## 2022-05-31 PROCEDURE — 95810 POLYSOM 6/> YRS 4/> PARAM: CPT | Performed by: INTERNAL MEDICINE

## 2022-05-31 NOTE — PROGRESS NOTES
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 7AM: Alcohol use:NO    Typical day for patient:YES       Study Documentation    Sleep Study Indications: H&P states nocturnal choking, snoring, excessive daytime sleepiness, BMI>30, witnessed apneas, unrefreshing sleep, and a large neck circumference >18in  Asthma was also noted  Sleep Study: Diagnostic   Snore:Severe  Supplemental O2: no    O2 flow rate (L/min) range NA  O2 flow rate (L/min) final NA  Minimum SaO2 51 0%  Baseline SaO2 90 0%    EKG abnormalities: no     EEG abnormalities: no    Sleep Study Recorded < 2 hours: N/A    Sleep Study Recorded > 2 hours but incomplete study: N/A    Sleep Study Recorded 6 hours but no sleep obtained: NO    Patient classification: employed       Post-Sleep Study    Medication used at bedtime or during sleep study:NO    Patient reports time it took to fall asleep:greater than 60 minutes    Patient reports waking up during study:3 or more times  Patient reports returning to sleep in greater than 30 minutes  Patient reports sleeping less than 2 hours without dreaming  Patient reports sleep during study:typical    Patient rated sleepiness: Very sleepy or tired    PAP treatment:no

## 2022-06-02 ENCOUNTER — TELEPHONE (OUTPATIENT)
Dept: SLEEP CENTER | Facility: CLINIC | Age: 37
End: 2022-06-02

## 2022-06-02 DIAGNOSIS — G47.33 OSA (OBSTRUCTIVE SLEEP APNEA): Primary | ICD-10-CM

## 2022-06-02 DIAGNOSIS — R06.89 HYPOVENTILATION: ICD-10-CM

## 2022-06-02 NOTE — TELEPHONE ENCOUNTER
After speaking with Dr Jorgito Maurer CPAP titration study is needed ASAP   Patient is on schedule for September but on wait list for first cancellation

## 2022-06-02 NOTE — TELEPHONE ENCOUNTER
Cesar Mesa,    Dr Nori Rebollar has ordered a titration study for this patient to gain an accurate assessment of what he needs for PAP therapy  It is uncertain but doubtful that he has BIPAP as of yet

## 2022-06-02 NOTE — TELEPHONE ENCOUNTER
Called patient and advised sleep study resulted and shows severe JOSE( 6) and hypoxemia  CPAP study ordered and is already scheduled 9/30/22 in [facility]  Added to wait list      Diagnostic study was ordered by Josh SÁNCHEZ (pulmonary) while patient hospitalized on 4/27/22  Per study order, patient to follow up with Dr Chaparrita Silverio  Per chart, order for BiPAP placed 4/27/22 by Josh Cunha  Will need to clarify if patient to be set up on BiPAP while awaiting CPAP study? Patient does not currently have follow up appointment scheduled in the pulmonary office  Dr Chaparrita Silverio and Heidy Mckinnon, how would you like to proceed with this patient?

## 2022-06-06 NOTE — TELEPHONE ENCOUNTER
Spoke with patient on 6/3/22 and advised that we are not PAR with his insurance and advised him to call and switch his insurance to one that we are PAR with  Patient called and changed his insurance to [employer] which would not be effective until 7/1/22  Scheduled patient for next available appt of 10/12/22 and added patient to the cancellation list for any appts available starting 7/1/22  Explained all of this to the patient and he verbalized understanding

## 2022-06-06 NOTE — TELEPHONE ENCOUNTER
Patient's insurance is  [address] ([place]) not accepted by [facility] at this time

## 2022-06-12 ENCOUNTER — APPOINTMENT (INPATIENT)
Dept: NON INVASIVE DIAGNOSTICS | Facility: HOSPITAL | Age: 37
DRG: 133 | End: 2022-06-12
Payer: COMMERCIAL

## 2022-06-12 ENCOUNTER — APPOINTMENT (INPATIENT)
Dept: RADIOLOGY | Facility: HOSPITAL | Age: 37
DRG: 133 | End: 2022-06-12
Payer: COMMERCIAL

## 2022-06-12 ENCOUNTER — APPOINTMENT (EMERGENCY)
Dept: RADIOLOGY | Facility: HOSPITAL | Age: 37
DRG: 133 | End: 2022-06-12
Payer: COMMERCIAL

## 2022-06-12 ENCOUNTER — HOSPITAL ENCOUNTER (INPATIENT)
Facility: HOSPITAL | Age: 37
LOS: 3 days | Discharge: HOME | DRG: 133 | End: 2022-06-15
Attending: SURGERY | Admitting: SURGERY
Payer: COMMERCIAL

## 2022-06-12 DIAGNOSIS — J45.909 UNCOMPLICATED ASTHMA, UNSPECIFIED ASTHMA SEVERITY, UNSPECIFIED WHETHER PERSISTENT: ICD-10-CM

## 2022-06-12 DIAGNOSIS — G93.40 ACUTE ENCEPHALOPATHY: ICD-10-CM

## 2022-06-12 DIAGNOSIS — J96.02 ACUTE RESPIRATORY FAILURE WITH HYPOXIA AND HYPERCAPNIA (HCC): ICD-10-CM

## 2022-06-12 DIAGNOSIS — S06.2X0A: ICD-10-CM

## 2022-06-12 DIAGNOSIS — J96.01 ACUTE RESPIRATORY FAILURE WITH HYPOXIA AND HYPERCAPNIA (HCC): ICD-10-CM

## 2022-06-12 DIAGNOSIS — J45.909 ASTHMA, UNSPECIFIED ASTHMA SEVERITY, UNSPECIFIED WHETHER COMPLICATED, UNSPECIFIED WHETHER PERSISTENT: ICD-10-CM

## 2022-06-12 DIAGNOSIS — S02.2XXA CLOSED FRACTURE OF NASAL BONE, INITIAL ENCOUNTER: Primary | ICD-10-CM

## 2022-06-12 PROBLEM — E66.01 CLASS 3 SEVERE OBESITY DUE TO EXCESS CALORIES IN ADULT (HCC): Status: ACTIVE | Noted: 2022-06-12

## 2022-06-12 PROBLEM — E66.813 CLASS 3 SEVERE OBESITY DUE TO EXCESS CALORIES IN ADULT (HCC): Status: ACTIVE | Noted: 2022-06-12

## 2022-06-12 PROBLEM — R60.1 ANASARCA: Status: ACTIVE | Noted: 2022-06-12

## 2022-06-12 LAB
2HR DELTA HS TROPONIN: -1 NG/L
4HR DELTA HS TROPONIN: -2 NG/L
ABO GROUP BLD: NORMAL
ABO GROUP BLD: NORMAL
ALBUMIN SERPL BCP-MCNC: 3.3 G/DL (ref 3.5–5)
ALP SERPL-CCNC: 78 U/L (ref 46–116)
ALT SERPL W P-5'-P-CCNC: 41 U/L (ref 12–78)
AMMONIA PLAS-SCNC: 24 UMOL/L (ref 11–35)
AMPHETAMINES SERPL QL SCN: NEGATIVE
ANION GAP SERPL CALCULATED.3IONS-SCNC: -2 MMOL/L (ref 4–13)
ANION GAP SERPL CALCULATED.3IONS-SCNC: -3 MMOL/L (ref 4–13)
AORTIC ROOT: 3 CM
APICAL FOUR CHAMBER EJECTION FRACTION: 73 %
APTT PPP: 25 SECONDS (ref 23–37)
ARTERIAL PATENCY WRIST A: YES
AST SERPL W P-5'-P-CCNC: 72 U/L (ref 5–45)
BARBITURATES UR QL: NEGATIVE
BASE EXCESS BLDA CALC-SCNC: -2.5 MMOL/L
BASE EXCESS BLDA CALC-SCNC: 5.4 MMOL/L
BASE EXCESS BLDA CALC-SCNC: 7 MMOL/L
BASE EXCESS BLDA CALC-SCNC: 7 MMOL/L (ref -2–3)
BASE EXCESS BLDA CALC-SCNC: 7.7 MMOL/L
BASE EXCESS BLDA CALC-SCNC: 7.9 MMOL/L
BASOPHILS # BLD AUTO: 0.04 THOUSANDS/ΜL (ref 0–0.1)
BASOPHILS NFR BLD AUTO: 0 % (ref 0–1)
BENZODIAZ UR QL: NEGATIVE
BILIRUB SERPL-MCNC: 0.45 MG/DL (ref 0.2–1)
BLD GP AB SCN SERPL QL: NEGATIVE
BUN SERPL-MCNC: 18 MG/DL (ref 5–25)
BUN SERPL-MCNC: 21 MG/DL (ref 5–25)
CA-I BLD-SCNC: 1.16 MMOL/L (ref 1.12–1.32)
CALCIUM ALBUM COR SERPL-MCNC: 9.8 MG/DL (ref 8.3–10.1)
CALCIUM SERPL-MCNC: 8.9 MG/DL (ref 8.3–10.1)
CALCIUM SERPL-MCNC: 9.2 MG/DL (ref 8.3–10.1)
CARDIAC TROPONIN I PNL SERPL HS: 10 NG/L
CARDIAC TROPONIN I PNL SERPL HS: 11 NG/L
CARDIAC TROPONIN I PNL SERPL HS: 9 NG/L
CHLORIDE SERPL-SCNC: 105 MMOL/L (ref 100–108)
CHLORIDE SERPL-SCNC: 105 MMOL/L (ref 100–108)
CO2 SERPL-SCNC: 35 MMOL/L (ref 21–32)
CO2 SERPL-SCNC: 37 MMOL/L (ref 21–32)
COCAINE UR QL: NEGATIVE
CREAT SERPL-MCNC: 0.85 MG/DL (ref 0.6–1.3)
CREAT SERPL-MCNC: 1.19 MG/DL (ref 0.6–1.3)
D DIMER PPP FEU-MCNC: 2.48 UG/ML FEU
E WAVE DECELERATION TIME: 175 MS
EOSINOPHIL # BLD AUTO: 0.32 THOUSAND/ΜL (ref 0–0.61)
EOSINOPHIL NFR BLD AUTO: 3 % (ref 0–6)
ERYTHROCYTE [DISTWIDTH] IN BLOOD BY AUTOMATED COUNT: 15.9 % (ref 11.6–15.1)
FLUAV RNA RESP QL NAA+PROBE: NEGATIVE
FLUBV RNA RESP QL NAA+PROBE: NEGATIVE
FRACTIONAL SHORTENING: 35 (ref 28–44)
GFR SERPL CREATININE-BSD FRML MDRD: 112 ML/MIN/1.73SQ M
GFR SERPL CREATININE-BSD FRML MDRD: 78 ML/MIN/1.73SQ M
GLUCOSE SERPL-MCNC: 103 MG/DL (ref 65–140)
GLUCOSE SERPL-MCNC: 111 MG/DL (ref 65–140)
GLUCOSE SERPL-MCNC: 72 MG/DL (ref 65–140)
HCO3 BLDA-SCNC: 27.2 MMOL/L (ref 22–28)
HCO3 BLDA-SCNC: 33.5 MMOL/L (ref 22–28)
HCO3 BLDA-SCNC: 34.6 MMOL/L (ref 22–28)
HCO3 BLDA-SCNC: 34.7 MMOL/L (ref 22–28)
HCO3 BLDA-SCNC: 36.1 MMOL/L (ref 22–28)
HCO3 BLDA-SCNC: 36.9 MMOL/L (ref 24–30)
HCT VFR BLD AUTO: 44.2 % (ref 36.5–49.3)
HCT VFR BLD CALC: 42 % (ref 36.5–49.3)
HGB BLD-MCNC: 12.6 G/DL (ref 12–17)
HGB BLDA-MCNC: 14.3 G/DL (ref 12–17)
HOLD SPECIMEN: NORMAL
IMM GRANULOCYTES # BLD AUTO: 0.04 THOUSAND/UL (ref 0–0.2)
IMM GRANULOCYTES NFR BLD AUTO: 0 % (ref 0–2)
INR PPP: 0.93 (ref 0.84–1.19)
INTERVENTRICULAR SEPTUM IN DIASTOLE (PARASTERNAL SHORT AXIS VIEW): 1.1 CM
INTERVENTRICULAR SEPTUM: 1.1 CM (ref 0.6–1.1)
IPAP: 18
LACTATE SERPL-SCNC: 1.2 MMOL/L (ref 0.5–2)
LEFT ATRIUM SIZE: 3.7 CM
LEFT INTERNAL DIMENSION IN SYSTOLE: 3 CM (ref 2.1–4)
LEFT VENTRICULAR INTERNAL DIMENSION IN DIASTOLE: 4.6 CM (ref 3.5–6)
LEFT VENTRICULAR POSTERIOR WALL IN END DIASTOLE: 1.1 CM
LEFT VENTRICULAR STROKE VOLUME: 66 ML
LVSV (TEICH): 66 ML
LYMPHOCYTES # BLD AUTO: 1.53 THOUSANDS/ΜL (ref 0.6–4.47)
LYMPHOCYTES NFR BLD AUTO: 15 % (ref 14–44)
MCH RBC QN AUTO: 24.4 PG (ref 26.8–34.3)
MCHC RBC AUTO-ENTMCNC: 28.5 G/DL (ref 31.4–37.4)
MCV RBC AUTO: 86 FL (ref 82–98)
METHADONE UR QL: NEGATIVE
MONOCYTES # BLD AUTO: 0.91 THOUSAND/ΜL (ref 0.17–1.22)
MONOCYTES NFR BLD AUTO: 9 % (ref 4–12)
MV PEAK A VEL: 0.68 M/S
MV PEAK E VEL: 102 CM/S
MV STENOSIS PRESSURE HALF TIME: 51 MS
MV VALVE AREA P 1/2 METHOD: 4.31
NEUTROPHILS # BLD AUTO: 7.49 THOUSANDS/ΜL (ref 1.85–7.62)
NEUTS SEG NFR BLD AUTO: 73 % (ref 43–75)
NON VENT- BIPAP: ABNORMAL
NRBC BLD AUTO-RTO: 0 /100 WBCS
NT-PROBNP SERPL-MCNC: 61 PG/ML
O2 CT BLDA-SCNC: 15.6 ML/DL (ref 16–23)
O2 CT BLDA-SCNC: 16 ML/DL (ref 16–23)
O2 CT BLDA-SCNC: 16.4 ML/DL (ref 16–23)
O2 CT BLDA-SCNC: 17.2 ML/DL (ref 16–23)
O2 CT BLDA-SCNC: 17.6 ML/DL (ref 16–23)
OPIATES UR QL SCN: NEGATIVE
OXYCODONE+OXYMORPHONE UR QL SCN: NEGATIVE
OXYHGB MFR BLDA: 92.2 % (ref 94–97)
OXYHGB MFR BLDA: 92.5 % (ref 94–97)
OXYHGB MFR BLDA: 92.9 % (ref 94–97)
OXYHGB MFR BLDA: 93.4 % (ref 94–97)
OXYHGB MFR BLDA: 95.1 % (ref 94–97)
PCO2 BLD: 39 MMOL/L (ref 21–32)
PCO2 BLD: 77.2 MM HG (ref 42–50)
PCO2 BLDA: 59.3 MM HG (ref 36–44)
PCO2 BLDA: 60.3 MM HG (ref 36–44)
PCO2 BLDA: 67.5 MM HG (ref 36–44)
PCO2 BLDA: 72.1 MM HG (ref 36–44)
PCO2 BLDA: 75.9 MM HG (ref 36–44)
PCP UR QL: NEGATIVE
PEEP MAX SETTING VENT: 10 CM[H2O]
PH BLD: 7.29 [PH] (ref 7.3–7.4)
PH BLDA: 7.2 [PH] (ref 7.35–7.45)
PH BLDA: 7.29 [PH] (ref 7.35–7.45)
PH BLDA: 7.31 [PH] (ref 7.35–7.45)
PH BLDA: 7.38 [PH] (ref 7.35–7.45)
PH BLDA: 7.38 [PH] (ref 7.35–7.45)
PLATELET # BLD AUTO: 195 THOUSANDS/UL (ref 149–390)
PMV BLD AUTO: 11.8 FL (ref 8.9–12.7)
PO2 BLD: 44 MM HG (ref 35–45)
PO2 BLDA: 65.8 MM HG (ref 75–129)
PO2 BLDA: 72.9 MM HG (ref 75–129)
PO2 BLDA: 75.8 MM HG (ref 75–129)
PO2 BLDA: 85 MM HG (ref 75–129)
PO2 BLDA: 89.7 MM HG (ref 75–129)
POTASSIUM BLD-SCNC: 6.5 MMOL/L (ref 3.5–5.3)
POTASSIUM SERPL-SCNC: 4.5 MMOL/L (ref 3.5–5.3)
POTASSIUM SERPL-SCNC: 5.9 MMOL/L (ref 3.5–5.3)
PROT SERPL-MCNC: 8.2 G/DL (ref 6.4–8.2)
PROTHROMBIN TIME: 12.2 SECONDS (ref 11.6–14.5)
RBC # BLD AUTO: 5.16 MILLION/UL (ref 3.88–5.62)
RH BLD: NEGATIVE
RH BLD: NEGATIVE
RSV RNA RESP QL NAA+PROBE: NEGATIVE
SAO2 % BLD FROM PO2: 72 % (ref 60–85)
SARS-COV-2 RNA RESP QL NAA+PROBE: NEGATIVE
SL CV LV EF: 70
SL CV PED ECHO LEFT VENTRICLE DIASTOLIC VOLUME (MOD BIPLANE) 2D: 100 ML
SL CV PED ECHO LEFT VENTRICLE SYSTOLIC VOLUME (MOD BIPLANE) 2D: 34 ML
SODIUM BLD-SCNC: 139 MMOL/L (ref 136–145)
SODIUM SERPL-SCNC: 138 MMOL/L (ref 136–145)
SODIUM SERPL-SCNC: 139 MMOL/L (ref 136–145)
SPECIMEN EXPIRATION DATE: NORMAL
SPECIMEN SOURCE: ABNORMAL
THC UR QL: NEGATIVE
VENT BIPAP FIO2: 100 %
WBC # BLD AUTO: 10.33 THOUSAND/UL (ref 4.31–10.16)

## 2022-06-12 PROCEDURE — 94760 N-INVAS EAR/PLS OXIMETRY 1: CPT

## 2022-06-12 PROCEDURE — 82140 ASSAY OF AMMONIA: CPT

## 2022-06-12 PROCEDURE — 82947 ASSAY GLUCOSE BLOOD QUANT: CPT

## 2022-06-12 PROCEDURE — 93005 ELECTROCARDIOGRAM TRACING: CPT

## 2022-06-12 PROCEDURE — 70450 CT HEAD/BRAIN W/O DYE: CPT

## 2022-06-12 PROCEDURE — 36600 WITHDRAWAL OF ARTERIAL BLOOD: CPT

## 2022-06-12 PROCEDURE — 70486 CT MAXILLOFACIAL W/O DYE: CPT

## 2022-06-12 PROCEDURE — 82948 REAGENT STRIP/BLOOD GLUCOSE: CPT

## 2022-06-12 PROCEDURE — 03HY32Z INSERTION OF MONITORING DEVICE INTO UPPER ARTERY, PERCUTANEOUS APPROACH: ICD-10-PCS | Performed by: EMERGENCY MEDICINE

## 2022-06-12 PROCEDURE — 85730 THROMBOPLASTIN TIME PARTIAL: CPT | Performed by: NURSE PRACTITIONER

## 2022-06-12 PROCEDURE — 71260 CT THORAX DX C+: CPT

## 2022-06-12 PROCEDURE — 82805 BLOOD GASES W/O2 SATURATION: CPT

## 2022-06-12 PROCEDURE — 82805 BLOOD GASES W/O2 SATURATION: CPT | Performed by: NURSE PRACTITIONER

## 2022-06-12 PROCEDURE — 4A133J1 MONITORING OF ARTERIAL PULSE, PERIPHERAL, PERCUTANEOUS APPROACH: ICD-10-PCS | Performed by: EMERGENCY MEDICINE

## 2022-06-12 PROCEDURE — 74177 CT ABD & PELVIS W/CONTRAST: CPT

## 2022-06-12 PROCEDURE — 84295 ASSAY OF SERUM SODIUM: CPT

## 2022-06-12 PROCEDURE — 4A133B1 MONITORING OF ARTERIAL PRESSURE, PERIPHERAL, PERCUTANEOUS APPROACH: ICD-10-PCS | Performed by: EMERGENCY MEDICINE

## 2022-06-12 PROCEDURE — 94002 VENT MGMT INPAT INIT DAY: CPT

## 2022-06-12 PROCEDURE — 83605 ASSAY OF LACTIC ACID: CPT | Performed by: STUDENT IN AN ORGANIZED HEALTH CARE EDUCATION/TRAINING PROGRAM

## 2022-06-12 PROCEDURE — 80048 BASIC METABOLIC PNL TOTAL CA: CPT | Performed by: NURSE PRACTITIONER

## 2022-06-12 PROCEDURE — 0241U HB NFCT DS VIR RESP RNA 4 TRGT: CPT | Performed by: STUDENT IN AN ORGANIZED HEALTH CARE EDUCATION/TRAINING PROGRAM

## 2022-06-12 PROCEDURE — 99285 EMERGENCY DEPT VISIT HI MDM: CPT

## 2022-06-12 PROCEDURE — 99251 PR INITL INPATIENT CONSULT NEW/ESTAB PT 20 MIN: CPT | Performed by: NEUROLOGICAL SURGERY

## 2022-06-12 PROCEDURE — 93970 EXTREMITY STUDY: CPT

## 2022-06-12 PROCEDURE — 85014 HEMATOCRIT: CPT

## 2022-06-12 PROCEDURE — 99291 CRITICAL CARE FIRST HOUR: CPT | Performed by: SURGERY

## 2022-06-12 PROCEDURE — 80307 DRUG TEST PRSMV CHEM ANLYZR: CPT | Performed by: NURSE PRACTITIONER

## 2022-06-12 PROCEDURE — 87040 BLOOD CULTURE FOR BACTERIA: CPT | Performed by: STUDENT IN AN ORGANIZED HEALTH CARE EDUCATION/TRAINING PROGRAM

## 2022-06-12 PROCEDURE — 82805 BLOOD GASES W/O2 SATURATION: CPT | Performed by: STUDENT IN AN ORGANIZED HEALTH CARE EDUCATION/TRAINING PROGRAM

## 2022-06-12 PROCEDURE — 36415 COLL VENOUS BLD VENIPUNCTURE: CPT

## 2022-06-12 PROCEDURE — 82330 ASSAY OF CALCIUM: CPT

## 2022-06-12 PROCEDURE — 36625 INSERTION CATHETER ARTERY: CPT | Performed by: NURSE PRACTITIONER

## 2022-06-12 PROCEDURE — 85610 PROTHROMBIN TIME: CPT | Performed by: NEUROLOGICAL SURGERY

## 2022-06-12 PROCEDURE — 72125 CT NECK SPINE W/O DYE: CPT

## 2022-06-12 PROCEDURE — NC001 PR NO CHARGE: Performed by: NURSE PRACTITIONER

## 2022-06-12 PROCEDURE — 93306 TTE W/DOPPLER COMPLETE: CPT | Performed by: INTERNAL MEDICINE

## 2022-06-12 PROCEDURE — 80053 COMPREHEN METABOLIC PANEL: CPT | Performed by: STUDENT IN AN ORGANIZED HEALTH CARE EDUCATION/TRAINING PROGRAM

## 2022-06-12 PROCEDURE — 85379 FIBRIN DEGRADATION QUANT: CPT | Performed by: NURSE PRACTITIONER

## 2022-06-12 PROCEDURE — 82803 BLOOD GASES ANY COMBINATION: CPT

## 2022-06-12 PROCEDURE — 93306 TTE W/DOPPLER COMPLETE: CPT

## 2022-06-12 PROCEDURE — 83880 ASSAY OF NATRIURETIC PEPTIDE: CPT | Performed by: NURSE PRACTITIONER

## 2022-06-12 PROCEDURE — 86900 BLOOD TYPING SEROLOGIC ABO: CPT | Performed by: STUDENT IN AN ORGANIZED HEALTH CARE EDUCATION/TRAINING PROGRAM

## 2022-06-12 PROCEDURE — 85025 COMPLETE CBC W/AUTO DIFF WBC: CPT | Performed by: STUDENT IN AN ORGANIZED HEALTH CARE EDUCATION/TRAINING PROGRAM

## 2022-06-12 PROCEDURE — 84132 ASSAY OF SERUM POTASSIUM: CPT

## 2022-06-12 PROCEDURE — 86850 RBC ANTIBODY SCREEN: CPT | Performed by: STUDENT IN AN ORGANIZED HEALTH CARE EDUCATION/TRAINING PROGRAM

## 2022-06-12 PROCEDURE — 71045 X-RAY EXAM CHEST 1 VIEW: CPT

## 2022-06-12 PROCEDURE — 99292 CRITICAL CARE ADDL 30 MIN: CPT | Performed by: SURGERY

## 2022-06-12 PROCEDURE — 86901 BLOOD TYPING SEROLOGIC RH(D): CPT | Performed by: STUDENT IN AN ORGANIZED HEALTH CARE EDUCATION/TRAINING PROGRAM

## 2022-06-12 PROCEDURE — 84484 ASSAY OF TROPONIN QUANT: CPT | Performed by: NURSE PRACTITIONER

## 2022-06-12 RX ORDER — NALOXONE HYDROCHLORIDE 1 MG/ML
INJECTION INTRAMUSCULAR; INTRAVENOUS; SUBCUTANEOUS
Status: ACTIVE
Start: 2022-06-12 | End: 2022-06-12

## 2022-06-12 RX ORDER — OXYCODONE HYDROCHLORIDE 10 MG/1
10 TABLET ORAL EVERY 4 HOURS PRN
Status: DISCONTINUED | OUTPATIENT
Start: 2022-06-12 | End: 2022-06-12

## 2022-06-12 RX ORDER — DEXTROSE MONOHYDRATE 25 G/50ML
25 INJECTION, SOLUTION INTRAVENOUS ONCE
Status: COMPLETED | OUTPATIENT
Start: 2022-06-12 | End: 2022-06-12

## 2022-06-12 RX ORDER — ENOXAPARIN SODIUM 100 MG/ML
30 INJECTION SUBCUTANEOUS EVERY 12 HOURS
Status: DISCONTINUED | OUTPATIENT
Start: 2022-06-12 | End: 2022-06-12

## 2022-06-12 RX ORDER — OXYCODONE HYDROCHLORIDE 5 MG/1
5 TABLET ORAL EVERY 4 HOURS PRN
Status: DISCONTINUED | OUTPATIENT
Start: 2022-06-12 | End: 2022-06-12

## 2022-06-12 RX ORDER — ONDANSETRON 2 MG/ML
4 INJECTION INTRAMUSCULAR; INTRAVENOUS EVERY 6 HOURS PRN
Status: DISCONTINUED | OUTPATIENT
Start: 2022-06-12 | End: 2022-06-15 | Stop reason: HOSPADM

## 2022-06-12 RX ORDER — NALOXONE HYDROCHLORIDE 1 MG/ML
INJECTION INTRAMUSCULAR; INTRAVENOUS; SUBCUTANEOUS
Status: DISPENSED
Start: 2022-06-12 | End: 2022-06-12

## 2022-06-12 RX ORDER — ENOXAPARIN SODIUM 100 MG/ML
40 INJECTION SUBCUTANEOUS EVERY 12 HOURS SCHEDULED
Status: DISCONTINUED | OUTPATIENT
Start: 2022-06-12 | End: 2022-06-15 | Stop reason: HOSPADM

## 2022-06-12 RX ORDER — ACETAMINOPHEN 325 MG/1
650 TABLET ORAL EVERY 6 HOURS PRN
Status: DISCONTINUED | OUTPATIENT
Start: 2022-06-12 | End: 2022-06-15 | Stop reason: HOSPADM

## 2022-06-12 RX ORDER — SODIUM CHLORIDE, SODIUM GLUCONATE, SODIUM ACETATE, POTASSIUM CHLORIDE, MAGNESIUM CHLORIDE, SODIUM PHOSPHATE, DIBASIC, AND POTASSIUM PHOSPHATE .53; .5; .37; .037; .03; .012; .00082 G/100ML; G/100ML; G/100ML; G/100ML; G/100ML; G/100ML; G/100ML
75 INJECTION, SOLUTION INTRAVENOUS CONTINUOUS
Status: DISCONTINUED | OUTPATIENT
Start: 2022-06-12 | End: 2022-06-13

## 2022-06-12 RX ORDER — HYDROMORPHONE HCL IN WATER/PF 6 MG/30 ML
0.2 PATIENT CONTROLLED ANALGESIA SYRINGE INTRAVENOUS
Status: DISCONTINUED | OUTPATIENT
Start: 2022-06-12 | End: 2022-06-12

## 2022-06-12 RX ORDER — NALOXONE HYDROCHLORIDE 0.4 MG/ML
2 INJECTION, SOLUTION INTRAMUSCULAR; INTRAVENOUS; SUBCUTANEOUS ONCE
Status: COMPLETED | OUTPATIENT
Start: 2022-06-12 | End: 2022-06-12

## 2022-06-12 RX ORDER — ENOXAPARIN SODIUM 100 MG/ML
40 INJECTION SUBCUTANEOUS
Status: DISCONTINUED | OUTPATIENT
Start: 2022-06-13 | End: 2022-06-12

## 2022-06-12 RX ADMIN — SODIUM CHLORIDE, SODIUM GLUCONATE, SODIUM ACETATE, POTASSIUM CHLORIDE, MAGNESIUM CHLORIDE, SODIUM PHOSPHATE, DIBASIC, AND POTASSIUM PHOSPHATE 75 ML/HR: .53; .5; .37; .037; .03; .012; .00082 INJECTION, SOLUTION INTRAVENOUS at 20:28

## 2022-06-12 RX ADMIN — DEXTROSE MONOHYDRATE 25 ML: 25 INJECTION, SOLUTION INTRAVENOUS at 06:09

## 2022-06-12 RX ADMIN — SODIUM CHLORIDE, PRESERVATIVE FREE 0.04 MG: 5 INJECTION INTRAVENOUS at 06:39

## 2022-06-12 RX ADMIN — PERFLUTREN 0.8 ML/MIN: 6.52 INJECTION, SUSPENSION INTRAVENOUS at 14:42

## 2022-06-12 RX ADMIN — SODIUM CHLORIDE 3 G: 9 INJECTION, SOLUTION INTRAVENOUS at 16:00

## 2022-06-12 RX ADMIN — IOHEXOL 65 ML: 350 INJECTION, SOLUTION INTRAVENOUS at 04:21

## 2022-06-12 RX ADMIN — SODIUM CHLORIDE 3 G: 9 INJECTION, SOLUTION INTRAVENOUS at 22:00

## 2022-06-12 RX ADMIN — ENOXAPARIN SODIUM 40 MG: 40 INJECTION SUBCUTANEOUS at 22:00

## 2022-06-12 RX ADMIN — NALOXONE HYDROCHLORIDE 2 MG: 0.4 INJECTION, SOLUTION INTRAMUSCULAR; INTRAVENOUS; SUBCUTANEOUS at 08:29

## 2022-06-12 RX ADMIN — SODIUM CHLORIDE 3 G: 9 INJECTION, SOLUTION INTRAVENOUS at 09:56

## 2022-06-12 RX ADMIN — INSULIN HUMAN 10 UNITS: 100 INJECTION, SOLUTION PARENTERAL at 06:06

## 2022-06-12 NOTE — CONSULTS
Patient Name: Amandeep Fraser YOB: 1985    Medical Record No : [id number]     Admit/Registration Date: 6/12/2022  3:32 AM  Date of Consult: 06/12/22      Oral and Maxillofacial Surgery Consult Note    Assessment:  29 y o  male s/p fall down stairs now with hypercapnic respiratory failure  He sustained a mildly displaced bilateral nasal bone fracture  Plan/Recs:  No plan for acute surgical intervention  Follow up with [address] [place] [address] within 1 week of discharge  Avoid trauma to nasal area  CPAP okay as positioned  - Saline drops to BL nares TID to break up dried heme  May wait until off CPAP   - OMFS signing off    -----------------------------------------    HPI:  29 y o  male who presents with hypercapnic respiratory failure, and BL minimally displaced nasal bone fracture s/p fall down stairs  Pre-admission records reviewed  PMH/PSH/Meds/Allergies Reviewed  Social History     Socioeconomic History    Marital status: Single     Spouse name: Not on file    Number of children: Not on file    Years of education: Not on file    Highest education level: Not on file   Occupational History    Not on file   Tobacco Use    Smoking status: Not on file    Smokeless tobacco: Not on file   Substance and Sexual Activity    Alcohol use: Not on file    Drug use: Not on file    Sexual activity: Not on file   Other Topics Concern    Not on file   Social History Narrative    Not on file     Social Determinants of Health     Financial Resource Strain: Not on file   Food Insecurity: No Food Insecurity    Worried About Running Out of Food in the Last Year: Never true    Сергей of Food in the Last Year: Never true   Transportation Needs: No Transportation Needs    Lack of Transportation (Medical): No    Lack of Transportation (Non-Medical):  No   Physical Activity: Not on file   Stress: Not on file   Social Connections: Not on file   Intimate Partner Violence: Not on file   Housing Stability: Low Risk     Unable to Pay for Housing in the Last Year: No    Number of Places Lived in the Last Year: 1    Unstable Housing in the Last Year: No       Scheduled Medications  Current Facility-Administered Medications   Medication Dose Route Frequency Provider Last Rate    acetaminophen  650 mg Oral Q6H PRN [facility] [place], DO      ampicillin-sulbactam  3 g Intravenous Q6H Ashwini Forman, DO 3 g (06/12/22 0956)    HYDROmorphone  0 2 mg Intravenous Q3H PRN [facility], DO      multi-electrolyte  125 mL/hr Intravenous Continuous [facility] [place], DO      naloxone           naloxone           ondansetron  4 mg Intravenous Q6H PRN [facility], DO      oxyCODONE  10 mg Oral Q4H PRN [facility], DO      oxyCODONE  5 mg Oral Q4H PRN [facility] [place], DO         PRN Medications    acetaminophen    HYDROmorphone    ondansetron    oxyCODONE    oxyCODONE    Medication Infusions  multi-electrolyte, 125 mL/hr        Review of Systems   Unable to perform ROS: Mental status change       Vitals:    HR:  [107-130] 122  Resp:  [0-37] 20  BP: (144-181)/() 144/62  Wt Readings from Last 1 Encounters:   06/12/22 134 kg (294 lb 8 6 oz)     Ht Readings from Last 1 Encounters:   06/12/22 5' 8" (1 727 m)     Body mass index is 44 78 kg/m²    Respiratory  Report   Lab Data (Last 4 hours)      06/12 1128            pH, Arterial       7 295             pCO2, Arterial       75 9             pO2, Arterial       85 0             HCO3, Arterial       36 1             Base Excess, Arterial       7 0                  O2/Vent Data     None              Patient Lines/Drains/Airways Status     Active Airway     None              I/O:  Current Diet Order:        Diet Orders   (From admission, onward)             Start     Ordered    06/12/22 0520  Diet NPO  Diet effective now        References:    Nutrtion Support Algorithm Enteral vs  Parenteral   Question Answer Comment   Diet Type NPO RD to adjust diet per protocol? Yes        06/12/22 0519                 Intake/Output Summary (Last 24 hours) at 6/12/2022 1338  Last data filed at 6/12/2022 0728  Gross per 24 hour   Intake --   Output 200 ml   Net -200 ml       Labs:  Results from last 7 days   Lab Units 06/12/22  0354 06/12/22  0343   WBC Thousand/uL 10 33*  --    HEMOGLOBIN g/dL 12 6  --    I STAT HEMOGLOBIN g/dl  --  14 3   HEMATOCRIT % 44 2  --    HEMATOCRIT, ISTAT %  --  42   PLATELETS Thousands/uL 195  --      Results from last 7 days   Lab Units 06/12/22  1126 06/12/22  0354 06/12/22  0343   POTASSIUM mmol/L 4 5 5 9*  --    CHLORIDE mmol/L 105 105  --    CO2 mmol/L 37* 35*  --    CO2, I-STAT mmol/L  --   --  39*   BUN mg/dL 18 21  --    CREATININE mg/dL 0 85 1 19  --    GLUCOSE, ISTAT mg/dl  --   --  111   CALCIUM mg/dL 8 9 9 2  --      Results from last 7 days   Lab Units 06/12/22  1126 06/12/22  0354   INR   --  0 93   PTT seconds 25  --          Pain Management Panel    There is no flowsheet data to display  Imaging:   CT:  Bilateral, non-displaced nasal bone fractures      Physical Exam:   General: On CPAP, non-responsive  Neuro Exam: AAO x 0  Head: Normocephalic  Face: Mild facial edema   Ears: Pinna wnl bilaterally, no otorrhea   Eyes/Periorbital: PERRLA, UT perform full assessment, pt obtunded  Nose: External nose symmetrical/no gross deformity, + nasal crepitus, no nasal septal hematoma, crusted blood in nares BL  Oral Exam: Lips and mucosal surfaces wnl  Dentalalveolar Exam: Normal dentition, atraumatic and occlusion stable  Lymph/Neck Exam: Neck is soft, trachea is midline   Cardiovascular: regular rate and rhythm   Respiratory: Pt on CPAP  Gastrointestinal: nondistended  Genitourinary: Defer   Extremities: No gross peripheral edema    Briseida Power, DMD

## 2022-06-12 NOTE — PROCEDURES
Arterial Line Insertion    Date/Time: 6/12/2022 11:02 AM  Performed by: MARCELLA Ruby  Authorized by: MARCELLA Ruby     Patient location:  Bedside and ICU  Other Assisting Provider: Yes (comment) (Dr Hiral Johnson)    Consent:     Consent obtained:  Emergent situation  Universal protocol:     Immediately prior to procedure a time out was called: yes      Patient identity confirmed:  Arm band  Indications:     Indications: hemodynamic monitoring, multiple ABGs, continuous blood pressure monitoring and frequent labs / infusion    Anesthesia (see MAR for exact dosages): Anesthesia method:  None  Procedure details:     Location / Tip of Catheter:  Radial    Laterality:  Left    Needle gauge:  20 G    Placement technique:  Percutaneous, cutdown and Seldinger    Number of attempts:  3    Successful placement: yes      Transducer: waveform confirmed    Post-procedure details:     Post-procedure:  Secured with tape, sterile dressing applied and sutured    CMS:  Normal    Patient tolerance of procedure:   Tolerated well, no immediate complications  Comments:      Two attempts made on right side, successfully placed on left

## 2022-06-12 NOTE — LETTER
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

## 2022-06-12 NOTE — CASE MANAGEMENT
Case Management Assessment & Discharge Planning Note    Patient name Spenser Jiménez  Location ICU 11/ICU 11 MRN [id number]  : 1985 Date 2022       Current Admission Date: 2022  Current Admission Diagnosis:Acute encephalopathy   Patient Active Problem List    Diagnosis Date Noted    Acute encephalopathy 2022    Nasal bone fracture 2022    Acute respiratory failure with hypoxia and hypercapnia ([address] ) 2022    Anasarca 2022    Class 3 severe obesity due to excess calories in adult [facility]) 2022    Asthma 2022      LOS (days): 0  Geometric Mean LOS (GMLOS) (days):   Days to GMLOS:     OBJECTIVE:    Risk of Unplanned Readmission Score: 6 85         Current admission status: Inpatient       Preferred Pharmacy:   [facility]  No address on file      Primary Care Provider: No primary care provider on file  [facility]  Secondary Insurance:     ASSESSMENT:  Active Health Care Proxies    There are no active Health Care Proxies on file  Advance Directives  Does patient have a [address]?: No  Was patient offered paperwork?: Yes  Does patient currently have a Health Care decision maker?: Yes, please see Health Care Proxy section  Does patient have Advance Directives?: No  Was patient offered paperwork?: Yes  Primary Contact: Juan Jose Hoff (Mother) [phone]         Readmission Root Cause  30 Day Readmission: No    Patient Information  Admitted from[de-identified] Home  Mental Status: Alert  During Assessment patient was accompanied by: Not accompanied during assessment  Assessment information provided by[de-identified] Patient  Primary Caregiver: Self  Support Systems: Self, Spouse/significant other, Family members  [place] of Residence: [id number] do you live in?: [facility] entry access options   Select all that apply : Stairs  Number of steps to enter home : One Flight  Do the steps have railings?: Yes  Type of Current Residence: 2 story home  Upon entering residence, is there a bedroom on the main floor (no further steps)?: No  A bedroom is located on the following floor levels of residence (select all that apply):: 2nd Floor  Upon entering residence, is there a bathroom on the main floor (no further steps)?: No  Indicate which floors of current residence have a bathroom (select all the apply):: 2nd Floor  Number of steps to 2nd floor from main floor: One Flight (15-20 steps)  In the last 12 months, was there a time when you were not able to pay the mortgage or rent on time?: No  In the last 12 months, how many places have you lived?: 1  In the last 12 months, was there a time when you did not have a steady place to sleep or slept in a shelter (including now)?: No  Homeless/housing insecurity resource given?: N/A  Living Arrangements: Lives w/ Spouse/significant other  Is patient a ?: No  Is patient active with [facility])?: No    Activities of Daily Living Prior to Admission  Functional Status: Independent  Completes ADLs independently?: Yes  Ambulates independently?: Yes  Does patient use assisted devices?: No  Does patient currently own DME?: No  Does patient have a history of Outpatient Therapy (PT/OT)?: No  Does the patient have a history of Short-Term Rehab?: No  Does patient have a history of HHC?: No  Does patient currently have [facility]?: No         Patient Information Continued  Income Source: SSI/SSD  Does patient have prescription coverage?: Yes  Within the past 12 months, you worried that your food would run out before you got the money to buy more : Never true  Within the past 12 months, the food you bought just didn't last and you didn't have money to get more : Never true  Food insecurity resource given?: N/A  Does patient receive dialysis treatments?: No  Does patient have a history of substance abuse?: No  Does patient have a history of Mental Health Diagnosis?: No Means of Transportation  Means of Transport to Appts[de-identified] Drives Self  In the past 12 months, has lack of transportation kept you from medical appointments or from getting medications?: No  In the past 12 months, has lack of transportation kept you from meetings, work, or from getting things needed for daily living?: No  Was application for public transport provided?: N/A        DISCHARGE DETAILS:    Discharge planning discussed with[de-identified] patient's family  [place] of Choice: Yes     CM contacted family/caregiver?: Yes  Were Treatment Team discharge recommendations reviewed with patient/caregiver?: Yes  Did patient/caregiver verbalize understanding of patient care needs?: Yes  Were patient/caregiver advised of the risks associated with not following Treatment Team discharge recommendations?: Yes    Contacts  Patient Contacts: Kayla Liz (Mother) [phone]  Relationship to Patient[de-identified] Family  Contact Method: Phone  Phone Number: [phone]  Reason/Outcome: Continuity of Care, Emergency Contact, Discharge Planning    Cm met with pt, his gf, and his mother, to discuss the role of CM  Pt lives with his gf (who works) and three children (aged 8, 6, n 9) in a 2 story home which has 10STE and 15-20 steps inside  Pt's bedroom and bathroom (tub/shower with standard toilet) are located on the 2nd floor  Pt CAN drive, was working as a  and worked with heavy equipment in the past but pt's gf states he no longer works because of his condition (gf states the pt "randomly falls asleep in the middle of activities)  Pt is independent for all ADL/IADLs PTA  Pt's gf reports the pt falls 2-3x A DAY  Pt owns no DME and ambulates independently without a device  Pt enjoys playing [employer]  Pt doesn't have a living will  Pt uses [facility] [address]  Pt has no hx of mental health, substance abuse, IP rehab, or VNA  CM will appreciate therapy recommendations when appropriate  Pt reports having 2 Moderna Vaccinations       CM reviewed d/c planning process including the following: identifying help at home, patient preference for d/c planning needs, Discharge Lounge, [facility] Meds to Bed program, availability of treatment team to discuss questions or concerns patient and/or family may have regarding understanding medications and recognizing signs and symptoms once discharged  CM also encouraged patient to follow up with all recommended appointments after discharge  Patient advised of importance for patient and family to participate in managing patients medical well being

## 2022-06-12 NOTE — RESPIRATORY THERAPY NOTE
RT Protocol Note  Pastor Iverson 29 y o  male MRN: [id number]  Unit/Bed#: ED 14 Encounter: [id number]    Assessment    Active Problems:    * No active hospital problems  *      Home Pulmonary Medications:  NA      Home Devices/Therapy: (P) BiPAP/CPAP    No past medical history on file  Social History     Socioeconomic History    Marital status: Single     Spouse name: Not on file    Number of children: Not on file    Years of education: Not on file    Highest education level: Not on file   Occupational History    Not on file   Tobacco Use    Smoking status: Not on file    Smokeless tobacco: Not on file   Substance and Sexual Activity    Alcohol use: Not on file    Drug use: Not on file    Sexual activity: Not on file   Other Topics Concern    Not on file   Social History Narrative    Not on file     Social Determinants of Health     Financial Resource Strain: Not on file   Food Insecurity: Not on file   Transportation Needs: Not on file   Physical Activity: Not on file   Stress: Not on file   Social Connections: Not on file   Intimate Partner Violence: Not on file   Housing Stability: Not on file       Subjective         Objective    Physical Exam:   Assessment Type: (P) Assess only  General Appearance: (P) Lethargic  Respiratory Pattern: (P) Normal  Chest Assessment: (P) Chest expansion symmetrical  Bilateral Breath Sounds: (P) Diminished  O2 Device: (P) Called to bedside to place pt on bipap  Pt is obtunded at this time  Unable to answer questions on medical history  Breathe sounds are clear  Placed on bipap  No other interventions started at this time  WIll continue to moniter pt per protocol  Vitals:  Blood pressure (!) 178/72, pulse (!) 130, resp  rate 18, weight 134 kg (294 lb 8 6 oz), SpO2 (P) 100 %  Imaging and other studies: I have personally reviewed pertinent reports  O2 Device: (P) Called to bedside to place pt on bipap  Pt is obtunded at this time   Unable to answer questions on medical history  Breathe sounds are clear  Placed on bipap  No other interventions started at this time  WIll continue to moniter pt per protocol       Plan    Respiratory Plan: (P) Vent/NIV/HFNC        Resp Comments: (P) CQ

## 2022-06-12 NOTE — CONSULTS
Consultation - Neurosurgery   Brooke Lucero 29 y o  male MRN: [id number]  Unit/Bed#: ICU 11 Encounter: [id number]      Assessment/Plan     Assessment:  71-year-old gentleman post fall down stairs  Acute encephalopathy felt to be more consistent with hypercapnic respiratory failure  CT imaging is not particularly concerning for raised intracranial pressure or ANCA, however close observation is warranted  Could consider ICP monitor if further neurological decline requiring intubation  Currently GCS 11  Discussed with trauma attending at 10 15  Plan:  1  Continue monitor neurological examination closely  Repeat CT head if it drops GCS of more than 2 points requiring intubation  2  Avoid sedating medication  3  Medical management as per Trauma team   4  Please contact Neurosurgery if any new concerns arise  History of Present Illness     HPI: Brooke Lucero is a 29y o  year old male who presents with fall down stairs  Upon arrival the patient was noted to have nasal fracture and was GCS 15  Patient indicated that he was sleepwalking and fell down the stairs  Patient had decline in overall mental status felt to be secondary to hypercapnic respiratory failure  Repeat CT head was undertaken and there was some concern for possible signs of raised ICP and subarachnoid hemorrhage  Neurosurgery is consulted  Inpatient consult to Neurosurgery  Consult performed by: Fiona Choi MD  Consult ordered by: Antwan Silva MD          Review of Systems   Unable to perform ROS: Mental status change       Historical Information   No past medical history on file  No past surgical history on file  Social History     Substance and Sexual Activity   Alcohol Use Not on file     Social History     Substance and Sexual Activity   Drug Use Not on file     No existing history information found  No existing history information found    Social History     Tobacco Use   Smoking Status Not on file   Smokeless Tobacco Not on file     No family history on file  Meds/Allergies   all current active meds have been reviewed, current meds:   Current Facility-Administered Medications   Medication Dose Route Frequency    acetaminophen (TYLENOL) tablet 650 mg  650 mg Oral Q6H PRN    ampicillin-sulbactam (UNASYN) 3 g in sodium chloride 0 9 % 100 mL IVPB  3 g Intravenous Q6H    HYDROmorphone HCl (DILAUDID) injection 0 2 mg  0 2 mg Intravenous Q3H PRN    levETIRAcetam (KEPPRA) 500 mg in sodium chloride 0 9 % 100 mL IVPB  500 mg Intravenous Q12H [facility]    multi-electrolyte (PLASMALYTE-A/ISOLYTE-S PH 7 4) IV solution  125 mL/hr Intravenous Continuous    naloxone (NARCAN) 2 MG/2ML injection **ADS Override Pull**        naloxone (NARCAN) 2 MG/2ML injection **ADS Override Pull**        ondansetron (ZOFRAN) injection 4 mg  4 mg Intravenous Q6H PRN    oxyCODONE (ROXICODONE) immediate release tablet 10 mg  10 mg Oral Q4H PRN    oxyCODONE (ROXICODONE) IR tablet 5 mg  5 mg Oral Q4H PRN    and PTA meds:   None     Not on File    Objective     Intake/Output Summary (Last 24 hours) at 6/12/2022 1033  Last data filed at 6/12/2022 5993  Gross per 24 hour   Intake --   Output 200 ml   Net -200 ml       Physical Exam  Vitals reviewed  Pulmonary:      Effort: Respiratory distress present  Neurological:      GCS: GCS eye subscore is 3  GCS verbal subscore is 2  GCS motor subscore is 6  Comments: Moves all 4 limbs briskly to voice  Intermittently obeys commands  Purposeful in upper extremities  Opens eyes to voice but does not track  Grunting verbal response  No facial asymmetry noted  Neurologic Exam    Vitals:Blood pressure 144/62, pulse (!) 122, resp  rate 20, height 5' 8" (1 727 m), weight 134 kg (294 lb 8 6 oz), SpO2 100 %  ,Body mass index is 44 78 kg/m²  Lab Results:   I have personally reviewed pertinent results      Lab Results   Component Value Date    WBC 10 33 (H) 06/12/2022    HGB 12 6 06/12/2022    HCT 44 2 06/12/2022    MCV 86 06/12/2022     06/12/2022    MCH 24 4 (L) 06/12/2022    MCHC 28 5 (L) 06/12/2022    RDW 15 9 (H) 06/12/2022    MPV 11 8 06/12/2022    NRBC 0 06/12/2022    SODIUM 138 06/12/2022     06/12/2022    CO2 35 (H) 06/12/2022    BUN 21 06/12/2022    CREATININE 1 19 06/12/2022    GLUCOSE 111 06/12/2022    CALCIUM 9 2 06/12/2022    AST 72 (H) 06/12/2022    ALT 41 06/12/2022    ALKPHOS 78 06/12/2022    EGFR 78 06/12/2022       Imaging Studies: I have personally reviewed pertinent reports  and I have personally reviewed pertinent films in PACS     CT scan of the head without contrast dated June 12th, 2022 and compared to CT scan from alert in the day  Exam is partially degraded by motion artifact  No obvious intra-axial or extra-axial lesions  Ventricular system is stable in size  Basal cisterns appear patent  Gray-white differentiation appears intact  Overall, stable examination based on my interpretation, though radiologist raises suspicion for possible shearing injury and raised ICP  EKG, Pathology, and Other Studies: I have personally reviewed pertinent reports  VTE Prophylaxis: Sequential compression device Yvone Loupe)     Code Status: Level 1 - Full Code  Advance Directive and Living Will:      Power of :    POLST:      Counseling / Coordination of Care  Counseling/Coordination of Care: Total floor / unit time spent today 25 minutes  Greater than 50% of total time was spent with the patient and / or family counseling and / or coordination of care  A description of the counseling / coordination of care: see assessment and plan documentation above for description

## 2022-06-12 NOTE — ASSESSMENT & PLAN NOTE
· Suspect related to hypercarbia versus possible ingestion of an in-line will discuss with patient following improvement in mental status  · Trans arterial blood gases  · Repeat neuroimaging possibly concerning for increased pressure  · Appreciate Neurosurgery recommendations  · Hold any sedating medications  · Frequent orientation and delirium precautions

## 2022-06-12 NOTE — ASSESSMENT & PLAN NOTE
· Unclear severity  · Will follow up with patient in regards to home medications  · Respiratory protocol

## 2022-06-12 NOTE — ED NOTES
Trauma assessed pt and pt was hard to arouse  RN informed trauma resident this is new  Trauma at bedside evaluating pt       Bev Thompson RN  06/12/22 6849

## 2022-06-12 NOTE — PROGRESS NOTES
[address] [place] [address]  Transfer/Accept Note - Neelima Rivers 1985, 29 y o  male MRN: [id number]  Unit/Bed#: ICU 11 Encounter: [id number]  Primary Care Provider: No primary care provider on file  Date and time admitted to hospital: 6/12/2022  3:32 AM    Asthma  Assessment & Plan  · Unclear severity  · Will follow up with patient in regards to home medications  · Respiratory protocol    Class 3 severe obesity due to excess calories in adult [facility])  Assessment & Plan  · Encouraged therapeutic lifestyle changes  · Outpatient follow-up    Anasarca  Assessment & Plan  · Pending BNP  · If elevated will check echocardiogram  · Potentially consider diuresis    Acute respiratory failure with hypoxia and hypercapnia (HCC)  Assessment & Plan  · Continue noninvasive positive-pressure  · Trend arterial blood gases and follow neurologic exam  · Strongly encouraged patient to use noninvasive positive pressure at night and with naps during the day  · Strongly recommend pulmonary follow-up and use of noninvasive positive pressure at home    Nasal bone fracture  Assessment & Plan  · Pending OMFS consult  · Continue Unasyn for now    * Acute encephalopathy  Assessment & Plan  · Suspect related to hypercarbia versus possible ingestion of an in-line will discuss with patient following improvement in mental status  · Trans arterial blood gases  · Repeat neuroimaging possibly concerning for increased pressure  · Appreciate Neurosurgery recommendations  · Hold any sedating medications  · Frequent orientation and delirium precautions      ----------------------------------------------------------------------------------------  HPI/24hr events:  Patient is a 52-year-old male presenting on 6/12 following a fall at home while sleep walking and   On review of the chart he has a past medical history of obstructive sleep apnea, ongoing tobacco abuse, obesity class 3 an, asthma of uncertain severity    His traumatic workup was concerning for isolated nasal bone fractures  He was initially urologically within normal limits, however on reassessment later in the morning he was found to be profoundly altered  Initial VBG demonstrated a mild respiratory acidosis  A repeat CT scan was ordered  He became profoundly agitated initially in CT and on return to emergency room was again significantly about 100  Arterial blood gas at that time demonstrated a more severe respiratory acidosis, his BiPAP settings were increased, and he was referred to the step-down unit for admission  Patient appropriate for transfer out of the ICU today?: No  Disposition: Continue Stepdown Level 1 level of care   Code Status: Level 1 - Full Code  ---------------------------------------------------------------------------------------  SUBJECTIVE  Get off me    Review of Systems   Unable to perform ROS: Mental status change       ---------------------------------------------------------------------------------------  OBJECTIVE    Vitals   Vitals:    06/12/22 0617 06/12/22 0700 06/12/22 0742 06/12/22 0800   BP:       Pulse:       Resp:  20  20   SpO2: 100% 100% 100%    Weight:       Height:  5' 8" (1 727 m)       No data recorded  Current:            Respiratory:  SpO2: SpO2: 100 %, SpO2 Activity:  , SpO2 Device: O2 Device: Non-rebreather mask       Invasive/non-invasive ventilation settings   Respiratory  Report   Lab Data (Last 4 hours)      06/12 0733            pH, Arterial       7 195             pCO2, Arterial       72 1             pO2, Arterial       89 7             HCO3, Arterial       27 2             Base Excess, Arterial       -2 5                  O2/Vent Data       06/12 0617   Most Recent        Non-Invasive Ventilation Mode BiPAP  BiPAP                  Physical Exam  Constitutional:       General: He is in acute distress  Appearance: He is obese  He is ill-appearing  Interventions: Face mask in place     VEL: Head: Normocephalic and atraumatic  Mouth/Throat:      Mouth: Mucous membranes are dry  Eyes:      Pupils: Pupils are equal, round, and reactive to light  Cardiovascular:      Rate and Rhythm: Normal rate and regular rhythm  Pulmonary:      Effort: Respiratory distress present  Breath sounds: Normal breath sounds  Abdominal:      General: There is distension  Palpations: Abdomen is soft  Tenderness: There is no abdominal tenderness  Genitourinary:     Comments: Wan in place with yellow urine  Musculoskeletal:         General: No signs of injury  Right lower leg: Edema (3+) present  Left lower leg: Edema ( 3+) present  Skin:     General: Skin is warm and dry  Neurological:      Mental Status: He is lethargic  GCS: GCS eye subscore is 2  GCS verbal subscore is 4  GCS motor subscore is 6               Laboratory and Diagnostics:  Results from last 7 days   Lab Units 06/12/22  0354 06/12/22  0343   WBC Thousand/uL 10 33*  --    HEMOGLOBIN g/dL 12 6  --    I STAT HEMOGLOBIN g/dl  --  14 3   HEMATOCRIT % 44 2  --    HEMATOCRIT, ISTAT %  --  42   PLATELETS Thousands/uL 195  --    NEUTROS PCT % 73  --    MONOS PCT % 9  --      Results from last 7 days   Lab Units 06/12/22  0354 06/12/22  0343   SODIUM mmol/L 138  --    POTASSIUM mmol/L 5 9*  --    CHLORIDE mmol/L 105  --    CO2 mmol/L 35*  --    CO2, I-STAT mmol/L  --  39*   ANION GAP mmol/L -2*  --    BUN mg/dL 21  --    CREATININE mg/dL 1 19  --    CALCIUM mg/dL 9 2  --    GLUCOSE RANDOM mg/dL 103  --    ALT U/L 41  --    AST U/L 72*  --    ALK PHOS U/L 78  --    ALBUMIN g/dL 3 3*  --    TOTAL BILIRUBIN mg/dL 0 45  --           Results from last 7 days   Lab Units 06/12/22  0354   INR  0 93          Results from last 7 days   Lab Units 06/12/22  0730   LACTIC ACID mmol/L 1 2     ABG:  Results from last 7 days   Lab Units 06/12/22  0733   PH ART  7 195*   PCO2 ART mm Hg 72 1*   PO2 ART mm Hg 89 7   HCO3 ART mmol/L 27 2 BASE EXC ART mmol/L -2 5   ABG SOURCE  Radial, Right     VBG:  Results from last 7 days   Lab Units 06/12/22  0733   ABG SOURCE  Radial, Right           Micro        EKG:  Reviewed telemetry demonstrates sinus rhythm  Imaging: I have personally reviewed pertinent reports  and I have personally reviewed pertinent films in PACS    Intake and Output  I/O       06/10 0701 06/11 0700 06/11 0701  06/12 0700 06/12 0701 06/13 0700    Urine (mL/kg/hr)   200 (0 3)    Total Output   200    Net   -200                 Height and Weights   Height: 5' 8" (172 7 cm)  IBW (Ideal Body Weight): 68 4 kg  Body mass index is 44 78 kg/m²  Weight (last 2 days)     Date/Time Weight    06/12/22 03:38:49 134 (294 53)            Nutrition       Diet Orders   (From admission, onward)             Start     Ordered    06/12/22 0520  Diet NPO  Diet effective now        References:    Nutrtion Support Algorithm Enteral vs  Parenteral   Question Answer Comment   Diet Type NPO    RD to adjust diet per protocol?  Yes        06/12/22 0519                  Active Medications  Scheduled Meds:  Current Facility-Administered Medications   Medication Dose Route Frequency Provider Last Rate    acetaminophen  650 mg Oral Q6H PRN Amando Gouldter, DO      ampicillin-sulbactam  3 g Intravenous Q6H Joesph Forman DO 3 g (06/12/22 0956)    HYDROmorphone  0 2 mg Intravenous Q3H PRN Ramond Bryannater, DO      levETIRAcetam  500 mg Intravenous Q12H [address] Funmilayo Forman  mg (06/12/22 1039)    multi-electrolyte  125 mL/hr Intravenous Continuous Ramond Bryannater, DO      naloxone           naloxone           ondansetron  4 mg Intravenous Q6H PRN Ramond Bryannater, DO      oxyCODONE  10 mg Oral Q4H PRN Ramond Bryannater, DO      oxyCODONE  5 mg Oral Q4H PRN Joesph Forman DO       Continuous Infusions:  multi-electrolyte, 125 mL/hr      PRN Meds:   acetaminophen, 650 mg, Q6H PRN  HYDROmorphone, 0 2 mg, Q3H PRN  ondansetron, 4 mg, Q6H PRN  oxyCODONE, 10 mg, Q4H PRN  oxyCODONE, 5 mg, Q4H PRN        Invasive Devices Review  Invasive Devices  Report    Peripheral Intravenous Line  Duration           Peripheral IV 06/12/22 Right Hand <1 day          Arterial Line  Duration           Arterial Line 06/12/22 Radial <1 day          Drain  Duration           Urethral Catheter Temperature probe 16 Fr  <1 day                Rationale for remaining devices:  IV access, accurate I&Os  ---------------------------------------------------------------------------------------  Advance Directive and Living Will:      Power of :    POLST:    ---------------------------------------------------------------------------------------  Care Time Delivered:   No Critical Care time spent       [facility]      Portions of the record may have been created with voice recognition software  Occasional wrong word or "sound a like" substitutions may have occurred due to the inherent limitations of voice recognition software    Read the chart carefully and recognize, using context, where substitutions have occurred

## 2022-06-12 NOTE — H&P
H&P - Trauma   Johnny Zimmer 29 y o  male MRN: [id number]  Unit/Bed#: ED 14 Encounter: [id number]    Trauma Alert: Level B   Model of Arrival: Ambulance    Trauma Team: Attending Nelson Eddy and Residents Susana Henao  Consultants:     Oral Maxillofacial: routine consult; Epic consult order placed; Assessment/Plan   Active Problems / Assessment:   S/p fall down 20 stairs while sleep walking  GCS 15  Bilateral nasal bone fracture   Hyperkalemia  Hypercapnic respiratory failure      Plan:   Trauma admission - med surg  OMFS consult for nasal bone fracture  NPO  IV unasyn  Bipap  Insulin +d50  Prn analgesia  DVT ppx   PT/OT  CPAP for history of JOSE      History of Present Illness     Chief Complaint: None  Mechanism:Fall     HPI:    Johnny Zimmer is a 29 y o  male with a past medical history of JOSE on CPAP who presents as a level B trauma via EMS after a fall down 20 stairs this evening  Patient had unknown LOC and reports he was sleepwalking at the time of fall  He denies pain on arrival, GCS 15 on face mask O2 at 6L  Patient has noted right septal deviation  Review of Systems   Unable to perform ROS: Acuity of condition   Constitutional: Negative for chills and fever  HENT: Positive for nosebleeds  + nasal pain    Eyes: Negative for visual disturbance  Respiratory: Negative for chest tightness and shortness of breath  Cardiovascular: Negative for chest pain and leg swelling  Gastrointestinal: Negative for abdominal distention, abdominal pain, diarrhea, nausea and vomiting  Endocrine: Negative  Genitourinary: Negative for difficulty urinating  Musculoskeletal: Negative for back pain and neck pain  Skin: Negative for wound  Allergic/Immunologic: Negative  Neurological: Negative for weakness, numbness and headaches  Hematological: Negative  Psychiatric/Behavioral: Negative  12-point, complete review of systems was reviewed and negative except as stated above  Historical Information     No past medical history on file  No past surgical history on file  Unable to obtain history due to N/A         There is no immunization history on file for this patient  Last Tetanus: N/A  Family History: Non-contributory     Meds/Allergies   all current active meds have been reviewed, current meds:   No current facility-administered medications for this encounter  and PTA meds:   None     Allergies have not been reviewed; Not on File    Objective   Initial Vitals:   Pulse: (!) 116 (06/12/22 0338)  Respirations: (!) 24 (06/12/22 0338)  Blood Pressure: 145/60 (06/12/22 0338)    Primary Survey:   Airway:        Status: patent;        Pre-hospital Interventions: none        Hospital Interventions: none  Breathing:        Pre-hospital Interventions: oxygen       Effort: normal       Right breath sounds: normal       Left breath sounds: normal  Circulation:        Rhythm: regular       Rate: regular   Right Pulses Left Pulses    R radial: 2+  R femoral: 2+  R pedal: 2+     L radial: 2+  L femoral: 2+  L pedal: 2+       Disability:        GCS: Eye: 4; Verbal: 5 Motor: 6 Total: 15       Right Pupil: 3 mm;  round;  reactive         Left Pupil:  3 mm;  round;  reactive      R Motor Strength L Motor Strength    R : 5/5  R dorsiflex: 5/5  R plantarflex: 5/5 L : 5/5  L dorsiflex: 5/5  L plantarflex: 5/5        Sensory:  No sensory deficit  Exposure:       Completed: Yes      Secondary Survey:  Physical Exam  Constitutional:       General: He is not in acute distress  Appearance: He is morbidly obese  He is ill-appearing  Interventions: Cervical collar and face mask in place  HENT:      Head: Normocephalic  No right periorbital erythema or left periorbital erythema  Right Ear: Hearing and external ear normal       Left Ear: Hearing and external ear normal       Nose: Nasal deformity, septal deviation (Right) and nasal tenderness present        Right Nostril: Epistaxis present  Left Nostril: Epistaxis present  Mouth/Throat:      Lips: Pink  Mouth: Mucous membranes are moist       Dentition: Normal dentition  Tongue: No lesions  Pharynx: Oropharynx is clear  Eyes:      General:         Right eye: No discharge  Left eye: No discharge  Extraocular Movements: Extraocular movements intact  Conjunctiva/sclera: Conjunctivae normal       Pupils: Pupils are equal, round, and reactive to light  Neck:      Comments: C collar  Cardiovascular:      Rate and Rhythm: Regular rhythm  Tachycardia present  Pulses: Normal pulses  Heart sounds: Normal heart sounds  Pulmonary:      Effort: Tachypnea present  No accessory muscle usage or respiratory distress  Breath sounds: Normal breath sounds  Abdominal:      General: Abdomen is protuberant  There is no distension  Palpations: Abdomen is soft  Tenderness: There is no abdominal tenderness  There is no guarding or rebound  Genitourinary:     Penis: Normal        Testes: Normal    Musculoskeletal:         General: No tenderness, deformity or signs of injury  Normal range of motion  Right shoulder: No deformity, tenderness or bony tenderness  Left shoulder: No deformity, tenderness or bony tenderness  Right upper arm: No tenderness or bony tenderness  Left upper arm: No tenderness or bony tenderness  Right forearm: No tenderness or bony tenderness  Left forearm: No tenderness or bony tenderness  Cervical back: No deformity, tenderness or bony tenderness  Thoracic back: No deformity, tenderness or bony tenderness  Lumbar back: No deformity, tenderness or bony tenderness  Right hip: No deformity, tenderness or bony tenderness  Left hip: No deformity, tenderness or bony tenderness  Right upper leg: No tenderness  Left upper leg: No tenderness  Right lower leg: No tenderness  Edema present        Left lower leg: No tenderness  Edema present  Skin:     General: Skin is warm and dry  Capillary Refill: Capillary refill takes less than 2 seconds  Coloration: Skin is not jaundiced  Findings: No lesion  Neurological:      General: No focal deficit present  Mental Status: He is alert and oriented to person, place, and time  GCS: GCS eye subscore is 4  GCS verbal subscore is 5  GCS motor subscore is 6  Cranial Nerves: Cranial nerves are intact  Sensory: Sensation is intact  Psychiatric:         Attention and Perception: Attention and perception normal          Mood and Affect: Affect normal  Mood is anxious  Speech: Speech normal          Behavior: Behavior normal  Behavior is not agitated or aggressive  Behavior is cooperative  Thought Content: Thought content normal          Invasive Devices  Report    Peripheral Intravenous Line  Duration           Peripheral IV 06/12/22 Right Hand <1 day              Lab Results:   BMP/CMP:   Lab Results   Component Value Date    CO2 39 (H) 06/12/2022    GLUCOSE 111 06/12/2022   , CBC:   Lab Results   Component Value Date    HGB 14 3 06/12/2022    HCT 42 06/12/2022    and Coagulation: No results found for: PT, INR, APTT    Imaging Results: I have personally reviewed pertinent reports  Chest Xray(s): N/A   FAST exam(s): N/A   CT Scan(s): 6/12 CT head: neg  6/12 CT C Spine: neg  6/12 CT Facial bones: bilateral nasal bone fractures extending into the nasal septum  6/12 CT CAP: abdominal wall subq stranding, nonspecific    Additional Xray(s): N/A     Other Studies: N/A    Code Status: No Order  Advance Directive and Living Will:      Power of :    POLST:    I have spent 25 minutes with Patient  today in which greater than 50% of this time was spent in counseling/coordination of care regarding Diagnostic results, Prognosis, Risk factor reductions and Impressions

## 2022-06-12 NOTE — ASSESSMENT & PLAN NOTE
· Continue noninvasive positive-pressure  · Trend arterial blood gases and follow neurologic exam  · Strongly encouraged patient to use noninvasive positive pressure at night and with naps during the day  · Strongly recommend pulmonary follow-up and use of noninvasive positive pressure at home

## 2022-06-12 NOTE — ED NOTES
Attempted to take patient to CT scan on non rebreather, O2 sat dropped, provider made aware, unable to obtain scan       Yong Wren RN  06/12/22 4342

## 2022-06-12 NOTE — ED NOTES
Pt woke up and removed his own C-Collar  C-Collar placed back on pt  RN explained to pt he must keep the collar on and not to remove it  Pt apologized and verbalized understanding  Rn will continue to monitor       Enrico Later, RN  06/12/22 0277

## 2022-06-13 LAB
ANION GAP SERPL CALCULATED.3IONS-SCNC: 4 MMOL/L (ref 4–13)
ARTERIAL PATENCY WRIST A: YES
ATRIAL RATE: 117 BPM
BASE EXCESS BLDA CALC-SCNC: 6 MMOL/L
BASOPHILS # BLD AUTO: 0.02 THOUSANDS/ΜL (ref 0–0.1)
BASOPHILS NFR BLD AUTO: 0 % (ref 0–1)
BUN SERPL-MCNC: 16 MG/DL (ref 5–25)
CALCIUM SERPL-MCNC: 8.3 MG/DL (ref 8.3–10.1)
CHLORIDE SERPL-SCNC: 104 MMOL/L (ref 100–108)
CO2 SERPL-SCNC: 34 MMOL/L (ref 21–32)
CREAT SERPL-MCNC: 1.01 MG/DL (ref 0.6–1.3)
EOSINOPHIL # BLD AUTO: 0.19 THOUSAND/ΜL (ref 0–0.61)
EOSINOPHIL NFR BLD AUTO: 3 % (ref 0–6)
ERYTHROCYTE [DISTWIDTH] IN BLOOD BY AUTOMATED COUNT: 15.7 % (ref 11.6–15.1)
GFR SERPL CREATININE-BSD FRML MDRD: 95 ML/MIN/1.73SQ M
GLUCOSE SERPL-MCNC: 92 MG/DL (ref 65–140)
GLUCOSE SERPL-MCNC: 95 MG/DL (ref 65–140)
GLUCOSE SERPL-MCNC: 96 MG/DL (ref 65–140)
GLUCOSE SERPL-MCNC: 96 MG/DL (ref 65–140)
GLUCOSE SERPL-MCNC: 99 MG/DL (ref 65–140)
HCO3 BLDA-SCNC: 32.4 MMOL/L (ref 22–28)
HCT VFR BLD AUTO: 38.1 % (ref 36.5–49.3)
HGB BLD-MCNC: 11.2 G/DL (ref 12–17)
IMM GRANULOCYTES # BLD AUTO: 0.03 THOUSAND/UL (ref 0–0.2)
IMM GRANULOCYTES NFR BLD AUTO: 0 % (ref 0–2)
IPAP: 28
LYMPHOCYTES # BLD AUTO: 1.24 THOUSANDS/ΜL (ref 0.6–4.47)
LYMPHOCYTES NFR BLD AUTO: 17 % (ref 14–44)
MAGNESIUM SERPL-MCNC: 2.2 MG/DL (ref 1.6–2.6)
MCH RBC QN AUTO: 24.9 PG (ref 26.8–34.3)
MCHC RBC AUTO-ENTMCNC: 29.4 G/DL (ref 31.4–37.4)
MCV RBC AUTO: 85 FL (ref 82–98)
MONOCYTES # BLD AUTO: 0.69 THOUSAND/ΜL (ref 0.17–1.22)
MONOCYTES NFR BLD AUTO: 10 % (ref 4–12)
NEUTROPHILS # BLD AUTO: 5.08 THOUSANDS/ΜL (ref 1.85–7.62)
NEUTS SEG NFR BLD AUTO: 70 % (ref 43–75)
NON VENT- BIPAP: ABNORMAL
NRBC BLD AUTO-RTO: 0 /100 WBCS
O2 CT BLDA-SCNC: 16 ML/DL (ref 16–23)
OXYHGB MFR BLDA: 94.9 % (ref 94–97)
P AXIS: 49 DEGREES
PCO2 BLDA: 55.6 MM HG (ref 36–44)
PEEP MAX SETTING VENT: 6 CM[H2O]
PH BLDA: 7.38 [PH] (ref 7.35–7.45)
PHOSPHATE SERPL-MCNC: 2.8 MG/DL (ref 2.7–4.5)
PLATELET # BLD AUTO: 162 THOUSANDS/UL (ref 149–390)
PMV BLD AUTO: 10.9 FL (ref 8.9–12.7)
PO2 BLDA: 84.4 MM HG (ref 75–129)
POTASSIUM SERPL-SCNC: 3.8 MMOL/L (ref 3.5–5.3)
PR INTERVAL: 136 MS
QRS AXIS: 87 DEGREES
QRSD INTERVAL: 82 MS
QT INTERVAL: 312 MS
QTC INTERVAL: 435 MS
RBC # BLD AUTO: 4.49 MILLION/UL (ref 3.88–5.62)
SODIUM SERPL-SCNC: 142 MMOL/L (ref 136–145)
SPECIMEN SOURCE: ABNORMAL
T WAVE AXIS: 29 DEGREES
VENT BIPAP FIO2: 50 %
VENTRICULAR RATE: 117 BPM
WBC # BLD AUTO: 7.25 THOUSAND/UL (ref 4.31–10.16)

## 2022-06-13 PROCEDURE — 93010 ELECTROCARDIOGRAM REPORT: CPT | Performed by: INTERNAL MEDICINE

## 2022-06-13 PROCEDURE — 93970 EXTREMITY STUDY: CPT | Performed by: SURGERY

## 2022-06-13 PROCEDURE — 97166 OT EVAL MOD COMPLEX 45 MIN: CPT

## 2022-06-13 PROCEDURE — 97163 PT EVAL HIGH COMPLEX 45 MIN: CPT

## 2022-06-13 PROCEDURE — 83735 ASSAY OF MAGNESIUM: CPT

## 2022-06-13 PROCEDURE — 85025 COMPLETE CBC W/AUTO DIFF WBC: CPT

## 2022-06-13 PROCEDURE — 99233 SBSQ HOSP IP/OBS HIGH 50: CPT | Performed by: SURGERY

## 2022-06-13 PROCEDURE — 82948 REAGENT STRIP/BLOOD GLUCOSE: CPT

## 2022-06-13 PROCEDURE — 99223 1ST HOSP IP/OBS HIGH 75: CPT | Performed by: INTERNAL MEDICINE

## 2022-06-13 PROCEDURE — NC001 PR NO CHARGE: Performed by: SURGERY

## 2022-06-13 PROCEDURE — 99232 SBSQ HOSP IP/OBS MODERATE 35: CPT | Performed by: PHYSICIAN ASSISTANT

## 2022-06-13 PROCEDURE — 94760 N-INVAS EAR/PLS OXIMETRY 1: CPT

## 2022-06-13 PROCEDURE — 82805 BLOOD GASES W/O2 SATURATION: CPT | Performed by: PHYSICIAN ASSISTANT

## 2022-06-13 PROCEDURE — 80048 BASIC METABOLIC PNL TOTAL CA: CPT

## 2022-06-13 PROCEDURE — 84100 ASSAY OF PHOSPHORUS: CPT

## 2022-06-13 PROCEDURE — 94003 VENT MGMT INPAT SUBQ DAY: CPT

## 2022-06-13 RX ORDER — FLUTICASONE FUROATE AND VILANTEROL 100; 25 UG/1; UG/1
1 POWDER RESPIRATORY (INHALATION) DAILY
Status: DISCONTINUED | OUTPATIENT
Start: 2022-06-13 | End: 2022-06-15

## 2022-06-13 RX ORDER — FLUTICASONE FUROATE AND VILANTEROL 100; 25 UG/1; UG/1
1 POWDER RESPIRATORY (INHALATION) DAILY
Status: DISCONTINUED | OUTPATIENT
Start: 2022-06-14 | End: 2022-06-13

## 2022-06-13 RX ORDER — ALBUTEROL SULFATE 90 UG/1
2 AEROSOL, METERED RESPIRATORY (INHALATION) EVERY 4 HOURS PRN
Status: DISCONTINUED | OUTPATIENT
Start: 2022-06-13 | End: 2022-06-15 | Stop reason: HOSPADM

## 2022-06-13 RX ORDER — ALBUTEROL SULFATE 90 UG/1
2 AEROSOL, METERED RESPIRATORY (INHALATION) EVERY 4 HOURS PRN
Status: DISCONTINUED | OUTPATIENT
Start: 2022-06-13 | End: 2022-06-13

## 2022-06-13 RX ORDER — FUROSEMIDE 10 MG/ML
20 INJECTION INTRAMUSCULAR; INTRAVENOUS ONCE
Status: COMPLETED | OUTPATIENT
Start: 2022-06-13 | End: 2022-06-13

## 2022-06-13 RX ADMIN — ALBUTEROL SULFATE 2 PUFF: 90 AEROSOL, METERED RESPIRATORY (INHALATION) at 18:30

## 2022-06-13 RX ADMIN — SODIUM CHLORIDE 3 G: 9 INJECTION, SOLUTION INTRAVENOUS at 04:11

## 2022-06-13 RX ADMIN — ENOXAPARIN SODIUM 40 MG: 40 INJECTION SUBCUTANEOUS at 09:30

## 2022-06-13 RX ADMIN — FLUTICASONE FUROATE AND VILANTEROL TRIFENATATE 1 PUFF: 100; 25 POWDER RESPIRATORY (INHALATION) at 18:28

## 2022-06-13 RX ADMIN — FUROSEMIDE 20 MG: 10 INJECTION, SOLUTION INTRAMUSCULAR; INTRAVENOUS at 00:51

## 2022-06-13 RX ADMIN — ENOXAPARIN SODIUM 40 MG: 40 INJECTION SUBCUTANEOUS at 21:42

## 2022-06-13 NOTE — CONSULTS
PULMONOLOGY CONSULT NOTE     Name: Nya Guzmán   Age & Sex: 29 y o  male   MRN: [id number]  Unit/Bed#: ICU 11   Encounter: [id number]        Reason for consultation: Acute respiratory failure with hypoxia and hypercapnia     Requesting physician: Varun Wills MD    Assessment:   Acute on chronic respiratory failure with hypoxia and hypercapnia  Severe JOSE/OHS with AHI of 117 6  Mild asthma  Morbid obesity  Active tobacco use  Fall with comminuted bilateral nasal bone fractures  Acute metabolic encephalopathy - resolved    Plan:  · Previously diagnosed severe sleep apnea  · Was scheduled for in-lab CPAP titration study to see if he could be qualified for home BiPAP but due to insurance not being accepted he was not able to undergo the study  · Will attempt to qualify him for home BiPAP while he is her in the hospital  · Will perform overnight pulse oximetery study tonight 6/13 while on 2L NC  He should not be placed on BIPAP overnight tonight  We will look for desaturation of <88% for >5 minutes  · We will also obtain an ABG in the AM off of supplemental O2 to assess for hypercapnia with a pCO2 of >52  · These results are only valid for 48 hours and he would need to be discharged within that time frame in order for him to qualify  · This was discussed with respiratory therapy team  · He states he only takes albuterol at home for control of his asthma  No PFTs on file  Can have outpatient follow up with his PCP  May require LABA/ICS if his symptoms are poorly controlled  · He is actively smoking cigarettes, recommended complete tobacco cessation  · Supplement O2 to maintain a SpO2 of >88%  May need ambulatory pulse oximetery evaluation prior to DC to determine home O2 needs  · Provided incentive spirometer to help improved resting SpO2   Advance activity as tolerated  · All other care per primary team    History of Present Illness   HPI:  Nya Guzmán is a 29 y o  male who has a past medical history of severe JOSE, morbid obesity, mild asthma only on BHUPENDRA therapy presented to [facility] for evaluation after a fall and found to have comminuted bilateral nasal bone fractures  He was admitted to the trauma ICU and monitored overnight  Evaluated by OMFS with acute surgical intervention  As part of the evaluation there was concern for him not having CPAP at home due to delay with insurance approvals and he was scheduled for a CPAP titration study that was canceled due to insurance not being accepted  Pulmonary has been consulted to assist in attempting to qualify the patient for PAP therapy  On examination today the patient was frustarted stating he did not understand why he could not get his PAP therapy and expressed frustration with having to have multiple sleep studies in order to get PAP therapy  He states he is chronically tired and falls a sleep within seconds and has lead to this traumatic event  Review of systems:  12 point review of systems was completed and was otherwise negative except as listed in HPI  Historical Information   No past medical history on file  No past surgical history on file  No family history on file      Occupational History: factory work    Social History:   Social History     Socioeconomic History    Marital status: Single     Spouse name: Not on file    Number of children: Not on file    Years of education: Not on file    Highest education level: Not on file   Occupational History    Not on file   Tobacco Use    Smoking status: Not on file    Smokeless tobacco: Not on file   Substance and Sexual Activity    Alcohol use: Not on file    Drug use: Not on file    Sexual activity: Not on file   Other Topics Concern    Not on file   Social History Narrative    Not on file     Social Determinants of Health     Financial Resource Strain: Not on file   Food Insecurity: No Food Insecurity    Worried About [address] in the Last Year: Never true    Сергей of [employer] in the Last Year: Never true   Transportation Needs: No Transportation Needs    Lack of Transportation (Medical): No    Lack of Transportation (Non-Medical): No   Physical Activity: Not on file   Stress: Not on file   Social Connections: Not on file   Intimate Partner Violence: Not on file   Housing Stability: Low Risk     Unable to Pay for Housing in the Last Year: No    Number of Places Lived in the Last Year: 1    Unstable Housing in the Last Year: No         Meds/Allergies   Current Facility-Administered Medications   Medication Dose Route Frequency    acetaminophen (TYLENOL) tablet 650 mg  650 mg Oral Q6H PRN    enoxaparin (LOVENOX) subcutaneous injection 40 mg  40 mg Subcutaneous Q12H [address]    ondansetron (ZOFRAN) injection 4 mg  4 mg Intravenous Q6H PRN     No medications prior to admission  No Known Allergies    Vitals: Blood pressure (!) 102/38, pulse 98, temperature 98 24 °F (36 8 °C), resp  rate 16, height 5' 8" (1 727 m), weight 133 kg (294 lb), SpO2 90 % , RA, Body mass index is 44 7 kg/m²  Intake/Output Summary (Last 24 hours) at 6/13/2022 1227  Last data filed at 6/13/2022 0800  Gross per 24 hour   Intake 703 75 ml   Output 1675 ml   Net -971 25 ml       Physical Exam  Vitals and nursing note reviewed  Constitutional:       General: He is not in acute distress  Appearance: Normal appearance  He is well-developed  He is obese  He is not ill-appearing or toxic-appearing  Interventions: He is not intubated  HENT:      Head: Normocephalic  Abrasion and left periorbital erythema present  Right Ear: External ear normal       Left Ear: External ear normal       Nose: Nose normal       Mouth/Throat:      Mouth: Mucous membranes are moist       Pharynx: Oropharynx is clear  Eyes:      General: No scleral icterus  Conjunctiva/sclera: Conjunctivae normal    Cardiovascular:      Rate and Rhythm: Normal rate and regular rhythm  Pulses: Normal pulses        Heart sounds: Normal heart sounds  No murmur heard  No friction rub  No gallop  Pulmonary:      Effort: Pulmonary effort is normal  No tachypnea, accessory muscle usage or respiratory distress  He is not intubated  Breath sounds: Normal air entry  No decreased air movement  Decreased breath sounds present  No wheezing, rhonchi or rales  Abdominal:      General: Abdomen is flat  Bowel sounds are normal       Palpations: Abdomen is soft  Musculoskeletal:         General: No swelling or tenderness  Cervical back: Neck supple  No tenderness  Skin:     General: Skin is warm and dry  Neurological:      General: No focal deficit present  Mental Status: He is alert and oriented to person, place, and time  Mental status is at baseline  Labs: I have personally reviewed pertinent lab results    Laboratory and Diagnostics  Results from last 7 days   Lab Units 06/13/22  0432 06/12/22  0354 06/12/22  0343   WBC Thousand/uL 7 25 10 33*  --    HEMOGLOBIN g/dL 11 2* 12 6  --    I STAT HEMOGLOBIN g/dl  --   --  14 3   HEMATOCRIT % 38 1 44 2  --    HEMATOCRIT, ISTAT %  --   --  42   PLATELETS Thousands/uL 162 195  --    NEUTROS PCT % 70 73  --    MONOS PCT % 10 9  --      Results from last 7 days   Lab Units 06/13/22  0432 06/12/22  1126 06/12/22  0354 06/12/22  0343   SODIUM mmol/L 142 139 138  --    POTASSIUM mmol/L 3 8 4 5 5 9*  --    CHLORIDE mmol/L 104 105 105  --    CO2 mmol/L 34* 37* 35*  --    CO2, I-STAT mmol/L  --   --   --  39*   ANION GAP mmol/L 4 -3* -2*  --    BUN mg/dL 16 18 21  --    CREATININE mg/dL 1 01 0 85 1 19  --    CALCIUM mg/dL 8 3 8 9 9 2  --    GLUCOSE RANDOM mg/dL 96 72 103  --    ALT U/L  --   --  41  --    AST U/L  --   --  72*  --    ALK PHOS U/L  --   --  78  --    ALBUMIN g/dL  --   --  3 3*  --    TOTAL BILIRUBIN mg/dL  --   --  0 45  --      Results from last 7 days   Lab Units 06/13/22 0432   MAGNESIUM mg/dL 2 2   PHOSPHORUS mg/dL 2 8      Results from last 7 days   Lab Units 06/12/22  1126 06/12/22  0354   INR   --  0 93   PTT seconds 25  --           Results from last 7 days   Lab Units 06/12/22  0730   LACTIC ACID mmol/L 1 2                 Results from last 7 days   Lab Units 06/12/22  1126   D-DIMER QUANTITATIVE ug/ml FEU 2 48*           ABG:   Results from last 7 days   Lab Units 06/13/22  0430   PH ART  7 384   PCO2 ART mm Hg 55 6*   PO2 ART mm Hg 84 4   HCO3 ART mmol/L 32 4*   BASE EXC ART mmol/L 6 0   ABG SOURCE  Line, Arterial       Imaging and other studies: I have personally reviewed pertinent reports  and I have personally reviewed pertinent films in PACS  CT c/a/p 6/12/2022: Limited evaluation due to extensive streak artifact particularly in the abdomen and pelvis and lack of contrast      CHEST     LUNGS:  Evaluation is limited due to respiratory motion  There are patchy and streaky opacities scattered throughout the lungs bilaterally  There is no tracheal or endobronchial lesion      PLEURA:  Unremarkable      HEART/GREAT VESSELS: Heart is unremarkable for patient's age  No thoracic aortic aneurysm      MEDIASTINUM AND BRYNN:  Unremarkable      CHEST WALL AND LOWER NECK:  Unremarkable  CTA chest PE 4/26/2022:    PULMONARY ARTERIAL TREE:  The present exam is technically limited for the detection of pulmonary emboli due to streak artifact, motion artifact, timing of contrast administration and body habitus  There is no evidence of large central pulmonary   embolism  Evaluation beyond the main pulmonary arteries is markedly limited      LUNGS:  Scattered areas of mild atelectasis  No focal consolidation  No pneumothorax  Small calcified granuloma posterior left lower lobe      PLEURA:  Unremarkable      HEART/GREAT VESSELS:  Unremarkable for patient's age  No thoracic aortic aneurysm      MEDIASTINUM AND BRYNN:  Unremarkable      CHEST WALL AND LOWER NECK:   Unremarkable      Pulmonary function testing: none    EKG, Pathology, and Other Studies: I have personally reviewed pertinent reports  PSG  IMPRESSION:     The patient slept poorly for a total of 223 minutes representing sleep efficiency of 75% sleep architecture showed reduced sleep latency to 1 minute, reduced stage N3 and stage R, increased sleep wake transitions  He had severely increased number of sleep disordered breathing events with average AHI of 117 6 events per hour,  majority of events were obstructive however mixed with some central apneas, his oxygen saturation remained below 90% for the entire study with an oxygen wayne of 51%  Thus, he meets the criteria for the diagnosis of severe obstructive sleep apnea associated with nocturnal hypoxemia and possible hypoventilation  In view of the patients severity, an in-lab PAP titration study with or without oxygen would be recommended, clinical correlation suggested        Code Status: Level 1 - Full Code    VTE Pharmacologic Prophylaxis: Enoxaparin (Lovenox)  VTE Mechanical Prophylaxis: sequential compression device    Disclaimer: Portions of the record may have been created with voice recognition software  Occasional wrong word or "sound a like" substitutions may have occurred due to the inherent limitations of voice recognition software  Careful consideration should be taken to recognize, using context, where substitutions have occurred      Jaime Pedro DO   Pulmonary/Critical Care Fellowship PGY-IV  [facility]

## 2022-06-13 NOTE — UTILIZATION REVIEW
Initial Clinical Review    Admission: Date/Time/Statement:   Admission Orders (From admission, onward)     Ordered        06/12/22 0516  Inpatient Admission  Once                      Orders Placed This Encounter   Procedures    Inpatient Admission     Standing Status:   Standing     Number of Occurrences:   1     Order Specific Question:   Level of Care     Answer:   Med Surg [16]     Order Specific Question:   Estimated length of stay     Answer:   More than 2 Midnights     Order Specific Question:   Certification     Answer:   I certify that inpatient services are medically necessary for this patient for a duration of greater than two midnights  See H&P and MD Progress Notes for additional information about the patient's course of treatment  ED Arrival Information     Expected   -    Arrival   6/12/2022 03:32    Acuity   Emergent            Means of arrival   Ambulance    Escorted by   [facility])    Service   Trauma    Admission type   [address] complaint   -           Chief Complaint   Patient presents with    Trauma     Pt brought in by EMS for fall  Pt was sleep walking and fell down 20 steps  Initial Presentation: 29 y o  male who presented by EMS as a Level B trauma to [address] [place] [address] ED  Inpatient admission for evaluation and treatment of hypercapnic respiratory failure, b/l nasal bone fractures  Presented s/p fall down 20 stairs while sleep walking  On exam, ill-appearing, epistaxis, tachycardic, tachypnea, b/l LE edema, anxious, GCS 15  CT showed b/l nasal bone fractures extending into nasal septum  Plan: NPO, IV ABX, supplemental O2, IVF, analgesics, PT/OT  OMFS, Neurosurgery consulted  Neurosurgery Consult: Pt w/ decline in mental status since arrival, GCS 11  Spring to be s/t hypercapnic respiratory failure  Repeat CT not concerning for raised ICP but pt requires close monitoring w/ frequent neuro checks  Avoid sedating meds  OMFS Consult: Pt w/ mildly displaced b/l nasal bone fracture  No acute surgical intervention  CPAP okay  Avoid trauma to nasal area  Saline drops to b/l nares TID  Date: 06/13/22   Day 2: On exam, ill-appearing, obese, dry mucous membranes, decreased breath sounds, abdominal distention, b/l 3+ edema  GCS 15  Plan: continue home meds, CPAP w/ sleep and naps, saline gtts to nares  q4h neuro checks  Pulmonology Consulted  Pulmonology Consult: Pt w/ JOSE  Pt had recent sleep study that showed AHI of 117 6 events per hour  Can have a nocturnal pulse ox study w/ morning ABG while admitted, otherwise can have an outpatient titration study for CPAP/BiPAP       ED Triage Vitals   Temperature Pulse Respirations Blood Pressure SpO2   06/12/22 0906 06/12/22 0210 06/12/22 0335 06/12/22 0210 06/12/22 0334   97 9 °F (36 6 °C) 100 (!) 0 144/62 98 %      Temp Source Heart Rate Source Patient Position - Orthostatic VS BP Location FiO2 (%)   06/12/22 0906 06/12/22 0338 06/12/22 0338 -- --   Rectal Monitor Lying        Pain Score       06/13/22 0800       No Pain          Wt Readings from Last 1 Encounters:   06/12/22 133 kg (294 lb)     Additional Vital Signs:   Date/Time Temp Pulse Resp BP MAP (mmHg) Arterial Line BP MAP SpO2 O2 Device   06/13/22 0900 98 24 °F (36 8 °C) 92 14 -- -- 150/78 98 mmHg 95 % --   06/13/22 0800 98 6 °F (37 °C) 88 22 -- -- 116/64 84 mmHg 94 % --   06/13/22 0746 -- -- -- -- -- -- -- 96 % Nasal cannula   06/13/22 0700 98 24 °F (36 8 °C) 78 20 -- -- 134/64 82 mmHg 97 % --   06/13/22 0500 98 24 °F (36 8 °C) 80 23 Abnormal  -- -- 132/66 84 mmHg 98 % --   06/13/22 0400 98 24 °F (36 8 °C) 86 19 -- -- 124/60 78 mmHg 98 % --   06/13/22 0300 98 6 °F (37 °C) 80 20 -- -- 126/60 76 mmHg 98 % --   06/13/22 0229 -- -- -- -- -- -- -- 100 % --   06/13/22 0200 98 24 °F (36 8 °C) 82 20 -- -- 134/62 80 mmHg 99 % --   06/13/22 0100 98 96 °F (37 2 °C) 88 21 -- -- 128/64 82 mmHg 95 % --   06/13/22 0000 98 6 °F (37 °C) 84 19 -- -- 126/60 78 mmHg 97 % --   06/12/22 2300 98 96 °F (37 2 °C) 94 20 -- -- 112/56 72 mmHg 95 % --   06/12/22 2200 98 96 °F (37 2 °C) 90 20 -- -- 134/66 84 mmHg 97 % --   06/12/22 2100 98 96 °F (37 2 °C) 100 20 -- -- 124/60 76 mmHg 96 % --   06/12/22 2000 99 32 °F (37 4 °C) 90 20 -- -- 134/64 82 mmHg 97 % --   06/12/22 1959 -- -- -- -- -- -- -- 97 % --   06/12/22 1900 99 32 °F (37 4 °C) 96 24 Abnormal  -- -- 128/64 80 mmHg 95 % --   06/12/22 1800 99 68 °F (37 6 °C) 100 19 102/38 Abnormal  56 120/56 72 mmHg 96 % --   06/12/22 1700 99 7 °F (37 6 °C) 98 19 -- 49 134/62 78 mmHg 98 % --   06/12/22 1600 99 3 °F (37 4 °C) 96 25 Abnormal  -- 53 128/52 70 mmHg 98 % --   06/12/22 1500 99 7 °F (37 6 °C) 108 Abnormal  21 105/43 Abnormal  59 162/70 92 mmHg 95 % --   06/12/22 1419 -- -- -- -- -- -- -- -- --   06/12/22 1410 -- 100 -- 144/62 -- -- -- -- --   06/12/22 1400 99 3 °F (37 4 °C) 94 25 Abnormal  106/51 68 144/56 80 mmHg 96 % --   06/12/22 1300 -- 100 -- -- 56 150/52 80 mmHg 95 % --   06/12/22 1200 99 °F (37 2 °C) 94 -- -- 71 154/64 90 mmHg 96 % --   06/12/22 1100 97 2 °F (36 2 °C) Abnormal  100 -- -- 46 160/78 98 mmHg 98 % --   06/12/22 1000 97 9 °F (36 6 °C) 96 -- -- 54 -- -- 98 % --   06/12/22 0932 97 52 °F (36 4 °C) 102 25 Abnormal  105/73 91 -- -- 98 % --   06/12/22 0906 97 9 °F (36 6 °C) 110 Abnormal  28 Abnormal  93/54 67 -- -- 99 % --   06/12/22 0800 -- 102 20 111/71 83 -- -- 99 % --   06/12/22 0742 -- -- -- -- -- -- -- 100 % --   06/12/22 0700 -- 102 20 138/63 94 -- -- 100 % --   06/12/22 0617 -- -- -- -- -- -- -- 100 % --   06/12/22 0530 -- 122 Abnormal  24 Abnormal  144/62 -- -- -- 97 % Non-rebreather mask   06/12/22 0520 -- 130 Abnormal  27 Abnormal  -- -- -- -- 97 % --   06/12/22 0515 -- 122 Abnormal  29 Abnormal  -- -- -- -- 91 % --   06/12/22 0510 -- 130 Abnormal  28 Abnormal  -- -- -- -- 74 % Abnormal  --   06/12/22 0505 -- 124 Abnormal  37 Abnormal  -- -- -- -- 76 % Abnormal  --   06/12/22 0500 -- 124 Abnormal  20 181/78 Abnormal  -- -- -- 91 % Non-rebreather mask   06/12/22 0455 -- 126 Abnormal  30 Abnormal  -- -- -- -- 88 % Abnormal  --   06/12/22 0450 -- 128 Abnormal  36 Abnormal  -- -- -- -- 95 % --   06/12/22 0445 -- 130 Abnormal  18 178/72 Abnormal  -- -- -- 97 % None (Room air)   06/12/22 0440 -- 124 Abnormal  30 Abnormal  -- -- -- -- 95 % --   06/12/22 0435 -- 120 Abnormal  26 Abnormal  -- -- -- -- 93 % --   06/12/22 0430 -- 124 Abnormal  25 Abnormal  -- -- -- -- 97 % --   06/12/22 0425 -- 118 Abnormal  29 Abnormal  -- -- -- -- 94 % --   06/12/22 0420 -- 116 Abnormal  23 Abnormal  -- -- -- -- 94 % --   06/12/22 0416 -- 116 Abnormal  26 Abnormal  -- -- -- -- 95 % --   06/12/22 0415 -- 114 Abnormal  18 164/70 -- -- -- 95 % None (Room air)   06/12/22 04:14:46 -- -- -- 166/76 -- -- -- -- --   06/12/22 0410 -- 115 Abnormal  18 144/63 -- -- -- 95 % None (Room air)   06/12/22 0405 -- 107 Abnormal  18 153/68 -- -- -- -- None (Room air)   06/12/22 0400 -- 109 Abnormal  18 150/67 -- -- -- 97 % None (Room air)   06/12/22 0355 -- 112 Abnormal  18 154/119 Abnormal  -- -- -- -- None (Room air)   06/12/22 0350 -- 112 Abnormal  18 164/80 -- -- -- -- None (Room air)   06/12/22 0343 -- 114 Abnormal  0 Abnormal  -- -- -- -- 97 % --   06/12/22 03:41:11 -- -- -- 145/60 -- -- -- -- --   06/12/22 0340 -- 114 Abnormal  20 149/69 -- -- -- 99 % Non-rebreather mask   06/12/22 03:38:49 -- 116 Abnormal  24 Abnormal  145/60 -- -- -- 98 % Nasal cannula    06/12/22 0335 -- 115 Abnormal  -- -- -- -- -- 98 % --       [place] Coma Scale  Date and Time Eye Opening Best Verbal Response Best Motor Response [place] Coma Scale Score   06/13/22 0800 4 5 6 15   06/13/22 0400 4 5 6 15   06/13/22 0000 4 5 6 15   06/12/22 2000 2 2 6 10   06/12/22 1600 2 2 6 10   06/12/22 1200 2 2 6 10   06/12/22 0906 2 2 6 10   06/12/22 0800 2 2 4 8   06/12/22 0700 2 2 4 8   06/12/22 0530 4 5 6 15   06/12/22 0500 4 5 6 15   06/12/22 0445 4 5 6 15   06/12/22 0415 4 5 6 15   06/12/22 0410 4 5 6 15   06/12/22 0405 4 5 6 15   06/12/22 0400 4 5 6 15   06/12/22 0355 4 5 6 15   06/12/22 0350 4 5 6 15   06/12/22 0340 4 5 6 15   06/12/22 0338 4 5 6 15       Pertinent Labs/Diagnostic Test Results:   6/12 - Echo    Left Ventricle: Left ventricular cavity size is normal  Wall thickness is mildly increased  The left ventricular ejection fraction is 70% by visual estimation  Systolic function is vigorous  Wall motion is normal  Diastolic function is normal       VAS lower limb venous duplex study, complete bilateral   Final Result by Moustapha Hager MD (75/14 7304)      XR chest portable   Final Result by Geovanna Fong MD (06/12 1443)      Linear atelectasis in both lower lobes  Workstation performed: [id number]         CT head wo contrast   Final Result by Jose Angel Buckley DO (06/12 0987)   1  Somewhat limited examination due to patient motion artifact  2   Punctate foci of increased density at the gray-white matter junction of the frontal lobes bilaterally  Findings are suspicious for small areas of evolving hemorrhagic contusion/shearing injury  3   Increased density in the subarachnoid space along the high parietal convexities bilaterally, suspicious for small amount of subarachnoid hemorrhage  4   Accentuation of the gray-white matter differentiation with diffuse sulcal effacement  Although the findings may be artifactual and related to the patient's age as well as motion artifact, elevated intracranial pressure not excluded  Recommend follow-up neurosurgical consultation and/or MRI of the brain with FLAIR weighted imaging to better evaluate for diffuse axonal injury/shearing injury                     I personally discussed this study with Shaji Mcdaniel on 6/12/2022 at 9:50 AM                Workstation performed: [id number]         TRAUMA - CT head wo contrast   Final Result by Brianne Alfred MD (06/12 9756)      No acute intracranial abnormality  Please see the separate report of concurrent facial CT for description of nasal fractures  Workstation performed: [id number]         TRAUMA - CT spine cervical wo contrast   Final Result by Philip Joseph MD (06/12 2923)      Limited evaluation due to motion artifact  Otherwise, no definite evidence of traumatic injury in the cervical spine  Workstation performed: [id number]         TRAUMA - CT chest abdomen pelvis w contrast   Final Result by Philip Joseph MD (06/12 0545)      1  Limited evaluation due to lack of contrast and streak artifact  2   Patchy and streaky opacities scattered throughout the lungs bilaterally  This is nonspecific and may reflect an infectious or inflammatory process including aspiration  3   Subcutaneous fat stranding throughout the lower ventral abdominal wall with overlying skin thickening  Correlate for soft tissue contusion in the setting of trauma  Other considerations include edema or cellulitic change  I personally discussed this study with Binfire  on 6/12/2022 at 5:06 AM                Workstation performed: [id number]         TRAUMA - CT facial bones wo contrast   Final Result by Philip Joseph MD (06/12 7282)      Comminuted and displaced bilateral nasal bone fractures involving the nasal septum and frontal process of the maxilla bilaterally        I personally discussed this study with Binfire  on 6/12/2022 at 5:06 AM                      Workstation performed: [id number]           Results from last 7 days   Lab Units 06/12/22  0603   SARS-COV-2  Negative     Results from last 7 days   Lab Units 06/13/22  0432 06/12/22  0354 06/12/22  0343   WBC Thousand/uL 7 25 10 33*  --    HEMOGLOBIN g/dL 11 2* 12 6  --    I STAT HEMOGLOBIN g/dl  --   --  14 3   HEMATOCRIT % 38 1 44 2  --    HEMATOCRIT, ISTAT %  --   --  42   PLATELETS Thousands/uL 162 195  --    NEUTROS ABS Thousands/µL 5 08 7 49  --          Results from last 7 days   Lab Units 06/13/22  0432 06/12/22  1126 06/12/22  0354 06/12/22  0343   SODIUM mmol/L 142 139 138  --    POTASSIUM mmol/L 3 8 4 5 5 9*  --    CHLORIDE mmol/L 104 105 105  --    CO2 mmol/L 34* 37* 35*  --    CO2, I-STAT mmol/L  --   --   --  39*   ANION GAP mmol/L 4 -3* -2*  --    BUN mg/dL 16 18 21  --    CREATININE mg/dL 1 01 0 85 1 19  --    EGFR ml/min/1 73sq m 95 112 78  --    CALCIUM mg/dL 8 3 8 9 9 2  --    CALCIUM, IONIZED, ISTAT mmol/L  --   --   --  1 16   MAGNESIUM mg/dL 2 2  --   --   --    PHOSPHORUS mg/dL 2 8  --   --   --      Results from last 7 days   Lab Units 06/12/22  1350 06/12/22  0354   AST U/L  --  72*   ALT U/L  --  41   ALK PHOS U/L  --  78   TOTAL PROTEIN g/dL  --  8 2   ALBUMIN g/dL  --  3 3*   TOTAL BILIRUBIN mg/dL  --  0 45   AMMONIA umol/L 24  --      Results from last 7 days   Lab Units 06/13/22  0603 06/13/22  0051   POC GLUCOSE mg/dl 96 92     Results from last 7 days   Lab Units 06/13/22  0432 06/12/22  1126 06/12/22  0354   GLUCOSE RANDOM mg/dL 96 72 103      Results from last 7 days   Lab Units 06/13/22  0430 06/12/22  2028 06/12/22  1627   PH ART  7 384 7 377 7 385   PCO2 ART mm Hg 55 6* 60 3* 59 3*   PO2 ART mm Hg 84 4 75 8 65 8*   HCO3 ART mmol/L 32 4* 34 6* 34 7*   BASE EXC ART mmol/L 6 0 7 7 7 9   O2 CONTENT ART mL/dL 16 0 15 6* 16 0   O2 HGB, ARTERIAL % 94 9 93 4* 92 5*   ABG SOURCE  Line, Arterial Line, Arterial Line, Arterial         Results from last 7 days   Lab Units 06/12/22  0343   PH, GERSON I-STAT  7 288*   PCO2, GERSON ISTAT mm HG 77 2*   PO2, GERSON ISTAT mm HG 44 0   HCO3, GERSON ISTAT mmol/L 36 9*   I STAT BASE EXC mmol/L 7*   I STAT O2 SAT % 72         Results from last 7 days   Lab Units 06/12/22  1627 06/12/22  1326 06/12/22  1126   HS TNI 0HR ng/L  --   --  11   HS TNI 2HR ng/L  --  10  --    HSTNI D2 ng/L  --  -1  --    HS TNI 4HR ng/L 9  --   --    HSTNI D4 ng/L -2  --   --      Results from last 7 days Lab Units 06/12/22  1126   D-DIMER QUANTITATIVE ug/ml FEU 2 48*     Results from last 7 days   Lab Units 06/12/22  1126 06/12/22  0354   PROTIME seconds  --  12 2   INR   --  0 93   PTT seconds 25  --              Results from last 7 days   Lab Units 06/12/22  0730   LACTIC ACID mmol/L 1 2             Results from last 7 days   Lab Units 06/12/22  1126   NT-PRO BNP pg/mL 61     Results from last 7 days   Lab Units 06/12/22  0603   INFLUENZA A PCR  Negative   INFLUENZA B PCR  Negative   RSV PCR  Negative         Results from last 7 days   Lab Units 06/12/22  0912   AMPH/METH  Negative   BARBITURATE UR  Negative   BENZODIAZEPINE UR  Negative   COCAINE UR  Negative   METHADONE URINE  Negative   OPIATE UR  Negative   PCP UR  Negative   THC UR  Negative     Results from last 7 days   Lab Units 06/12/22  1126 06/12/22  0730   BLOOD CULTURE  Received in Microbiology Lab  Culture in Progress  Received in Microbiology Lab  Culture in Progress  ED Treatment:   Medication Administration from 06/12/2022 0329 to 06/12/2022 0905       Date/Time Order Dose Route Action     06/12/2022 0421 iohexol (OMNIPAQUE) 350 MG/ML injection (SINGLE-DOSE) 100 mL 65 mL Intravenous Given     06/12/2022 0606 insulin regular (HumuLIN R,NovoLIN R) injection 10 Units 10 Units Intravenous Given     06/12/2022 0609 dextrose 50 % IV solution 25 mL 25 mL Intravenous Given     06/12/2022 0639 naloxone (NARCAN) 0 04 mg/mL syringe 0 04 mg 0 04 mg Intravenous Given     06/12/2022 0829 naloxone (NARCAN) injection 2 mg 2 mg Intravenous Given          Present on Admission:   Acute encephalopathy   Nasal bone fracture   Acute respiratory failure with hypoxia and hypercapnia (HCC)   Anasarca   Class 3 severe obesity due to excess calories in [facility])   Asthma      Admitting Diagnosis: Closed fracture of nasal bone, initial encounter [S02  2XXA]  Unspecified multiple injuries, initial encounter [T07  XXXA]  Age/Sex: 29 y o  male  Admission Orders:  Regular Diet  Continuous Pulse ox  Neuro checks q4h  BiPAP @ HS  SCDs  Scheduled Medications:  enoxaparin, 40 mg, Subcutaneous, Q12H JERICHO    Continuous IV Infusions:    multi-electrolyte, 75 mL/hr, Intravenous, Continuous    PRN Meds:  acetaminophen, 650 mg, Oral, Q6H PRN  furosemide, 40 mg, Intravenous; 6/13 x1  ondansetron, 4 mg, Intravenous, Q6H PRN        IP CONSULT TO ORAL AND MAXILLOFACIAL SURGERY  IP CONSULT TO CASE MANAGEMENT  IP CONSULT TO NEUROSURGERY  IP CONSULT TO PULMONOLOGY    Network Utilization Review Department  ATTENTION: Please call with any questions or concerns to [phone] and carefully listen to the prompts so that you are directed to the right person  All voicemails are confidential   [facility] [place] all requests for admission clinical reviews, approved or denied determinations and any other requests to dedicated fax number below belonging to the campus where the patient is receiving treatment   List of dedicated fax numbers for the Facilities:  [address] DENIALS (Administrative/Medical Necessity) [phone]   [address] (Maternity/NICU/Pediatrics) [phone]   [address]  [address] [facility] [place] [address] [address] [place] [address] [address] [facility] [place] [address] [phone]

## 2022-06-13 NOTE — PHYSICAL THERAPY NOTE
Physical Therapy Evaluation    Patient Name: Nae Rick    Today's Date: 6/13/2022     Problem List  Principal Problem:    Acute encephalopathy  Active Problems:    Nasal bone fracture    Acute respiratory failure with hypoxia and hypercapnia (HCC)    Anasarca    Class 3 severe obesity due to excess calories in adult [facility])    Asthma       Past Medical History  No past medical history on file  Past Surgical History  No past surgical history on file         06/13/22 0926   PT Last Visit   PT Visit Date 06/13/22   Note Type   Note type Evaluation   Pain Assessment   Pain Assessment Tool 0-10   Pain Score No Pain   Restrictions/Precautions   Weight Bearing Precautions Per Order No   Other Precautions Fall Risk;O2;Telemetry;Multiple lines; Bed Alarm; Chair Alarm  (6L NC)   Home Living   Type of [address] [place], [place] [address] Two level;Bed/bath upstairs   Additional Comments Pt reports he lives with his GF and 3 children (7, 8, 11yo) at [facility] with 10 GEMA and 15-20 steps inside  Pt reports bedroom and bathroom are on the 2nd floor  Pt was independent with ADLs/IADLs and mobility prior  Pt is alone at times when GF is working  Pt reports he was a  but stopped working  Pt has no DMEs  (+)    Prior Function   Level of [place] Independent with ADLs and functional mobility   Lives With Significant other  (GF)   Receives Help From Friend(s); Family   ADL Assistance Independent   IADLs Independent   Falls in the last 6 months (S)  >10  (falls multiple times per day)   Vocational Other (Comment)   General   Family/Caregiver Present Yes   Cognition   Overall Cognitive Status WFL   Arousal/Participation Cooperative   Orientation Level Oriented X4   Memory Within functional limits   Following Commands Follows one step commands without difficulty   RLE Assessment   RLE Assessment X   Strength RLE   R Hip Flexion 4+/5   R Knee Extension 4+/5   R Ankle Dorsiflexion 4+/5   Strength LLE   L Hip Flexion 4+/5   L Knee Extension 4+/5   L Ankle Dorsiflexion 4+/5   Light Touch   RLE Light Touch Grossly intact   LLE Light Touch Grossly intact   Bed Mobility   Supine to Sit 5  Supervision   Additional items HOB elevated; Increased time required;Verbal cues   Additional Comments Pt sat EOB at a S level   Transfers   Sit to Stand 5  Supervision   Additional items HOB elevated; Increased time required   Stand to Sit 5  Supervision   Ambulation/Elevation   Gait pattern Narrow HOLLEY; Short stride; Inconsistent ramandeep   Gait Assistance 5  Supervision   Additional items Verbal cues   Assistive Device None   Distance 3ft to chair   Ambulation/Elevation Additional Comments Pt required VCs for hand placement and safety   Balance   Static Sitting Fair +   Dynamic Sitting Fair   Static Standing Fair   Dynamic Standing Fair -   Ambulatory Fair -   Endurance Deficit   Endurance Deficit Yes   Activity Tolerance   Activity Tolerance Patient limited by fatigue   Medical Staff Made Aware OT   Nurse Made Aware RN cleared pt to mobilize   Assessment   Prognosis Fair   Problem List Decreased strength;Decreased cognition; Impaired balance;Decreased endurance;Decreased mobility; Decreased safety awareness; Impaired judgement   Assessment Pt is a 30 y/o male admitted to [address] [place] [address] for acute encephalopathy s/p fall downstairs  Pt's PMH, Dx, and personal factors that interferes with PT care: asthma, class 3 obesity, acute respiratory failure with hypoxia and hypercapnia, nasal bone fracture, frequent falls, and sleep walking  Pt reports he lives with GF and 3 children in a [facility] [address] with 10 [address]EMA and 15-20 steps inside  Pt's bedroom and bathroom are on 2nd floor  Pt was independent with ADLs and mobility prior  Pt is alone at times when GF is at work  Pt falls 2-3x daily    During the examination, pt demonstrated good strength of BLE and performed bed mobility, transfers, and ambulation at a S level, however, pt also demonstrated decrease activity tolerance, impaired endurance, and O2 dependent that limits him from performing his ADLs independently  Pt will benefit with skilled PT interventions to address impairments stated above  Pt is recommended to home with no additional rehab upon D/C  At the end of the session, pt was OOB in chair with call bell within reach and chair alarm on  Preliminary results of the duplex study was negative  Would like to see pt one more time for stairs management  Barriers to Discharge Inaccessible home environment;Decreased caregiver support   Goals   STG Expiration Date 06/27/22   Short Term Goal #1 Within 2 weeks: STG 1: Pt will ambulate 300ft independently to improve walking tolerance and endurance  STG 2: Pt will perform sit <-> stand independently to demonstrate improved transfers  STG 3: Pt will perform supine <-> sit independently to demonstrate improved bed mobility  STG 4: Pt will perform 20 steps independently after ambulating 50ft to demonstrate great stair management  PT Treatment Day 0   Plan   Treatment/Interventions Endurance training; Therapeutic exercise;Equipment eval/education;Patient/family training;OT;Functional transfer training;Elevations;Gait training   PT Frequency 1-2x/wk   Recommendation   PT Discharge Recommendation No rehabilitation needs   AM-PAC Basic Mobility Inpatient   Turning in Bed Without Bedrails 3   Lying on Back to Sitting on Edge of Flat Bed 3   Moving Bed to Chair 3   Standing Up From Chair 3   Walk in Room 3   Climb 3-5 Stairs 3   Basic Mobility Inpatient Raw Score 18   Basic Mobility Standardized Score 41 05   Highest Level Of Mobility   JH-HLM Goal 6: Walk 10 steps or more   JH-HLM Achieved 4: Move to chair/commode     CRISTIAN Moreira

## 2022-06-13 NOTE — ASSESSMENT & PLAN NOTE
· Significantly improved with non-invasive positive pressure and normalization of pH  · Appreciate neurosurgery evaluation  · Signed off, can consider TBI rehab if needed  · Avoid sedating medications  · Delirium precautions  · PT/OT

## 2022-06-13 NOTE — ASSESSMENT & PLAN NOTE
· Significant improvement on non-invasive positive pressure  · Transition to nasal cannula this AM  · Would recommend pulmonary consult inpatient given bipap settings needed to normalize pH  · Will need evaluation from case management for assistance with obtaining home bipap/cpap as patient and family report significant insurance hurdles in the outpatient setting  · Continue non-invasive positive pressure with sleep and naps

## 2022-06-13 NOTE — OCCUPATIONAL THERAPY NOTE
Occupational Therapy Evaluation      Massimo Kei    6/13/2022    Principal Problem:    Acute encephalopathy  Active Problems:    Nasal bone fracture    Acute respiratory failure with hypoxia and hypercapnia (HCC)    Anasarca    Class 3 severe obesity due to excess calories in adult [facility])    Asthma      No past medical history on file  No past surgical history on file      06/13/22 0911   OT Last Visit   OT Visit Date 06/13/22   Note Type   Note type Evaluation   Restrictions/Precautions   Weight Bearing Precautions Per Order No   Other Precautions Fall Risk;O2;Multiple lines; Chair Alarm; Bed Alarm   Pain Assessment   Pain Assessment Tool 0-10   Pain Score No Pain   Pain Location/Orientation   (c/o discomfort from garnett)   Home Living   Type of Home House  (10 GEMA up to porch)   Home Layout Two level;Bed/bath upstairs  (Full flight 20 steps)   Bathroom Shower/Tub Tub/shower unit   Bathroom Toilet Standard   Home Equipment   (no use of DME at baseline)   Additional Comments pt lives w sig   other and 3 kids, 7,6 and 8years old   Prior Function   Level of [place] Independent with ADLs and functional mobility   Lives With Significant other   Receives Help From Family;Friend(s)   ADL Assistance Independent   IADLs Independent   Falls in the last 6 months (S)  >10  (MULTIPLE FALLS DAILY)   Vocational Other (Comment)   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, 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   Lifestyle   Autonomy pt reports being independent w self care, mobility, home management   Reciprocal Relationships supportive famliy   Intrinsic Gratification watch football   Psychosocial   Psychosocial (WDL) WDL   Subjective   Subjective "I just fall asleep all the time"   ADL   Eating Assistance 7  Independent   Grooming Assistance 7  Independent   UB Bathing Assistance 7  Independent   LB Bathing Assistance 5  [address] [address] Minimal Assistance   LB Dressing Deficit Don/doff R sock; Don/doff L sock   Functional Assistance 4  Minimal Assistance   Bed Mobility   Supine to Sit 5  Supervision   Additional items HOB elevated; Increased time required   Transfers   Sit to Stand 5  Supervision   Stand to Sit 5  Supervision   Stand pivot 5  Supervision   Additional items Impulsive;Verbal cues   Functional Mobility   Functional Mobility 5  Supervision   Balance   Static Sitting Fair +   Dynamic Sitting Fair   Static Standing Fair   Dynamic Standing Fair -   Activity Tolerance   Activity Tolerance Patient tolerated treatment well   Medical Staff Made Aware PT Ev Ag, Student Rashel   Nurse Made Aware OK to see per RN   RUE Assessment   RUE Assessment WFL   LUE Assessment   LUE Assessment WFL   Hand Function   Gross Motor Coordination Functional   Fine Motor Coordination Functional   Sensation   Light Touch No apparent deficits   Vision - Complex Assessment   Tracking Able to track stimulus in all quads without difficulty   Acuity Able to read clock/calendar on wall without difficulty   Perception   Inattention/Neglect Appears intact   Cognition   Overall Cognitive Status [facility]   Arousal/Participation Alert; Cooperative   Attention Within functional limits   Orientation Level Oriented X4   Memory Within functional limits;Decreased recall of precautions   Following Commands Follows one step commands without difficulty   Assessment   Limitation Decreased ADL status; Decreased high-level ADLs; Decreased self-care trans   Assessment Pt is a 29 y o  male seen for OT evaluation s/p admit to [facility] on 6/12/2022 w/ Acute encephalopathy  Pt is s/p falling down a full flight of stairs  + nasal bone fracture  Pt reports falling due to sleep walking  PT  REPORTS 2-3 FALLS DAILY  Comorbidities affecting pt's functional performance at time of assessment include: obesity (BMI 44), asthma   Personal factors affecting pt at time of IE include:steps to enter environment and difficulty performing IADLS   Prior to admission, pt was living at home w family in 2 , bed/bath upstairs  He was independent w self care, mobility  Had to stop work/driving due to falling asleep all the time  Upon evaluation: Pt requires min assist LB dressing (c/o swelling LE"s, difficulty reaching), slightly off balance in stance, requiring S for transfers  Pt to benefit from continued skilled OT tx while in the hospital to address deficits as defined above and maximize level of functional independence w ADL's and functional mobility  Occupational Performance areas to address include: bathing/shower, dressing, functional mobility and clothing management  Based on findings from OT evaluation and functional performance deficits, pt has been identified as a  high complexity evaluation  The patient's raw score on the AM-PAC Daily Activity inpatient short form is 21, standardized score is 44 27, greater than 39 4  Patients at this level are likely to benefit from discharge to home  From OT standpoint, recommendation at time of d/c would be home w family support as patient is currently functioning close to his baseline  Goals   Patient Goals to not fall asleep all the time   Plan   Treatment Interventions ADL retraining;Functional transfer training; Endurance training;Patient/family training; Compensatory technique education; Energy conservation; Activityengagement   Goal Expiration Date 06/27/22   OT Frequency 3-5x/wk   Recommendation   OT Discharge Recommendation No rehabilitation needs   OT - OK to Discharge Yes   Additional Comments  home w family support from OT standpoint   Additional Comments 2 seen together for thearpy evaluation  due to medical complexity and high fall risk   -PAC Daily Activity Inpatient   Lower Body Dressing 3   Bathing 3   Toileting 3   Upper Body Dressing 4   Grooming 4   Eating 4   Daily Activity Raw Score 21   Daily Activity Standardized Score (Calc for Raw Score >=11) 44 27     OT GOALS TO BE ACHIEVED IN 14 DAYS:    Patient will complete bed mobility mod I  With good safety     Pt will demonstrate good balance sitting at EOB x 10 min for increased safety w self care and in preparation for increased indpendence    Pt will complete grooming independently with set up     Pt will complete UB bathing and dressing independently    Pt will complete LB bathing and dressing independently    Pt will complete toileting w mod I and good safety     Pt will complete functional transfers independently demonstrating good safety     Pt will tolerate standing at sinkside x 5 min w F+ balance for increased safety w hygiene    Pt will demonstrate good ECT/self pacing skills with all self care and functional mobility    Pt will tolerate 30 min of OT session for increased functional activity tolerance

## 2022-06-13 NOTE — ASSESSMENT & PLAN NOTE
· Significant improvement on non-invasive positive pressure  · Transition to nasal cannula this AM  · Pending pulmonary consultation  · Will need evaluation from case management for assistance with obtaining home bipap/cpap as patient and family report significant insurance hurdles in the outpatient setting  · Continue non-invasive positive pressure with sleep and naps

## 2022-06-13 NOTE — PROGRESS NOTES
[facility]  Progress Note - Jonatan Ballard 1985, 29 y o  male MRN: [id number]  Unit/Bed#: ICU 11 Encounter: [id number]  Primary Care Provider: No primary care provider on file  Date and time admitted to hospital: 6/12/2022  3:32 AM    * Acute encephalopathy  Assessment & Plan  Improved    42-year-old male status post fall downstairs who presented with acute encephalopathy yesterday GCS 11 but improved now to GCS15   Acute encephalopathy felt to be consistent with hypercapnia respiratory failure  Imaging:    CT head without 6/12/22:  No acute intracranial abnormality   CT head without 6/12/22:  Limited secondary to motion artifact  Punctate foci of increased density frontal lobes bilaterally with possible small SAH high parietal convexity bilaterally    Plan:    Continue to monitor neurological exam   STAT CT head if neurological decline including a drop of GCS 2 points within 1 hr   CT head results reviewed with patient and family   Medical management per primary team   Mobilize PT/OT    DVT PPX: SCDs  Currently on Lovenox  No neurosurgical intervention indicated at this juncture  Patient may follow-up as needed  Consider TBI rehab pending progress  Subjective/Objective   Chief Complaint: I'm okay    Subjective: Patient now awake and alert  No significiant events overnight  Endorses back pain but feels related to bed  Endorses eye pain now improved  No headache, vision or speech changes  No weakness or numbness  Objective: sitting up in chair   NAD    I/O       06/11 0701  06/12 0700 06/12 0701  06/13 0700 06/13 0701  06/14 0700    I V  (mL/kg)  425 (3 2) 78 8 (0 6)    IV Piggyback  200     Total Intake(mL/kg)  625 (4 7) 78 8 (0 6)    Urine (mL/kg/hr)  1875 (0 6)     Total Output  1875     Net  -1250 +78 8                 Invasive Devices  Report    Peripheral Intravenous Line  Duration           Peripheral IV 06/12/22 Right Hand 1 day Peripheral IV 06/12/22 Proximal;Right;Ventral (anterior) Forearm <1 day          Arterial Line  Duration           Arterial Line 06/12/22 Radial <1 day          Drain  Duration           Urethral Catheter Temperature probe 16 Fr  1 day                Physical Exam:  Vitals: Blood pressure (!) 102/38, pulse 92, temperature 98 24 °F (36 8 °C), resp  rate 14, height 5' 8" (1 727 m), weight 133 kg (294 lb), SpO2 95 %  ,Body mass index is 44 7 kg/m²      Hemodynamic Monitoring: MAP: Arterial Line MAP (mmHg): 98 mmHg    General appearance: alert, appears stated age, cooperative and no distress  Head: Normocephalic, without obvious abnormality  Eyes: EOMI, PERRL  Neck: supple, symmetrical, trachea midline  Lungs: non labored breathing  Heart: regular heart rate  Neurologic:   Mental status: Alert, oriented, thought content appropriate, slow but accurate calculations  Cranial nerves: grossly intact (Cranial nerves II-XII)  Sensory: normal to LT x4  Motor: moving all extremities without focal weakness, 5/5 x4  Reflexes: no clonus or lacy b/l   Coordination: finger to nose normal bilaterally, no drift bilaterally    Lab Results:  Results from last 7 days   Lab Units 06/13/22  0432 06/12/22  0354 06/12/22  0343   WBC Thousand/uL 7 25 10 33*  --    HEMOGLOBIN g/dL 11 2* 12 6  --    I STAT HEMOGLOBIN g/dl  --   --  14 3   HEMATOCRIT % 38 1 44 2  --    HEMATOCRIT, ISTAT %  --   --  42   PLATELETS Thousands/uL 162 195  --    NEUTROS PCT % 70 73  --    MONOS PCT % 10 9  --      Results from last 7 days   Lab Units 06/13/22  0432 06/12/22  1126 06/12/22  0354 06/12/22  0343   POTASSIUM mmol/L 3 8 4 5 5 9*  --    CHLORIDE mmol/L 104 105 105  --    CO2 mmol/L 34* 37* 35*  --    CO2, I-STAT mmol/L  --   --   --  39*   BUN mg/dL 16 18 21  --    CREATININE mg/dL 1 01 0 85 1 19  --    CALCIUM mg/dL 8 3 8 9 9 2  --    ALK PHOS U/L  --   --  78  --    ALT U/L  --   --  41  --    AST U/L  --   --  72*  --    GLUCOSE, ISTAT mg/dl  --   -- --  111     Results from last 7 days   Lab Units 06/13/22  0432   MAGNESIUM mg/dL 2 2     Results from last 7 days   Lab Units 06/13/22  0432   PHOSPHORUS mg/dL 2 8     Results from last 7 days   Lab Units 06/12/22  1126 06/12/22  0354   INR   --  0 93   PTT seconds 25  --      No results found for: TROPONINT  ABG:  Lab Results   Component Value Date    PHART 7 384 06/13/2022    YWJ5PEO 55 6 (HH) 06/13/2022    PO2ART 84 4 06/13/2022    PCD7KPX 32 4 (H) 06/13/2022    BEART 6 0 06/13/2022    SOURCE Line, Arterial 06/13/2022       Imaging Studies: I have personally reviewed pertinent reports  and I have personally reviewed pertinent films in PACS    XR chest portable    Result Date: 6/12/2022  Impression: Linear atelectasis in both lower lobes  Workstation performed: [id number]     CT head wo contrast    Result Date: 6/12/2022  Impression: 1  Somewhat limited examination due to patient motion artifact  2   Punctate foci of increased density at the gray-white matter junction of the frontal lobes bilaterally  Findings are suspicious for small areas of evolving hemorrhagic contusion/shearing injury  3   Increased density in the subarachnoid space along the high parietal convexities bilaterally, suspicious for small amount of subarachnoid hemorrhage  4   Accentuation of the gray-white matter differentiation with diffuse sulcal effacement  Although the findings may be artifactual and related to the patient's age as well as motion artifact, elevated intracranial pressure not excluded  Recommend follow-up neurosurgical consultation and/or MRI of the brain with FLAIR weighted imaging to better evaluate for diffuse axonal injury/shearing injury  I personally discussed this study with Jorge Alberto Goff on 6/12/2022 at 9:50 AM  Workstation performed: [id number]     TRAUMA - CT head wo contrast    Result Date: 6/12/2022  Impression: No acute intracranial abnormality   Please see the separate report of concurrent facial CT for description of nasal fractures  Workstation performed: [id number]     TRAUMA - CT facial bones wo contrast    Result Date: 6/12/2022  Impression: Comminuted and displaced bilateral nasal bone fractures involving the nasal septum and frontal process of the maxilla bilaterally  I personally discussed this study with Santa Sanez  on 6/12/2022 at 5:06 AM  Workstation performed: [id number]     TRAUMA - CT spine cervical wo contrast    Result Date: 6/12/2022  Impression: Limited evaluation due to motion artifact  Otherwise, no definite evidence of traumatic injury in the cervical spine  Workstation performed: [id number]     TRAUMA - CT chest abdomen pelvis w contrast    Result Date: 6/12/2022  Impression: 1  Limited evaluation due to lack of contrast and streak artifact  2   Patchy and streaky opacities scattered throughout the lungs bilaterally  This is nonspecific and may reflect an infectious or inflammatory process including aspiration  3   Subcutaneous fat stranding throughout the lower ventral abdominal wall with overlying skin thickening  Correlate for soft tissue contusion in the setting of trauma  Other considerations include edema or cellulitic change   I personally discussed this study with Santa Saenz  on 6/12/2022 at 5:06 AM  Workstation performed: [id number]       EKG, Pathology, and Other Studies: none    VTE Pharmacologic Prophylaxis: Enoxaparin (Lovenox)    VTE Mechanical Prophylaxis: sequential compression device

## 2022-06-13 NOTE — ASSESSMENT & PLAN NOTE
· Appreciate OMFS recommendations  · Will discuss discontinuation of Unasyn  · Saline drops to nose off bipap  · Follow-up outpatient one week after discharge

## 2022-06-13 NOTE — PROGRESS NOTES
[address] [facility]  Progress Note - Lashawn Levi 1985, 29 y o  male MRN: [id number]  Unit/Bed#: ICU 11 Encounter: [id number]  Primary Care Provider: No primary care provider on file  Date and time admitted to hospital: 6/12/2022  3:32 AM    Asthma  Assessment & Plan  · Respiratory protocol  · Will discuss with patient home medications    Class 3 severe obesity due to excess calories in adult [facility])  Assessment & Plan  · Encourage therapeutic lifestyle changes  · Consider follow-up with metabolic medicine or bariatric surgery    Anasarca  Assessment & Plan  · Can consider small dose of Lasix today  · Patient will likely need ECHO in outpatient setting due to poor study related to habitus/immobility  · Encourage out of bed as able    Acute respiratory failure with hypoxia and hypercapnia (HCC)  Assessment & Plan  · Significant improvement on non-invasive positive pressure  · Transition to nasal cannula this AM  · Would recommend pulmonary consult inpatient given bipap settings needed to normalize pH  · Will need evaluation from case management for assistance with obtaining home bipap/cpap as patient and family report significant insurance hurdles in the outpatient setting  · Continue non-invasive positive pressure with sleep and naps    Nasal bone fracture  Assessment & Plan  · Appreciate OMFS recommendations  · Will discuss discontinuation of Unasyn  · Saline drops to nose off bipap  · Follow-up outpatient one week after discharge    * Acute encephalopathy  Assessment & Plan  · Significantly improved with non-invasive positive pressure and normalization of pH  · Appreciate neurosurgery evaluation  · Avoid sedating medications  · Delirium precautions  · PT/OT      ----------------------------------------------------------------------------------------  HPI/24hr events: Patient is a 29year old male presenting on 6/12 following a fall at home while "sleep walking"   He has a past medical history of severe obstructive sleep apnea not on non-invasive positive pressure, obesity, and asthma  His traumatic work-up was notable for nasal bone fractures and was neurologically intact  He experienced a sudden decrease in consciousness in the emergency room and work-up was concerning for worsening hypercarbic respiratory failure  He was placed on bipap with serial gases that demonstrated improvement over the course of the day  Lower extremity duplex was ordered due to hypoxia and edema, which is preliminarily negative  Overnight, he returned to a GCS of 15 with improving oxygenation  Patient appropriate for transfer out of the ICU today?: Patient does not meet criteria for ICU Follow-up Clinic; referral has not been made  Disposition: Improving  Code Status: Level 1 - Full Code  ---------------------------------------------------------------------------------------  SUBJECTIVE  "I feel better"    Review of Systems   Constitutional: Positive for fatigue  HENT: Positive for sinus pressure and sore throat  Respiratory: Negative for cough and shortness of breath  Cardiovascular: Negative for chest pain  Gastrointestinal: Negative for abdominal pain, nausea and vomiting  Musculoskeletal: Negative for arthralgias  Neurological: Positive for weakness         ---------------------------------------------------------------------------------------  OBJECTIVE    Vitals   Vitals:    22 0229 22 0300 22 0400 22 0500   BP:       Pulse:  80 86 80   Resp:  20 19 (!) 23   Temp:  98 6 °F (37 °C) 98 24 °F (36 8 °C) 98 24 °F (36 8 °C)   TempSrc:       SpO2: 100% 98% 98% 98%   Weight:       Height:         Temp (24hrs), Av 7 °F (37 1 °C), Min:97 2 °F (36 2 °C), Max:99 7 °F (37 6 °C)  Current: Temperature: 98 24 °F (36 8 °C)  Arterial Line BP: 132/66  Arterial Line MAP (mmHg): 84 mmHg    Respiratory:  SpO2: SpO2: 98 %, SpO2 Activity:  , SpO2 Device: O2 Device: Non-rebreather mask       Invasive/non-invasive ventilation settings   Respiratory  Report   Lab Data (Last 4 hours)      06/13 0430            pH, Arterial       7 384             pCO2, Arterial       55 6             pO2, Arterial       84 4             HCO3, Arterial       32 4             Base Excess, Arterial       6 0                  O2/Vent Data           Most Recent        Non-Invasive Ventilation Mode   BiPAP                  Physical Exam  Constitutional:       Appearance: He is obese  He is ill-appearing  Interventions: Face mask in place  HENT:      Head: Normocephalic and atraumatic  Nose:      Comments: Dried blood in bilateral nares     Mouth/Throat:      Mouth: Mucous membranes are dry  Eyes:      Pupils: Pupils are equal, round, and reactive to light  Cardiovascular:      Rate and Rhythm: Normal rate and regular rhythm  Pulses: Normal pulses  Pulmonary:      Effort: Pulmonary effort is normal       Breath sounds: Decreased breath sounds present  Abdominal:      General: Bowel sounds are normal  There is distension  Palpations: Abdomen is soft  Tenderness: There is no abdominal tenderness  Genitourinary:     Comments: Wan in place with yellow urine  Musculoskeletal:         General: No tenderness  Right lower leg: Edema (3+) present  Left lower leg: Edema (3+) present  Skin:     General: Skin is warm and dry  Findings: Erythema (bilateral lower extremities) present  Neurological:      Mental Status: He is alert  GCS: GCS eye subscore is 3  GCS verbal subscore is 5  GCS motor subscore is 6               Laboratory and Diagnostics:  Results from last 7 days   Lab Units 06/13/22  0432 06/12/22  0354 06/12/22  0343   WBC Thousand/uL 7 25 10 33*  --    HEMOGLOBIN g/dL 11 2* 12 6  --    I STAT HEMOGLOBIN g/dl  --   --  14 3   HEMATOCRIT % 38 1 44 2  --    HEMATOCRIT, ISTAT %  --   --  42   PLATELETS Thousands/uL 162 195  --    NEUTROS PCT % 70 73  --    MONOS PCT % 10 9  --      Results from last 7 days   Lab Units 06/13/22  0432 06/12/22  1126 06/12/22  0354 06/12/22  0343   SODIUM mmol/L 142 139 138  --    POTASSIUM mmol/L 3 8 4 5 5 9*  --    CHLORIDE mmol/L 104 105 105  --    CO2 mmol/L 34* 37* 35*  --    CO2, I-STAT mmol/L  --   --   --  39*   ANION GAP mmol/L 4 -3* -2*  --    BUN mg/dL 16 18 21  --    CREATININE mg/dL 1 01 0 85 1 19  --    CALCIUM mg/dL 8 3 8 9 9 2  --    GLUCOSE RANDOM mg/dL 96 72 103  --    ALT U/L  --   --  41  --    AST U/L  --   --  72*  --    ALK PHOS U/L  --   --  78  --    ALBUMIN g/dL  --   --  3 3*  --    TOTAL BILIRUBIN mg/dL  --   --  0 45  --      Results from last 7 days   Lab Units 06/13/22  0432   MAGNESIUM mg/dL 2 2   PHOSPHORUS mg/dL 2 8      Results from last 7 days   Lab Units 06/12/22  1126 06/12/22  0354   INR   --  0 93   PTT seconds 25  --           Results from last 7 days   Lab Units 06/12/22  0730   LACTIC ACID mmol/L 1 2     ABG:  Results from last 7 days   Lab Units 06/13/22  0430   PH ART  7 384   PCO2 ART mm Hg 55 6*   PO2 ART mm Hg 84 4   HCO3 ART mmol/L 32 4*   BASE EXC ART mmol/L 6 0   ABG SOURCE  Line, Arterial     VBG:  Results from last 7 days   Lab Units 06/13/22  0430   ABG SOURCE  Line, Arterial           Micro  Results from last 7 days   Lab Units 06/12/22  1126 06/12/22  0730   BLOOD CULTURE  Received in Microbiology Lab  Culture in Progress  Received in Microbiology Lab  Culture in Progress  EKG: Review of telemetry demonstrates sinus rhythm  Imaging: I have personally reviewed pertinent reports     and I have personally reviewed pertinent films in PACS    Intake and Output  I/O       06/11 0701 06/12 0700 06/12 0701 06/13 0700    I V  (mL/kg)  425 (3 2)    IV Piggyback  200    Total Intake(mL/kg)  625 (4 7)    Urine (mL/kg/hr)  1875 (0 6)    Total Output  1875    Net  -1250                Height and Weights   Height: 5' 8" (172 7 cm)  IBW (Ideal Body Weight): 68 4 kg  Body mass index is 44 7 kg/m²  Weight (last 2 days)     Date/Time Weight    06/12/22 1410 133 (294)    06/12/22 03:38:49 134 (294 53)    06/12/22 0210 133 (294)            Nutrition       Diet Orders   (From admission, onward)             Start     Ordered    06/12/22 0520  Diet NPO  Diet effective now        References:    Nutrtion Support Algorithm Enteral vs  Parenteral   Question Answer Comment   Diet Type NPO    RD to adjust diet per protocol? Yes        06/12/22 0519                  Active Medications  Scheduled Meds:  Current Facility-Administered Medications   Medication Dose Route Frequency Provider Last Rate    acetaminophen  650 mg Oral Q6H PRN Bogdan Manriquez DO      ampicillin-sulbactam  3 g Intravenous Q6H Bogdan Manriquez DO Stopped (06/13/22 0552)    enoxaparin  40 mg Subcutaneous Q12H [address] Virgilio Arellano MD      ondansetron  4 mg Intravenous Q6H PRN Ashwini Forman DO       Continuous Infusions:     PRN Meds:   acetaminophen, 650 mg, Q6H PRN  ondansetron, 4 mg, Q6H PRN        Invasive Devices Review  Invasive Devices  Report    Peripheral Intravenous Line  Duration           Peripheral IV 06/12/22 Right Hand 1 day    Peripheral IV 06/12/22 Proximal;Right;Ventral (anterior) Forearm <1 day          Arterial Line  Duration           Arterial Line 06/12/22 Radial <1 day          Drain  Duration           Urethral Catheter Temperature probe 16 Fr  <1 day                Rationale for remaining devices: Discontinue garnett catheter and arterial line  ---------------------------------------------------------------------------------------  Advance Directive and Living Will:      Power of :    POLST:    ---------------------------------------------------------------------------------------  Care Time Delivered:   No Critical Care time spent       [facility]      Portions of the record may have been created with voice recognition software    Occasional wrong word or "sound a like" substitutions may have occurred due to the inherent limitations of voice recognition software    Read the chart carefully and recognize, using context, where substitutions have occurred

## 2022-06-13 NOTE — ASSESSMENT & PLAN NOTE
· Encourage therapeutic lifestyle changes  · Consider follow-up with metabolic medicine or bariatric surgery

## 2022-06-13 NOTE — PLAN OF CARE
Problem: MOBILITY - ADULT  Goal: Maintain or return to baseline ADL function  Description: INTERVENTIONS:  -  Assess patient's ability to carry out ADLs; assess patient's baseline for ADL function and identify physical deficits which impact ability to perform ADLs (bathing, care of mouth/teeth, toileting, grooming, dressing, etc )  - Assess/evaluate cause of self-care deficits   - Assess range of motion  - Assess patient's mobility; develop plan if impaired  - Assess patient's need for assistive devices and provide as appropriate  - Encourage maximum independence but intervene and supervise when necessary  - Involve family in performance of ADLs  - Assess for home care needs following discharge   - Consider OT consult to assist with ADL evaluation and planning for discharge  - Provide patient education as appropriate  Outcome: Progressing  Goal: Maintains/Returns to pre admission functional level  Description: INTERVENTIONS:  - Perform BMAT or MOVE assessment daily    - Set and communicate daily mobility goal to care team and patient/family/caregiver  - Collaborate with rehabilitation services on mobility goals if consulted  - Perform Range of Motion  times a day  - Reposition patient every  hours    - Dangle patient  times a day  - Stand patient  times a day  - Ambulate patient  times a day  - Out of bed to chair  times a day   - Out of bed for meals  times a day  - Out of bed for toileting  - Record patient progress and toleration of activity level   Outcome: Progressing     Problem: Prexisting or High Potential for Compromised Skin Integrity  Goal: Skin integrity is maintained or improved  Description: INTERVENTIONS:  - Identify patients at risk for skin breakdown  - Assess and monitor skin integrity  - Assess and monitor nutrition and hydration status  - Monitor labs   - Assess for incontinence   - Turn and reposition patient  - Assist with mobility/ambulation  - Relieve pressure over bony prominences  - Avoid friction and shearing  - Provide appropriate hygiene as needed including keeping skin clean and dry  - Evaluate need for skin moisturizer/barrier cream  - Collaborate with interdisciplinary team   - Patient/family teaching  - Consider wound care consult   Outcome: Progressing     Problem: Potential for Falls  Goal: Patient will remain free of falls  Description: INTERVENTIONS:  - Educate patient/family on patient safety including physical limitations  - Instruct patient to call for assistance with activity   - Consult OT/PT to assist with strengthening/mobility   - Keep Call bell within reach  - Keep bed low and locked with side rails adjusted as appropriate  - Keep care items and personal belongings within reach  - Initiate and maintain comfort rounds  - Make Fall Risk Sign visible to staff  - Offer Toileting every  Hours, in advance of need  - Initiate/Maintain alarm  - Obtain necessary fall risk management equipment:   - Apply yellow socks and bracelet for high fall risk patients  - Consider moving patient to room near nurses station  Outcome: Progressing     Problem: PAIN - ADULT  Goal: Verbalizes/displays adequate comfort level or baseline comfort level  Description: Interventions:  - Encourage patient to monitor pain and request assistance  - Assess pain using appropriate pain scale  - Administer analgesics based on type and severity of pain and evaluate response  - Implement non-pharmacological measures as appropriate and evaluate response  - Consider cultural and social influences on pain and pain management  - Notify physician/advanced practitioner if interventions unsuccessful or patient reports new pain  Outcome: Progressing     Problem: INFECTION - ADULT  Goal: Absence or prevention of progression during hospitalization  Description: INTERVENTIONS:  - Assess and monitor for signs and symptoms of infection  - Monitor lab/diagnostic results  - Monitor all insertion sites, i e  indwelling lines, tubes, and drains  - Monitor endotracheal if appropriate and nasal secretions for changes in amount and color  - [place] appropriate cooling/warming therapies per order  - Administer medications as ordered  - Instruct and encourage patient and family to use good hand hygiene technique  - Identify and instruct in appropriate isolation precautions for identified infection/condition  Outcome: Progressing  Goal: Absence of fever/infection during neutropenic period  Description: INTERVENTIONS:  - Monitor WBC    Outcome: Progressing     Problem: SAFETY ADULT  Goal: Maintain or return to baseline ADL function  Description: INTERVENTIONS:  -  Assess patient's ability to carry out ADLs; assess patient's baseline for ADL function and identify physical deficits which impact ability to perform ADLs (bathing, care of mouth/teeth, toileting, grooming, dressing, etc )  - Assess/evaluate cause of self-care deficits   - Assess range of motion  - Assess patient's mobility; develop plan if impaired  - Assess patient's need for assistive devices and provide as appropriate  - Encourage maximum independence but intervene and supervise when necessary  - Involve family in performance of ADLs  - Assess for home care needs following discharge   - Consider OT consult to assist with ADL evaluation and planning for discharge  - Provide patient education as appropriate  Outcome: Progressing  Goal: Maintains/Returns to pre admission functional level  Description: INTERVENTIONS:  - Perform BMAT or MOVE assessment daily    - Set and communicate daily mobility goal to care team and patient/family/caregiver  - Collaborate with rehabilitation services on mobility goals if consulted  - Perform Range of Motion  times a day  - Reposition patient every  hours    - Dangle patient  times a day  - Stand patient  times a day  - Ambulate patient  times a day  - Out of bed to chair  times a day   - Out of bed for meals  times a day  - Out of bed for toileting  - Record patient progress and toleration of activity level   Outcome: Progressing  Goal: Patient will remain free of falls  Description: INTERVENTIONS:  - Educate patient/family on patient safety including physical limitations  - Instruct patient to call for assistance with activity   - Consult OT/PT to assist with strengthening/mobility   - Keep Call bell within reach  - Keep bed low and locked with side rails adjusted as appropriate  - Keep care items and personal belongings within reach  - Initiate and maintain comfort rounds  - Make Fall Risk Sign visible to staff  - Offer Toileting every  Hours, in advance of need  - Initiate/Maintain alarm  - Obtain necessary fall risk management equipment:  - Apply yellow socks and bracelet for high fall risk patients  - Consider moving patient to room near nurses station  Outcome: Progressing     Problem: DISCHARGE PLANNING  Goal: Discharge to home or other facility with appropriate resources  Description: INTERVENTIONS:  - Identify barriers to discharge w/patient and caregiver  - Arrange for needed discharge resources and transportation as appropriate  - Identify discharge learning needs (meds, wound care, etc )  - Arrange for interpretive services to assist at discharge as needed  - Refer to Case Management Department for coordinating discharge planning if the patient needs post-hospital services based on physician/advanced practitioner order or complex needs related to functional status, cognitive ability, or social support system  Outcome: Progressing     Problem: Knowledge Deficit  Goal: Patient/family/caregiver demonstrates understanding of disease process, treatment plan, medications, and discharge instructions  Description: Complete learning assessment and assess knowledge base    Interventions:  - Provide teaching at level of understanding  - Provide teaching via preferred learning methods  Outcome: Progressing     Problem: Nutrition/Hydration-ADULT  Goal: Nutrient/Hydration intake appropriate for improving, restoring or maintaining nutritional needs  Description: Monitor and assess patient's nutrition/hydration status for malnutrition  Collaborate with interdisciplinary team and initiate plan and interventions as ordered  Monitor patient's weight and dietary intake as ordered or per policy  Utilize nutrition screening tool and intervene as necessary  Determine patient's food preferences and provide high-protein, high-caloric foods as appropriate       INTERVENTIONS:  - Monitor oral intake, urinary output, labs, and treatment plans  - Assess nutrition and hydration status and recommend course of action  - Evaluate amount of meals eaten  - Assist patient with eating if necessary   - Allow adequate time for meals  - Recommend/ encourage appropriate diets, oral nutritional supplements, and vitamin/mineral supplements  - Order, calculate, and assess calorie counts as needed  - Recommend, monitor, and adjust tube feedings and TPN/PPN based on assessed needs  - Assess need for intravenous fluids  - Provide specific nutrition/hydration education as appropriate  - Include patient/family/caregiver in decisions related to nutrition  Outcome: Progressing

## 2022-06-13 NOTE — ASSESSMENT & PLAN NOTE
· Significantly improved with non-invasive positive pressure and normalization of pH  · Appreciate neurosurgery evaluation  · Avoid sedating medications  · Delirium precautions  · PT/OT

## 2022-06-13 NOTE — PROGRESS NOTES
[address] [facility]  Progress Note - Spenser Jiménez 1985, 29 y o  male MRN: [id number]  Unit/Bed#: ICU 11 Encounter: [id number]  Primary Care Provider: No primary care provider on file     Date and time admitted to hospital: 6/12/2022  3:32 AM    Asthma  Assessment & Plan  · Respiratory protocol  · Will discuss with patient home medications    Class 3 severe obesity due to excess calories in adult [facility])  Assessment & Plan  · Encourage therapeutic lifestyle changes  · Consider follow-up with metabolic medicine or bariatric surgery    Anasarca  Assessment & Plan  · Consider PRN Lasix  · Patient will likely need ECHO in outpatient setting due to poor study related to habitus/immobility  · Encourage out of bed as able    Acute respiratory failure with hypoxia and hypercapnia (HCC)  Assessment & Plan  · Significant improvement on non-invasive positive pressure  · Transition to nasal cannula this AM  · Pending pulmonary consultation  · Will need evaluation from case management for assistance with obtaining home bipap/cpap as patient and family report significant insurance hurdles in the outpatient setting  · Continue non-invasive positive pressure with sleep and naps    Nasal bone fracture  Assessment & Plan  · Appreciate OMFS recommendations  · Unasyn discontinued  · Saline drops to nose off bipap  · Humidified oxygen  · Follow-up outpatient one week after discharge    * Acute encephalopathy  Assessment & Plan  · Significantly improved with non-invasive positive pressure and normalization of pH  · Appreciate neurosurgery evaluation  · Signed off, can consider TBI rehab if needed  · Avoid sedating medications  · Delirium precautions  · PT/OT      Code Status: Level 1 - Full Code  POA:    POLST:      Reason for ICU admission:   Acute encephalopathy    Active problems:   Principal Problem:    Acute encephalopathy  Active Problems:    Nasal bone fracture    Acute respiratory failure with hypoxia and hypercapnia (HCC)    Anasarca    Class 3 severe obesity due to excess calories in adult [facility])    Asthma  Resolved Problems:    * No resolved hospital problems  *      Consultants:   Pulmonary- pending, neurosurgery- signed off, OMFS- signed off    History of Present Illness:   Patient is a 29year old male presenting on 6/12 following a fall at home  He has a past medical history of severe obstructive sleep apnea not on non-invasive positive pressure, obesity, recent admission in April for hypercarbic respiratory failure, and asthma  His traumatic work-up was notable for only a nasal bone fracture which he will follow-up with OMFS on discharge  He became obtunded in the emergency room due to hypercarbic respiratory failure, was placed on bipap, and referred to the stepdown unit for management  Summary of clinical course:   He had gradual improvement in his pCO2 and mentation throughout the course of the day on bipap  His family reports difficulty with insurance in obtaining at home non-invasive positive pressure  He is back to neurologic baseline on 6/13  A pulmonary consult was placed and the patient is appropriate for transition to a medical/surgical floor        Recent or scheduled procedures:   6/12 ECHO- technically difficult study, ejection fraction 70% with mildly increased wall thickness  6/12 Lower extremity venous duplex- negative for DVT  6/12 CXR- linear atelectasis in both lobes  6/12 CT Head- limited examination due to motion, punctate foci of increased density at the gray-white matter junction of the frontal lobes bilaterally, increased density in the subarachnoid space along with the high parietal convexities bilaterally, accentuation of the gray-white matter differentiation with diffuse sulcal effacement  · Please note- this scan was discussed with neurosurgery, with patient back to neurologic baseline, findings likely motion-related  6/12 CT Cervical spine- limited evaluation due to motion artifact, no definite evidence of traumatic injury in the cervical spine  6/12 CT Chest/Abdomen/Pelvis- limited evaluation due to lack of contrast and streak artifact, patchy and streaky opacities scattered throughout the lungs bilaterally, subcutaneous fat stranding throughout the lower ventral abdominal wall with overlying skin thickening  6/12 CT Facial bones- comminuted and displaced bilateral nasal bone fractures involving the nasal septum and frontal process of the maxilla bilaterally     Outstanding/pending diagnostics:   Pulmonary evaluation    Cultures:   6/12 COVID- negative  6/12 Blood x2- pending       Mobilization Plan:   Out of bed with assistance    Nutrition Plan:   Oral diet    Invasive Devices Review  Invasive Devices  Report    Peripheral Intravenous Line  Duration           Peripheral IV 06/12/22 Right Hand 1 day    Peripheral IV 06/12/22 Proximal;Right;Ventral (anterior) Forearm <1 day          Arterial Line  Duration           Arterial Line 06/12/22 Radial <1 day          Drain  Duration           Urethral Catheter Temperature probe 16 Fr  1 day                Rationale for remaining devices: Arterial line and garnett to be discontinued    VTE Pharmacologic Prophylaxis: Enoxaparin (Lovenox)  VTE Mechanical Prophylaxis: sequential compression device    Discharge Plan:   Patient should be ready for discharge to home after pulmonary evaluation    Initial Physical Therapy Recommendations: Pending  Initial Occupational Therapy Recommendations: Home with family support  Initial /Plan: Pending    Home medications that are not reordered and reason why:   None    Spoke with Bianca Koch PA-C regarding transfer  Please contact critical care via [employer] with any questions or concerns  Portions of the record may have been created with voice recognition software    Occasional wrong word or "sound a like" substitutions may have occurred due to the inherent limitations of voice recognition software  Read the chart carefully and recognize, using context, where substitutions have occurred

## 2022-06-13 NOTE — PLAN OF CARE
Problem: PHYSICAL THERAPY ADULT  Goal: Performs mobility at highest level of function for planned discharge setting  See evaluation for individualized goals  Description:  Note: Prognosis: Fair  Problem List: Decreased strength, Decreased cognition, Impaired balance, Decreased endurance, Decreased mobility, Decreased safety awareness, Impaired judgement  Assessment: Pt is a 30 y/o male admitted to [address] [place] [address] for acute encephalopathy s/p fall downstairs  Pt's PMH, Dx, and personal factors that interferes with PT care: asthma, class 3 obesity, acute respiratory failure with hypoxia and hypercapnia, nasal bone fracture, frequent falls, and sleep walking  Pt reports he lives with GF and 3 children in [facility] with 10 GEMA and 15-20 steps inside  Pt's bedroom and bathroom are on 2nd floor  Pt was independent with ADLs and mobility prior  Pt is alone at times when GF is at work  Pt falls 2-3x daily  During the examination, pt demonstrated good strength of BLE and performed bed mobility, transfers, and ambulation at a S level, however, pt also demonstrated decrease activity tolerance, impaired endurance, and O2 dependent that limits him from performing his ADLs independently  Pt will benefit with skilled PT interventions to address impairments stated above  Pt is recommended to home with no additional rehab upon D/C  At the end of the session, pt was OOB in chair with call bell within reach and chair alarm on  Preliminary results of the duplex study was negative  Would like to see pt one more time for stairs management  Barriers to Discharge: Inaccessible home environment, Decreased caregiver support        PT Discharge Recommendation: No rehabilitation needs          See flowsheet documentation for full assessment

## 2022-06-13 NOTE — PLAN OF CARE
Problem: MOBILITY - ADULT  Goal: Maintain or return to baseline ADL function  Description: INTERVENTIONS:  -  Assess patient's ability to carry out ADLs; assess patient's baseline for ADL function and identify physical deficits which impact ability to perform ADLs (bathing, care of mouth/teeth, toileting, grooming, dressing, etc )  - Assess/evaluate cause of self-care deficits   - Assess range of motion  - Assess patient's mobility; develop plan if impaired  - Assess patient's need for assistive devices and provide as appropriate  - Encourage maximum independence but intervene and supervise when necessary  - Involve family in performance of ADLs  - Assess for home care needs following discharge   - Consider OT consult to assist with ADL evaluation and planning for discharge  - Provide patient education as appropriate  Outcome: Progressing  Goal: Maintains/Returns to pre admission functional level  Description: INTERVENTIONS:  - Perform BMAT or MOVE assessment daily    - Set and communicate daily mobility goal to care team and patient/family/caregiver  - Collaborate with rehabilitation services on mobility goals if consulted  - Perform Range of Motion 3 times a day  - Reposition patient every 2 hours    - Dangle patient 3 times a day  - Stand patient 3 times a day  - Ambulate patient 3 times a day  - Out of bed to chair 3 times a day   - Out of bed for meals 3 times a day  - Out of bed for toileting  - Record patient progress and toleration of activity level   Outcome: Progressing     Problem: Prexisting or High Potential for Compromised Skin Integrity  Goal: Skin integrity is maintained or improved  Description: INTERVENTIONS:  - Identify patients at risk for skin breakdown  - Assess and monitor skin integrity  - Assess and monitor nutrition and hydration status  - Monitor labs   - Assess for incontinence   - Turn and reposition patient  - Assist with mobility/ambulation  - Relieve pressure over bony prominences  - Avoid friction and shearing  - Provide appropriate hygiene as needed including keeping skin clean and dry  - Evaluate need for skin moisturizer/barrier cream  - Collaborate with interdisciplinary team   - Patient/family teaching  - Consider wound care consult   Outcome: Progressing     Problem: Potential for Falls  Goal: Patient will remain free of falls  Description: INTERVENTIONS:  - Educate patient/family on patient safety including physical limitations  - Instruct patient to call for assistance with activity   - Consult OT/PT to assist with strengthening/mobility   - Keep Call bell within reach  - Keep bed low and locked with side rails adjusted as appropriate  - Keep care items and personal belongings within reach  - Initiate and maintain comfort rounds  - Make Fall Risk Sign visible to staff  - Offer Toileting every 2 Hours, in advance of need  - Initiate/Maintain alarm  - Obtain necessary fall risk management equipment:   - Apply yellow socks and bracelet for high fall risk patients  - Consider moving patient to room near nurses station  Outcome: Progressing     Problem: PAIN - ADULT  Goal: Verbalizes/displays adequate comfort level or baseline comfort level  Description: Interventions:  - Encourage patient to monitor pain and request assistance  - Assess pain using appropriate pain scale  - Administer analgesics based on type and severity of pain and evaluate response  - Implement non-pharmacological measures as appropriate and evaluate response  - Consider cultural and social influences on pain and pain management  - Notify physician/advanced practitioner if interventions unsuccessful or patient reports new pain  Outcome: Progressing     Problem: INFECTION - ADULT  Goal: Absence or prevention of progression during hospitalization  Description: INTERVENTIONS:  - Assess and monitor for signs and symptoms of infection  - Monitor lab/diagnostic results  - Monitor all insertion sites, i e  indwelling lines, tubes, and drains  - Monitor endotracheal if appropriate and nasal secretions for changes in amount and color  - [place] appropriate cooling/warming therapies per order  - Administer medications as ordered  - Instruct and encourage patient and family to use good hand hygiene technique  - Identify and instruct in appropriate isolation precautions for identified infection/condition  Outcome: Progressing  Goal: Absence of fever/infection during neutropenic period  Description: INTERVENTIONS:  - Monitor WBC    Outcome: Progressing     Problem: SAFETY ADULT  Goal: Maintain or return to baseline ADL function  Description: INTERVENTIONS:  -  Assess patient's ability to carry out ADLs; assess patient's baseline for ADL function and identify physical deficits which impact ability to perform ADLs (bathing, care of mouth/teeth, toileting, grooming, dressing, etc )  - Assess/evaluate cause of self-care deficits   - Assess range of motion  - Assess patient's mobility; develop plan if impaired  - Assess patient's need for assistive devices and provide as appropriate  - Encourage maximum independence but intervene and supervise when necessary  - Involve family in performance of ADLs  - Assess for home care needs following discharge   - Consider OT consult to assist with ADL evaluation and planning for discharge  - Provide patient education as appropriate  Outcome: Progressing  Goal: Maintains/Returns to pre admission functional level  Description: INTERVENTIONS:  - Perform BMAT or MOVE assessment daily    - Set and communicate daily mobility goal to care team and patient/family/caregiver  - Collaborate with rehabilitation services on mobility goals if consulted  - Perform Range of Motion 3 times a day  - Reposition patient every 2 hours    - Dangle patient 3 times a day  - Stand patient 3 times a day  - Ambulate patient 3 times a day  - Out of bed to chair 3 times a day   - Out of bed for meals 3 times a day  - Out of bed for toileting  - Record patient progress and toleration of activity level   Outcome: Progressing  Goal: Patient will remain free of falls  Description: INTERVENTIONS:  - Educate patient/family on patient safety including physical limitations  - Instruct patient to call for assistance with activity   - Consult OT/PT to assist with strengthening/mobility   - Keep Call bell within reach  - Keep bed low and locked with side rails adjusted as appropriate  - Keep care items and personal belongings within reach  - Initiate and maintain comfort rounds  - Make Fall Risk Sign visible to staff  - Offer Toileting every 2 Hours, in advance of need  - Initiate/Maintain alarm  - Obtain necessary fall risk management equipment:   - Apply yellow socks and bracelet for high fall risk patients  - Consider moving patient to room near nurses station  Outcome: Progressing     Problem: DISCHARGE PLANNING  Goal: Discharge to home or other facility with appropriate resources  Description: INTERVENTIONS:  - Identify barriers to discharge w/patient and caregiver  - Arrange for needed discharge resources and transportation as appropriate  - Identify discharge learning needs (meds, wound care, etc )  - Arrange for interpretive services to assist at discharge as needed  - Refer to Case Management Department for coordinating discharge planning if the patient needs post-hospital services based on physician/advanced practitioner order or complex needs related to functional status, cognitive ability, or social support system  Outcome: Progressing     Problem: Knowledge Deficit  Goal: Patient/family/caregiver demonstrates understanding of disease process, treatment plan, medications, and discharge instructions  Description: Complete learning assessment and assess knowledge base    Interventions:  - Provide teaching at level of understanding  - Provide teaching via preferred learning methods  Outcome: Progressing     Problem: Nutrition/Hydration-ADULT  Goal: Nutrient/Hydration intake appropriate for improving, restoring or maintaining nutritional needs  Description: Monitor and assess patient's nutrition/hydration status for malnutrition  Collaborate with interdisciplinary team and initiate plan and interventions as ordered  Monitor patient's weight and dietary intake as ordered or per policy  Utilize nutrition screening tool and intervene as necessary  Determine patient's food preferences and provide high-protein, high-caloric foods as appropriate       INTERVENTIONS:  - Monitor oral intake, urinary output, labs, and treatment plans  - Assess nutrition and hydration status and recommend course of action  - Evaluate amount of meals eaten  - Assist patient with eating if necessary   - Allow adequate time for meals  - Recommend/ encourage appropriate diets, oral nutritional supplements, and vitamin/mineral supplements  - Order, calculate, and assess calorie counts as needed  - Recommend, monitor, and adjust tube feedings and TPN/PPN based on assessed needs  - Assess need for intravenous fluids  - Provide specific nutrition/hydration education as appropriate  - Include patient/family/caregiver in decisions related to nutrition  Outcome: Progressing

## 2022-06-13 NOTE — ASSESSMENT & PLAN NOTE
· Appreciate OMFS recommendations  · Unasyn discontinued  · Saline drops to nose off bipap  · Humidified oxygen  · Follow-up outpatient one week after discharge

## 2022-06-13 NOTE — PLAN OF CARE
Problem: OCCUPATIONAL THERAPY ADULT  Goal: Performs self-care activities at highest level of function for planned discharge setting  See evaluation for individualized goals  Note: Limitation: Decreased ADL status, Decreased high-level ADLs, Decreased self-care trans     Assessment: Pt is a 29 y o  male seen for OT evaluation s/p admit to [address] [place] [address] on 6/12/2022 w/ Acute encephalopathy  Pt is s/p falling down a full flight of stairs  + nasal bone fracture  Pt reports falling due to sleep walking  PT  REPORTS 2-3 FALLS DAILY  Comorbidities affecting pt's functional performance at time of assessment include: obesity (BMI 44), asthma  Personal factors affecting pt at time of IE include:steps to enter environment and difficulty performing IADLS   Prior to admission, pt was living at home w family in 2 SH, bed/bath upstairs  He was independent w self care, mobility  Had to stop work/driving due to falling asleep all the time  Upon evaluation: Pt requires min assist LB dressing (c/o swelling LE"s, difficulty reaching), slightly off balance in stance, requiring S for transfers  Pt to benefit from continued skilled OT tx while in the hospital to address deficits as defined above and maximize level of functional independence w ADL's and functional mobility  Occupational Performance areas to address include: bathing/shower, dressing, functional mobility and clothing management  Based on findings from OT evaluation and functional performance deficits, pt has been identified as a  high complexity evaluation  The patient's raw score on the AM-PAC Daily Activity inpatient short form is 21, standardized score is 44 27, greater than 39 4  Patients at this level are likely to benefit from discharge to home  From OT standpoint, recommendation at time of d/c would be home w family support as patient is currently functioning close to his baseline       OT Discharge Recommendation: No rehabilitation needs  OT - OK to Discharge:  Yes

## 2022-06-13 NOTE — ASSESSMENT & PLAN NOTE
· Can consider small dose of Lasix today  · Patient will likely need ECHO in outpatient setting due to poor study related to habitus/immobility  · Encourage out of bed as able

## 2022-06-13 NOTE — ASSESSMENT & PLAN NOTE
· Consider PRN Lasix  · Patient will likely need ECHO in outpatient setting due to poor study related to habitus/immobility  · Encourage out of bed as able

## 2022-06-13 NOTE — ASSESSMENT & PLAN NOTE
Improved    52-year-old male status post fall downstairs who presented with acute encephalopathy yesterday GCS 11 but improved now to GCS15   Acute encephalopathy felt to be consistent with hypercapnia respiratory failure  Imaging:    CT head without 6/12/22:  No acute intracranial abnormality   CT head without 6/12/22:  Limited secondary to motion artifact  Punctate foci of increased density frontal lobes bilaterally with possible small SAH high parietal convexity bilaterally    Plan:    Continue to monitor neurological exam   STAT CT head if neurological decline including a drop of GCS 2 points within 1 hr   CT head results reviewed with patient and family   Medical management per primary team   Mobilize PT/OT    DVT PPX: SCDs  Currently on Lovenox  No neurosurgical intervention indicated at this juncture  Patient may follow-up as needed  Consider TBI rehab pending progress

## 2022-06-14 LAB
ARTERIAL PATENCY WRIST A: YES
BASE EXCESS BLDA CALC-SCNC: 3.1 MMOL/L
HCO3 BLDA-SCNC: 28.9 MMOL/L (ref 22–28)
NASAL CANNULA: 2
O2 CT BLDA-SCNC: 16.8 ML/DL (ref 16–23)
OXYHGB MFR BLDA: 94.5 % (ref 94–97)
PCO2 BLDA: 48.9 MM HG (ref 36–44)
PH BLDA: 7.39 [PH] (ref 7.35–7.45)
PO2 BLDA: 81.4 MM HG (ref 75–129)
SPECIMEN SOURCE: ABNORMAL

## 2022-06-14 PROCEDURE — 94760 N-INVAS EAR/PLS OXIMETRY 1: CPT

## 2022-06-14 PROCEDURE — 99232 SBSQ HOSP IP/OBS MODERATE 35: CPT | Performed by: INTERNAL MEDICINE

## 2022-06-14 PROCEDURE — 36600 WITHDRAWAL OF ARTERIAL BLOOD: CPT

## 2022-06-14 PROCEDURE — 97116 GAIT TRAINING THERAPY: CPT

## 2022-06-14 PROCEDURE — 99232 SBSQ HOSP IP/OBS MODERATE 35: CPT | Performed by: SURGERY

## 2022-06-14 PROCEDURE — 82805 BLOOD GASES W/O2 SATURATION: CPT | Performed by: SURGERY

## 2022-06-14 PROCEDURE — 97530 THERAPEUTIC ACTIVITIES: CPT

## 2022-06-14 RX ADMIN — ENOXAPARIN SODIUM 40 MG: 40 INJECTION SUBCUTANEOUS at 21:40

## 2022-06-14 RX ADMIN — ENOXAPARIN SODIUM 40 MG: 40 INJECTION SUBCUTANEOUS at 08:49

## 2022-06-14 RX ADMIN — FLUTICASONE FUROATE AND VILANTEROL TRIFENATATE 1 PUFF: 100; 25 POWDER RESPIRATORY (INHALATION) at 08:49

## 2022-06-14 RX ADMIN — ALBUTEROL SULFATE 2 PUFF: 90 AEROSOL, METERED RESPIRATORY (INHALATION) at 19:39

## 2022-06-14 RX ADMIN — GLYCERIN 1 DROP: .002; .002; .01 SOLUTION/ DROPS OPHTHALMIC at 19:45

## 2022-06-14 NOTE — PROGRESS NOTES
[address] [facility]  Progress Note - Nya Guzmán 1985, 29 y o  male MRN: [id number]  Unit/Bed#: [facility] 901-99 Encounter: [id number]  Primary Care Provider: No primary care provider on file  Date and time admitted to hospital: 6/12/2022  3:32 AM    Asthma  Assessment & Plan  · Respiratory protocol  · Will discuss with patient home medications    Class 3 severe obesity due to excess calories in adult [facility])  Assessment & Plan  · Encourage therapeutic lifestyle changes  · Consider follow-up with metabolic medicine or bariatric surgery    Anasarca  Assessment & Plan  · Consider PRN Lasix  · Patient will likely need ECHO in outpatient setting due to poor study related to habitus/immobility  · Encourage out of bed as able    Acute respiratory failure with hypoxia and hypercapnia (HCC)  Assessment & Plan  · Significant improvement on non-invasive positive pressure  · Transition to nasal cannula this AM  · Pending pulmonary consultation  · Will need evaluation from case management for assistance with obtaining home bipap/cpap as patient and family report significant insurance hurdles in the outpatient setting  · BiPAP approved; will follow-up and likely discharged on 06/15  · Continue non-invasive positive pressure with sleep and naps    Nasal bone fracture  Assessment & Plan  · Appreciate OMFS recommendations  · Unasyn discontinued  · Saline drops to nose off bipap  · Humidified oxygen  · Follow-up outpatient one week after discharge    * Acute encephalopathy  Assessment & Plan  · Significantly improved with non-invasive positive pressure and normalization of pH  · Appreciate neurosurgery evaluation  · Signed off, can consider TBI rehab if needed  · Patient remains GCS 15 with no focal deficits  · Avoid sedating medications  · Delirium precautions  · PT/OT    DVT prophylaxis:  SCDs and Lovenox  PT and OT :  Eval and treat    Disposition:  DC on 06/15/2022 once BiPAP is approved    No other workup at this time  Family updated at bedside in regards to BiPAP  They were agreeable for discharge on 06/15  SUBJECTIVE:  Chief Complaint: "I am doing better now that I am getting BiPAP '    Subjective:  Patient reports that he is happy that the insurance will approve the BiPAP  He reports that he is feeling much better  He is denying any new pain  Reports no new shortness of breath  No new confusion/dizziness  OBJECTIVE:   Vitals:   Temp:  [98 °F (36 7 °C)] 98 °F (36 7 °C)  HR:  [] 102  Resp:  [18-22] 19  BP: (122-128)/(71-82) 125/82    Intake/Output:  I/O       06/12 0701  06/13 0700 06/13 0701  06/14 0700 06/14 0701  06/15 0700    P  O   480 120    I V  (mL/kg) 425 (3 2) 78 8 (0 6)     IV Piggyback 200      Total Intake(mL/kg) 625 (4 7) 558 8 (4 2) 120 (0 9)    Urine (mL/kg/hr) 1875 (0 6) 1000 (0 3)     Stool  0     Total Output 1875 1000     Net -1250 -441 3 +120           Unmeasured Urine Occurrence  1 x     Unmeasured Stool Occurrence  1 x          Nutrition: Diet Regular; Regular House    Physical Exam:   GENERAL APPEARANCE:  No acute distress  NEURO:  GCS 15  HEENT:  Normocephalic  CV:  Regular rate and rhythm  LUNGS:  CTA bilaterally  GI:  Nontender, nondistended  :  No Wan  MSK:  +2 pulses on extremities  SKIN:  Warm, dry, intact    Invasive Devices  Report    Peripheral Intravenous Line  Duration           Peripheral IV 06/12/22 Proximal;Right;Ventral (anterior) Forearm 2 days                      Lab Results: Results: I have personally reviewed all pertinent laboratory/tests results, BMP/CMP: No results found for: SODIUM, K, CL, CO2, ANIONGAP, BUN, CREATININE, GLUCOSE, CALCIUM, AST, ALT, ALKPHOS, PROT, BILITOT, EGFR and CBC: No results found for: WBC, HGB, HCT, MCV, PLT, ADJUSTEDWBC, MCH, MCHC, RDW, MPV, NRBC  Imaging/EKG Studies: I have personally reviewed pertinent reports       Other Studies:  No other studies

## 2022-06-14 NOTE — PHYSICAL THERAPY NOTE
Physical Therapy Progress Note     06/14/22 1440   Note Type   Note Type Treatment   Pain Assessment   Pain Score No Pain   Restrictions/Precautions   Other Precautions Pain; Fall Risk;O2;Cognitive   Subjective   Subjective Pt encountered supine in bed with family present  He is eager to participate in PT & offers no new complaints prior to session  Does demonstrate dyspnea with activity & complains of sensitivity to light when in solarium that had increased natural lighting compared to his room  Bed Mobility   Supine to Sit 5  Supervision   Additional items Assist x 1   Sit to Supine 5  Supervision   Additional items Assist x 1   Transfers   Sit to Stand 5  Supervision   Additional items Assist x 1   Stand to Sit 5  Supervision   Additional items Assist x 1   Ambulation/Elevation   Gait pattern Short stride;Decreased foot clearance; Improper Weight shift   Gait Assistance 5  Supervision   Additional items Assist x 1;Verbal cues  (instructions for pacing)   Assistive Device Rolling walker   Distance 300 ft total   Stair Management Assistance 5  Supervision   Additional items Assist x 1   Stair Management Technique Two rails; Foreward;Reciprocal   Number of Stairs 7   Balance   Static Sitting Fair +   Static Standing [address],Flo[address],4, & 5 -  (with use of RW)   Activity Tolerance   Activity Tolerance Patient tolerated treatment well;Patient limited by fatigue   Nurse Made Aware SHARON Cox   Assessment   Prognosis Fair   Problem List Decreased strength;Decreased cognition; Impaired balance;Decreased endurance;Decreased mobility; Decreased safety awareness; Impaired judgement   Assessment PT demonstrated significantly improved functional endurance compared to previous sesison, ambulating community distances & negotiating steps without need for seated rest   Did so on supplemental O2 with stable vitals despite observed dyspnea    Pt did ambulate with use of RW as noted above to reinforce safe mobitily practices, espeially focuing on pacing & awareness of fatigue  Pt was able to perform static & dynamic stnaing tasks at bedside without LOB or use of RW  Anticipate pt will be able to d/c home with family support  to safey progress to PLOF  PT will continue to follow at this time & trial ambulation with LRAD during hospital stay  Goals   Patient Goals to get better   STG Expiration Date 06/27/22   PT Treatment Day 1   Plan   Treatment/Interventions Functional transfer training;LE strengthening/ROM; Elevations; Therapeutic exercise; Endurance training;Patient/family training;Equipment eval/education;Gait training;Bed mobility   Progress Progressing toward goals   PT Frequency 1-2x/wk   Recommendation   PT Discharge Recommendation No rehabilitation needs     Odilon Garrison PTA    An [facility] Basic Mobility Standardized Score of 40 78 suggests pt would benefit from post acute rehab

## 2022-06-14 NOTE — CASE MANAGEMENT
Case Management Discharge Planning Note    Patient name Karina Rodriguez  Location [address] [place] [address] [address] 896-16 MRN [id number]  : 1985 Date 2022       Current Admission Date: 2022  Current Admission Diagnosis:Acute encephalopathy   Patient Active Problem List    Diagnosis Date Noted    Acute encephalopathy 2022    Nasal bone fracture 2022    Acute respiratory failure with hypoxia and hypercapnia ([address] ) 2022    Anasarca 2022    Class 3 severe obesity due to excess calories in adult [facility]) 2022    Asthma 2022      LOS (days): 2  Geometric Mean LOS (GMLOS) (days):   Days to GMLOS:     OBJECTIVE:  Risk of Unplanned Readmission Score: 8 85         Current admission status: Inpatient   Preferred Pharmacy:   [facility]  No address on file      Primary Care Provider: No primary care provider on file  Primary Insurance: SHANA SIMPSON  Secondary Insurance:     DISCHARGE DETAILS:    As per Pulm, pt's over night pulse ox study and AM ABG don't qualify the pt for BiPAP  Cm will continue to follow for other pulm/respiratory needs  Pt has no therapy needs at this time

## 2022-06-14 NOTE — PLAN OF CARE
Problem: MOBILITY - ADULT  Goal: Maintain or return to baseline ADL function  Description: INTERVENTIONS:  -  Assess patient's ability to carry out ADLs; assess patient's baseline for ADL function and identify physical deficits which impact ability to perform ADLs (bathing, care of mouth/teeth, toileting, grooming, dressing, etc )  - Assess/evaluate cause of self-care deficits   - Assess range of motion  - Assess patient's mobility; develop plan if impaired  - Assess patient's need for assistive devices and provide as appropriate  - Encourage maximum independence but intervene and supervise when necessary  - Involve family in performance of ADLs  - Assess for home care needs following discharge   - Consider OT consult to assist with ADL evaluation and planning for discharge  - Provide patient education as appropriate  Outcome: Progressing  Goal: Maintains/Returns to pre admission functional level  Description: INTERVENTIONS:  - Perform BMAT or MOVE assessment daily    - Set and communicate daily mobility goal to care team and patient/family/caregiver     - Collaborate with rehabilitation services on mobility goals if consulted  - Out of bed for toileting  - Record patient progress and toleration of activity level   Outcome: Progressing     Problem: Prexisting or High Potential for Compromised Skin Integrity  Goal: Skin integrity is maintained or improved  Description: INTERVENTIONS:  - Identify patients at risk for skin breakdown  - Assess and monitor skin integrity  - Assess and monitor nutrition and hydration status  - Monitor labs   - Assess for incontinence   - Turn and reposition patient  - Assist with mobility/ambulation  - Relieve pressure over bony prominences  - Avoid friction and shearing  - Provide appropriate hygiene as needed including keeping skin clean and dry  - Evaluate need for skin moisturizer/barrier cream  - Collaborate with interdisciplinary team   - Patient/family teaching  - Consider wound care consult   Outcome: Progressing     Problem: Potential for Falls  Goal: Patient will remain free of falls  Description: INTERVENTIONS:  - Educate patient/family on patient safety including physical limitations  - Instruct patient to call for assistance with activity   - Consult OT/PT to assist with strengthening/mobility   - Keep Call bell within reach  - Keep bed low and locked with side rails adjusted as appropriate  - Keep care items and personal belongings within reach  - Initiate and maintain comfort rounds  - Make Fall Risk Sign visible to staff  - Apply yellow socks and bracelet for high fall risk patients  - Consider moving patient to room near nurses station  Outcome: Progressing     Problem: PAIN - ADULT  Goal: Verbalizes/displays adequate comfort level or baseline comfort level  Description: Interventions:  - Encourage patient to monitor pain and request assistance  - Assess pain using appropriate pain scale  - Administer analgesics based on type and severity of pain and evaluate response  - Implement non-pharmacological measures as appropriate and evaluate response  - Consider cultural and social influences on pain and pain management  - Notify physician/advanced practitioner if interventions unsuccessful or patient reports new pain  Outcome: Progressing     Problem: INFECTION - ADULT  Goal: Absence or prevention of progression during hospitalization  Description: INTERVENTIONS:  - Assess and monitor for signs and symptoms of infection  - Monitor lab/diagnostic results  - Monitor all insertion sites, i e  indwelling lines, tubes, and drains  - Monitor endotracheal if appropriate and nasal secretions for changes in amount and color  - [place] appropriate cooling/warming therapies per order  - Administer medications as ordered  - Instruct and encourage patient and family to use good hand hygiene technique  - Identify and instruct in appropriate isolation precautions for identified infection/condition  Outcome: Progressing  Goal: Absence of fever/infection during neutropenic period  Description: INTERVENTIONS:  - Monitor WBC    Outcome: Progressing     Problem: SAFETY ADULT  Goal: Maintain or return to baseline ADL function  Description: INTERVENTIONS:  -  Assess patient's ability to carry out ADLs; assess patient's baseline for ADL function and identify physical deficits which impact ability to perform ADLs (bathing, care of mouth/teeth, toileting, grooming, dressing, etc )  - Assess/evaluate cause of self-care deficits   - Assess range of motion  - Assess patient's mobility; develop plan if impaired  - Assess patient's need for assistive devices and provide as appropriate  - Encourage maximum independence but intervene and supervise when necessary  - Involve family in performance of ADLs  - Assess for home care needs following discharge   - Consider OT consult to assist with ADL evaluation and planning for discharge  - Provide patient education as appropriate  Outcome: Progressing  Goal: Maintains/Returns to pre admission functional level  Description: INTERVENTIONS:  - Perform BMAT or MOVE assessment daily    - Set and communicate daily mobility goal to care team and patient/family/caregiver     - Collaborate with rehabilitation services on mobility goals if consulted  - Out of bed for toileting  - Record patient progress and toleration of activity level   Outcome: Progressing  Goal: Patient will remain free of falls  Description: INTERVENTIONS:  - Educate patient/family on patient safety including physical limitations  - Instruct patient to call for assistance with activity   - Consult OT/PT to assist with strengthening/mobility   - Keep Call bell within reach  - Keep bed low and locked with side rails adjusted as appropriate  - Keep care items and personal belongings within reach  - Initiate and maintain comfort rounds  - Make Fall Risk Sign visible to staff  - Apply yellow socks and bracelet for high fall risk patients  - Consider moving patient to room near nurses station  Outcome: Progressing     Problem: DISCHARGE PLANNING  Goal: Discharge to home or other facility with appropriate resources  Description: INTERVENTIONS:  - Identify barriers to discharge w/patient and caregiver  - Arrange for needed discharge resources and transportation as appropriate  - Identify discharge learning needs (meds, wound care, etc )  - Arrange for interpretive services to assist at discharge as needed  - Refer to Case Management Department for coordinating discharge planning if the patient needs post-hospital services based on physician/advanced practitioner order or complex needs related to functional status, cognitive ability, or social support system  Outcome: Progressing     Problem: Knowledge Deficit  Goal: Patient/family/caregiver demonstrates understanding of disease process, treatment plan, medications, and discharge instructions  Description: Complete learning assessment and assess knowledge base  Interventions:  - Provide teaching at level of understanding  - Provide teaching via preferred learning methods  Outcome: Progressing     Problem: Nutrition/Hydration-ADULT  Goal: Nutrient/Hydration intake appropriate for improving, restoring or maintaining nutritional needs  Description: Monitor and assess patient's nutrition/hydration status for malnutrition  Collaborate with interdisciplinary team and initiate plan and interventions as ordered  Monitor patient's weight and dietary intake as ordered or per policy  Utilize nutrition screening tool and intervene as necessary  Determine patient's food preferences and provide high-protein, high-caloric foods as appropriate       INTERVENTIONS:  - Monitor oral intake, urinary output, labs, and treatment plans  - Assess nutrition and hydration status and recommend course of action  - Evaluate amount of meals eaten  - Assist patient with eating if necessary   - Allow adequate time for meals  - Recommend/ encourage appropriate diets, oral nutritional supplements, and vitamin/mineral supplements  - Order, calculate, and assess calorie counts as needed  - Recommend, monitor, and adjust tube feedings and TPN/PPN based on assessed needs  - Assess need for intravenous fluids  - Provide specific nutrition/hydration education as appropriate  - Include patient/family/caregiver in decisions related to nutrition  Outcome: Progressing

## 2022-06-14 NOTE — PROGRESS NOTES
PULMONOLOGY PROGRESS NOTE     Name: Spenser Jiménez   Age & Sex: 29 y o  male   MRN: [id number]  Unit/Bed#: [facility] 615-01   Encounter: [id number]    PATIENT INFORMATION     Name: Spenser Jiménez   Age & Sex: 29 y o  male   MRN: [id number]  Hospital Stay Days: 2    ASSESSMENT/PLAN     Assessment:   1  Acute on chronic respiratory failure with hypoxia and hypercapnia  2  Severe JOSE/OHS with AHI of 117 6  3  Moderate asthma  4  Morbid obesity  5  Active tobacco use  6  Fall with comminuted bilateral nasal bone fractures  7  Acute metabolic encephalopathy - resolved    Plan:  · Previously diagnosed severe sleep apnea  · Was scheduled for in-lab CPAP titration study to see if he could be qualified for home BiPAP but due to insurance not being accepted he was not able to undergo the study  · AM ABG from overnight pulse ox study will not qualify him for home BiPAP  Will need to follow up with outpatient sleep study  · He states he only takes albuterol at home for control of his asthma  No PFTs on file  His significant other reports his asthma symptoms are more uncontrolled than previously thought  Started Breo 100/25  Would need to be discharged on ICS/LABA therapy  Can follow up with pulmonary as an outpatient  · He is actively smoking cigarettes, recommended complete tobacco cessation  · Supplement O2 to maintain a SpO2 of >88%  May need ambulatory pulse oximetery evaluation prior to DC to determine home O2 needs  · Provided incentive spirometer to help improved resting SpO2  Advance activity as tolerated  · All other care per primary team        SUBJECTIVE     Patient seen and examined  No acute events overnight  Frustrated regarding inability to prescribe home bipap       OBJECTIVE     Vitals:    06/14/22 0100 06/14/22 0159 06/14/22 0253 06/14/22 0630   BP:    122/76   BP Location:       Pulse: 94 88 76 95   Resp: 20 20 18 19   Temp:    98 °F (36 7 °C)   TempSrc:       SpO2: (!) 88% 92% (!) 88% 92%   Weight: Height:          Temperature:   Temp (24hrs), Av 2 °F (36 8 °C), Min:98 °F (36 7 °C), Max:98 6 °F (37 °C)    Temperature: 98 °F (36 7 °C)  Intake & Output:  I/O        0701  06/13 0700 06/ 0701  06/14 0700 06/14 0701  06/15 0700    P  O   480     I V  (mL/kg) 425 (3 2) 78 8 (0 6)     IV Piggyback 200      Total Intake(mL/kg) 625 (4 7) 558 8 (4 2)     Urine (mL/kg/hr) 1875 (0 6) 1000 (0 3)     Stool  0     Total Output 1875 1000     Net -1250 -441  3            Unmeasured Urine Occurrence  1 x     Unmeasured Stool Occurrence  1 x         Weights:   IBW (Ideal Body Weight): 68 4 kg    Body mass index is 44 7 kg/m²  Weight (last 2 days)     Date/Time Weight    22 1410 133 (294)    22 03:38:49 134 (294 53)    22 0210 133 (294)        Physical Exam  Vitals and nursing note reviewed  Constitutional:       General: He is not in acute distress  Appearance: Normal appearance  He is well-developed  He is obese  He is not ill-appearing or toxic-appearing  Interventions: He is not intubated  HENT:      Head: Normocephalic and atraumatic  Right Ear: External ear normal       Left Ear: External ear normal       Nose: Nose normal       Mouth/Throat:      Mouth: Mucous membranes are moist       Pharynx: Oropharynx is clear  Eyes:      General: No scleral icterus  Conjunctiva/sclera: Conjunctivae normal    Cardiovascular:      Rate and Rhythm: Normal rate and regular rhythm  Pulses: Normal pulses  Heart sounds: Normal heart sounds  No murmur heard  No friction rub  No gallop  Pulmonary:      Effort: Pulmonary effort is normal  No tachypnea, accessory muscle usage or respiratory distress  He is not intubated  Breath sounds: Normal breath sounds and air entry  No decreased air movement  No decreased breath sounds, wheezing, rhonchi or rales  Abdominal:      General: Abdomen is flat  Bowel sounds are normal       Palpations: Abdomen is soft     Musculoskeletal: General: No swelling or tenderness  Cervical back: Neck supple  No tenderness  Right lower leg: No edema  Left lower leg: No edema  Skin:     General: Skin is warm and dry  Neurological:      General: No focal deficit present  Mental Status: He is alert and oriented to person, place, and time  Mental status is at baseline  LABORATORY DATA     Labs: I have personally reviewed pertinent reports  Results from last 7 days   Lab Units 06/13/22  0432 06/12/22  0354 06/12/22  0343   WBC Thousand/uL 7 25 10 33*  --    HEMOGLOBIN g/dL 11 2* 12 6  --    I STAT HEMOGLOBIN g/dl  --   --  14 3   HEMATOCRIT % 38 1 44 2  --    HEMATOCRIT, ISTAT %  --   --  42   PLATELETS Thousands/uL 162 195  --    NEUTROS PCT % 70 73  --    MONOS PCT % 10 9  --       Results from last 7 days   Lab Units 06/13/22  0432 06/12/22  1126 06/12/22  0354 06/12/22  0343   POTASSIUM mmol/L 3 8 4 5 5 9*  --    CHLORIDE mmol/L 104 105 105  --    CO2 mmol/L 34* 37* 35*  --    CO2, I-STAT mmol/L  --   --   --  39*   BUN mg/dL 16 18 21  --    CREATININE mg/dL 1 01 0 85 1 19  --    CALCIUM mg/dL 8 3 8 9 9 2  --    ALK PHOS U/L  --   --  78  --    ALT U/L  --   --  41  --    AST U/L  --   --  72*  --    GLUCOSE, ISTAT mg/dl  --   --   --  111     Results from last 7 days   Lab Units 06/13/22  0432   MAGNESIUM mg/dL 2 2     Results from last 7 days   Lab Units 06/13/22  0432   PHOSPHORUS mg/dL 2 8      Results from last 7 days   Lab Units 06/12/22  1126 06/12/22  0354   INR   --  0 93   PTT seconds 25  --      Results from last 7 days   Lab Units 06/12/22  0730   LACTIC ACID mmol/L 1 2             ABG:   Results from last 7 days   Lab Units 06/14/22  0555   PH ART  7 389   PCO2 ART mm Hg 48 9*   PO2 ART mm Hg 81 4   HCO3 ART mmol/L 28 9*   BASE EXC ART mmol/L 3 1   ABG SOURCE  Radial, Left       IMAGING & DIAGNOSTIC TESTING     Radiology Results: I have personally reviewed pertinent reports    XR chest portable    Result Date: 6/12/2022  Impression: Linear atelectasis in both lower lobes  Workstation performed: [id number]     CT head wo contrast    Result Date: 6/12/2022  Impression: 1  Somewhat limited examination due to patient motion artifact  2   Punctate foci of increased density at the gray-white matter junction of the frontal lobes bilaterally  Findings are suspicious for small areas of evolving hemorrhagic contusion/shearing injury  3   Increased density in the subarachnoid space along the high parietal convexities bilaterally, suspicious for small amount of subarachnoid hemorrhage  4   Accentuation of the gray-white matter differentiation with diffuse sulcal effacement  Although the findings may be artifactual and related to the patient's age as well as motion artifact, elevated intracranial pressure not excluded  Recommend follow-up neurosurgical consultation and/or MRI of the brain with FLAIR weighted imaging to better evaluate for diffuse axonal injury/shearing injury  I personally discussed this study with Sneha Crooks on 6/12/2022 at 9:50 AM  Workstation performed: [id number]     TRAUMA - CT head wo contrast    Result Date: 6/12/2022  Impression: No acute intracranial abnormality  Please see the separate report of concurrent facial CT for description of nasal fractures  Workstation performed: [id number]     TRAUMA - CT facial bones wo contrast    Result Date: 6/12/2022  Impression: Comminuted and displaced bilateral nasal bone fractures involving the nasal septum and frontal process of the maxilla bilaterally  I personally discussed this study with May Main  on 6/12/2022 at 5:06 AM  Workstation performed: [id number]     TRAUMA - CT spine cervical wo contrast    Result Date: 6/12/2022  Impression: Limited evaluation due to motion artifact  Otherwise, no definite evidence of traumatic injury in the cervical spine    Workstation performed: [id number]     TRAUMA - CT chest abdomen pelvis w contrast    Result Date: 6/12/2022  Impression: 1  Limited evaluation due to lack of contrast and streak artifact  2   Patchy and streaky opacities scattered throughout the lungs bilaterally  This is nonspecific and may reflect an infectious or inflammatory process including aspiration  3   Subcutaneous fat stranding throughout the lower ventral abdominal wall with overlying skin thickening  Correlate for soft tissue contusion in the setting of trauma  Other considerations include edema or cellulitic change  I personally discussed this study with Ming Ortiz  on 6/12/2022 at 5:06 AM  Workstation performed: [id number]     Other Diagnostic Testing: I have personally reviewed pertinent reports  ACTIVE MEDICATIONS     Current Facility-Administered Medications   Medication Dose Route Frequency    acetaminophen (TYLENOL) tablet 650 mg  650 mg Oral Q6H PRN    albuterol (PROVENTIL HFA,VENTOLIN HFA) inhaler 2 puff  2 puff Inhalation Q4H PRN    enoxaparin (LOVENOX) subcutaneous injection 40 mg  40 mg Subcutaneous Q12H [facility]    fluticasone-vilanterol (BREO ELLIPTA) 100-25 mcg/inh inhaler 1 puff  1 puff Inhalation Daily    ondansetron (ZOFRAN) injection 4 mg  4 mg Intravenous Q6H PRN       VTE Pharmacologic Prophylaxis: Enoxaparin (Lovenox)  VTE Mechanical Prophylaxis: sequential compression device      Disclaimer: Portions of the record may have been created with voice recognition software  Occasional wrong word or "sound a like" substitutions may have occurred due to the inherent limitations of voice recognition software  Careful consideration should be taken to recognize, using context, where substitutions have occurred      Sidra Gordillo DO   Pulmonary and Critical Care Fellow, PGY-IV  [facility]

## 2022-06-14 NOTE — CASE MANAGEMENT
Case Management Discharge Planning Note    Patient name Neelima Rivers  Location [address] 615/PPHP 668-11 MRN [id number]  : 1985 Date 2022       Current Admission Date: 2022  Current Admission Diagnosis:Acute encephalopathy   Patient Active Problem List    Diagnosis Date Noted    Acute encephalopathy 2022    Nasal bone fracture 2022    Acute respiratory failure with hypoxia and hypercapnia ([address] ) 2022    Anasarca 2022    Class 3 severe obesity due to excess calories in adult [facility]) 2022    Asthma 2022      LOS (days): 2  Geometric Mean LOS (GMLOS) (days):   Days to GMLOS:     OBJECTIVE:  Risk of Unplanned Readmission Score: 8 98         Current admission status: Inpatient   Preferred Pharmacy:   [facility]  No address on file      Primary Care Provider: No primary care provider on file  Primary Insurance: SHANA SIMPSON  Secondary Insurance:     DISCHARGE DETAILS:    Requested  [email or web address]         Is the patient interested in [facility] at discharge?: No    DME Referral Provided  Referral made for DME?: Yes  DME referral completed for the following items[de-identified] BiPAP  DME Supplier Name[de-identified] [employer]      Pt was found to QUALIFY for home BiPAP  CM placed order through [address] [place] [address] and left voicemail for Jory Peter [phone] of [facility] to discuss  Plan for d/c tomorrow

## 2022-06-14 NOTE — PLAN OF CARE
Problem: PHYSICAL THERAPY ADULT  Goal: Performs mobility at highest level of function for planned discharge setting  See evaluation for individualized goals  Description: Treatment/Interventions: Functional transfer training, LE strengthening/ROM, Elevations, Therapeutic exercise, Endurance training, Patient/family training, Equipment eval/education, Gait training, Bed mobility          See flowsheet documentation for full assessment, interventions and recommendations  Outcome: Progressing  Note: Prognosis: Fair  Problem List: Decreased strength, Decreased cognition, Impaired balance, Decreased endurance, Decreased mobility, Decreased safety awareness, Impaired judgement  Assessment: PT demonstrated significantly improved functional endurance compared to previous sesison, ambulating community distances & negotiating steps without need for seated rest   Did so on supplemental O2 with stable vitals despite observed dyspnea  Pt did ambulate with use of RW as noted above to reinforce safe mobitily practices, espeially focuing on pacing & awareness of fatigue  Pt was able to perform static & dynamic stnaing tasks at bedside without LOB or use of RW  Anticipate pt will be able to d/c home with family support  to safey progress to PLOF  PT will continue to follow at this time & trial ambulation with LRAD during hospital stay  Barriers to Discharge: Inaccessible home environment, Decreased caregiver support        PT Discharge Recommendation: No rehabilitation needs          See flowsheet documentation for full assessment

## 2022-06-14 NOTE — ASSESSMENT & PLAN NOTE
· Significantly improved with non-invasive positive pressure and normalization of pH  · Appreciate neurosurgery evaluation  · Signed off, can consider TBI rehab if needed  · Patient remains GCS 15 with no focal deficits  · Avoid sedating medications  · Delirium precautions  · PT/OT

## 2022-06-14 NOTE — ASSESSMENT & PLAN NOTE
· Significant improvement on non-invasive positive pressure  · Transition to nasal cannula this AM  · Pending pulmonary consultation  · Will need evaluation from case management for assistance with obtaining home bipap/cpap as patient and family report significant insurance hurdles in the outpatient setting  · BiPAP approved; will follow-up and likely discharged on 06/15  · Continue non-invasive positive pressure with sleep and naps

## 2022-06-15 VITALS
HEART RATE: 83 BPM | SYSTOLIC BLOOD PRESSURE: 140 MMHG | DIASTOLIC BLOOD PRESSURE: 90 MMHG | WEIGHT: 294 LBS | HEIGHT: 68 IN | OXYGEN SATURATION: 100 % | BODY MASS INDEX: 44.56 KG/M2 | TEMPERATURE: 97.9 F | RESPIRATION RATE: 18 BRPM

## 2022-06-15 LAB
ANION GAP SERPL CALCULATED.3IONS-SCNC: 0 MMOL/L (ref 4–13)
BASOPHILS # BLD AUTO: 0.02 THOUSANDS/ΜL (ref 0–0.1)
BASOPHILS NFR BLD AUTO: 0 % (ref 0–1)
BUN SERPL-MCNC: 13 MG/DL (ref 5–25)
CALCIUM SERPL-MCNC: 8.7 MG/DL (ref 8.3–10.1)
CHLORIDE SERPL-SCNC: 108 MMOL/L (ref 100–108)
CO2 SERPL-SCNC: 33 MMOL/L (ref 21–32)
CREAT SERPL-MCNC: 1.11 MG/DL (ref 0.6–1.3)
DME PARACHUTE DELIVERY DATE ACTUAL: NORMAL
DME PARACHUTE DELIVERY DATE EXPECTED: NORMAL
DME PARACHUTE DELIVERY DATE REQUESTED: NORMAL
DME PARACHUTE ITEM DESCRIPTION: NORMAL
DME PARACHUTE ORDER STATUS: NORMAL
DME PARACHUTE SUPPLIER NAME: NORMAL
DME PARACHUTE SUPPLIER PHONE: NORMAL
EOSINOPHIL # BLD AUTO: 0.31 THOUSAND/ΜL (ref 0–0.61)
EOSINOPHIL NFR BLD AUTO: 5 % (ref 0–6)
ERYTHROCYTE [DISTWIDTH] IN BLOOD BY AUTOMATED COUNT: 15.4 % (ref 11.6–15.1)
GFR SERPL CREATININE-BSD FRML MDRD: 85 ML/MIN/1.73SQ M
GLUCOSE SERPL-MCNC: 88 MG/DL (ref 65–140)
HCT VFR BLD AUTO: 39.9 % (ref 36.5–49.3)
HGB BLD-MCNC: 11.8 G/DL (ref 12–17)
IMM GRANULOCYTES # BLD AUTO: 0.05 THOUSAND/UL (ref 0–0.2)
IMM GRANULOCYTES NFR BLD AUTO: 1 % (ref 0–2)
LYMPHOCYTES # BLD AUTO: 1.25 THOUSANDS/ΜL (ref 0.6–4.47)
LYMPHOCYTES NFR BLD AUTO: 19 % (ref 14–44)
MCH RBC QN AUTO: 24.8 PG (ref 26.8–34.3)
MCHC RBC AUTO-ENTMCNC: 29.6 G/DL (ref 31.4–37.4)
MCV RBC AUTO: 84 FL (ref 82–98)
MONOCYTES # BLD AUTO: 0.62 THOUSAND/ΜL (ref 0.17–1.22)
MONOCYTES NFR BLD AUTO: 10 % (ref 4–12)
NEUTROPHILS # BLD AUTO: 4.22 THOUSANDS/ΜL (ref 1.85–7.62)
NEUTS SEG NFR BLD AUTO: 65 % (ref 43–75)
NRBC BLD AUTO-RTO: 0 /100 WBCS
PLATELET # BLD AUTO: 177 THOUSANDS/UL (ref 149–390)
PMV BLD AUTO: 11 FL (ref 8.9–12.7)
POTASSIUM SERPL-SCNC: 4.1 MMOL/L (ref 3.5–5.3)
RBC # BLD AUTO: 4.76 MILLION/UL (ref 3.88–5.62)
SODIUM SERPL-SCNC: 141 MMOL/L (ref 136–145)
WBC # BLD AUTO: 6.47 THOUSAND/UL (ref 4.31–10.16)

## 2022-06-15 PROCEDURE — 85025 COMPLETE CBC W/AUTO DIFF WBC: CPT | Performed by: PHYSICIAN ASSISTANT

## 2022-06-15 PROCEDURE — 94660 CPAP INITIATION&MGMT: CPT

## 2022-06-15 PROCEDURE — NC001 PR NO CHARGE: Performed by: SURGERY

## 2022-06-15 PROCEDURE — 80048 BASIC METABOLIC PNL TOTAL CA: CPT | Performed by: PHYSICIAN ASSISTANT

## 2022-06-15 PROCEDURE — 99238 HOSP IP/OBS DSCHRG MGMT 30/<: CPT | Performed by: SURGERY

## 2022-06-15 RX ORDER — ALBUTEROL SULFATE 90 UG/1
2 AEROSOL, METERED RESPIRATORY (INHALATION) EVERY 4 HOURS PRN
Qty: 1 G | Refills: 0 | Status: SHIPPED | OUTPATIENT
Start: 2022-06-15 | End: 2022-07-15

## 2022-06-15 RX ORDER — FLUTICASONE PROPIONATE AND SALMETEROL 100; 50 UG/1; UG/1
1 POWDER RESPIRATORY (INHALATION) EVERY 12 HOURS SCHEDULED
Status: DISCONTINUED | OUTPATIENT
Start: 2022-06-15 | End: 2022-06-15

## 2022-06-15 RX ORDER — FLUTICASONE FUROATE AND VILANTEROL 100; 25 UG/1; UG/1
1 POWDER RESPIRATORY (INHALATION) DAILY
Status: DISCONTINUED | OUTPATIENT
Start: 2022-06-15 | End: 2022-06-15 | Stop reason: HOSPADM

## 2022-06-15 RX ORDER — FLUTICASONE PROPIONATE AND SALMETEROL 100; 50 UG/1; UG/1
1 POWDER RESPIRATORY (INHALATION) EVERY 12 HOURS SCHEDULED
Qty: 60 BLISTER | Refills: 0 | Status: SHIPPED | OUTPATIENT
Start: 2022-06-15

## 2022-06-15 RX ORDER — FLUTICASONE FUROATE AND VILANTEROL 100; 25 UG/1; UG/1
1 POWDER RESPIRATORY (INHALATION) DAILY
Qty: 60 BLISTER | Refills: 0 | Status: SHIPPED | OUTPATIENT
Start: 2022-06-16 | End: 2022-06-15

## 2022-06-15 RX ADMIN — FLUTICASONE FUROATE AND VILANTEROL TRIFENATATE 1 PUFF: 100; 25 POWDER RESPIRATORY (INHALATION) at 08:14

## 2022-06-15 RX ADMIN — ENOXAPARIN SODIUM 40 MG: 40 INJECTION SUBCUTANEOUS at 08:14

## 2022-06-15 NOTE — PLAN OF CARE
Problem: MOBILITY - ADULT  Goal: Maintain or return to baseline ADL function  Description: INTERVENTIONS:  -  Assess patient's ability to carry out ADLs; assess patient's baseline for ADL function and identify physical deficits which impact ability to perform ADLs (bathing, care of mouth/teeth, toileting, grooming, dressing, etc )  - Assess/evaluate cause of self-care deficits   - Assess range of motion  - Assess patient's mobility; develop plan if impaired  - Assess patient's need for assistive devices and provide as appropriate  - Encourage maximum independence but intervene and supervise when necessary  - Involve family in performance of ADLs  - Assess for home care needs following discharge   - Consider OT consult to assist with ADL evaluation and planning for discharge  - Provide patient education as appropriate  Outcome: Progressing  Goal: Maintains/Returns to pre admission functional level  Description: INTERVENTIONS:  - Perform BMAT or MOVE assessment daily    - Set and communicate daily mobility goal to care team and patient/family/caregiver  - Collaborate with rehabilitation services on mobility goals if consulted  - Perform Range of Motion 3 times a day  - Reposition patient every 2 hours    - Dangle patient 3 times a day  - Stand patient 3 times a day  - Ambulate patient 3 times a day  - Out of bed to chair 3 times a day   - Out of bed for meals 3 times a day  - Out of bed for toileting  - Record patient progress and toleration of activity level   Outcome: Progressing     Problem: Prexisting or High Potential for Compromised Skin Integrity  Goal: Skin integrity is maintained or improved  Description: INTERVENTIONS:  - Identify patients at risk for skin breakdown  - Assess and monitor skin integrity  - Assess and monitor nutrition and hydration status  - Monitor labs   - Assess for incontinence   - Turn and reposition patient  - Assist with mobility/ambulation  - Relieve pressure over bony prominences  - Avoid friction and shearing  - Provide appropriate hygiene as needed including keeping skin clean and dry  - Evaluate need for skin moisturizer/barrier cream  - Collaborate with interdisciplinary team   - Patient/family teaching  - Consider wound care consult   Outcome: Progressing     Problem: Potential for Falls  Goal: Patient will remain free of falls  Description: INTERVENTIONS:  - Educate patient/family on patient safety including physical limitations  - Instruct patient to call for assistance with activity   - Consult OT/PT to assist with strengthening/mobility   - Keep Call bell within reach  - Keep bed low and locked with side rails adjusted as appropriate  - Keep care items and personal belongings within reach  - Initiate and maintain comfort rounds  - Make Fall Risk Sign visible to staff  - Offer Toileting every 2 Hours, in advance of need  - Initiate/Maintain alarm  - Obtain necessary fall risk management equipment:   - Apply yellow socks and bracelet for high fall risk patients  - Consider moving patient to room near nurses station  Outcome: Progressing     Problem: PAIN - ADULT  Goal: Verbalizes/displays adequate comfort level or baseline comfort level  Description: Interventions:  - Encourage patient to monitor pain and request assistance  - Assess pain using appropriate pain scale  - Administer analgesics based on type and severity of pain and evaluate response  - Implement non-pharmacological measures as appropriate and evaluate response  - Consider cultural and social influences on pain and pain management  - Notify physician/advanced practitioner if interventions unsuccessful or patient reports new pain  Outcome: Progressing     Problem: INFECTION - ADULT  Goal: Absence or prevention of progression during hospitalization  Description: INTERVENTIONS:  - Assess and monitor for signs and symptoms of infection  - Monitor lab/diagnostic results  - Monitor all insertion sites, i e  indwelling lines, tubes, and drains  - Monitor endotracheal if appropriate and nasal secretions for changes in amount and color  - [place] appropriate cooling/warming therapies per order  - Administer medications as ordered  - Instruct and encourage patient and family to use good hand hygiene technique  - Identify and instruct in appropriate isolation precautions for identified infection/condition  Outcome: Progressing  Goal: Absence of fever/infection during neutropenic period  Description: INTERVENTIONS:  - Monitor WBC    Outcome: Progressing     Problem: SAFETY ADULT  Goal: Maintain or return to baseline ADL function  Description: INTERVENTIONS:  -  Assess patient's ability to carry out ADLs; assess patient's baseline for ADL function and identify physical deficits which impact ability to perform ADLs (bathing, care of mouth/teeth, toileting, grooming, dressing, etc )  - Assess/evaluate cause of self-care deficits   - Assess range of motion  - Assess patient's mobility; develop plan if impaired  - Assess patient's need for assistive devices and provide as appropriate  - Encourage maximum independence but intervene and supervise when necessary  - Involve family in performance of ADLs  - Assess for home care needs following discharge   - Consider OT consult to assist with ADL evaluation and planning for discharge  - Provide patient education as appropriate  Outcome: Progressing  Goal: Maintains/Returns to pre admission functional level  Description: INTERVENTIONS:  - Perform BMAT or MOVE assessment daily    - Set and communicate daily mobility goal to care team and patient/family/caregiver  - Collaborate with rehabilitation services on mobility goals if consulted  - Perform Range of Motion 3 times a day  - Reposition patient every 2 hours    - Dangle patient 3 times a day  - Stand patient 3 times a day  - Ambulate patient 3 times a day  - Out of bed to chair 3 times a day   - Out of bed for meals 3 times a day  - Out of bed for toileting  - Record patient progress and toleration of activity level   Outcome: Progressing  Goal: Patient will remain free of falls  Description: INTERVENTIONS:  - Educate patient/family on patient safety including physical limitations  - Instruct patient to call for assistance with activity   - Consult OT/PT to assist with strengthening/mobility   - Keep Call bell within reach  - Keep bed low and locked with side rails adjusted as appropriate  - Keep care items and personal belongings within reach  - Initiate and maintain comfort rounds  - Make Fall Risk Sign visible to staff  - Offer Toileting every 2 Hours, in advance of need  - Initiate/Maintain alarm  - Obtain necessary fall risk management equipment:   - Apply yellow socks and bracelet for high fall risk patients  - Consider moving patient to room near nurses station  Outcome: Progressing     Problem: DISCHARGE PLANNING  Goal: Discharge to home or other facility with appropriate resources  Description: INTERVENTIONS:  - Identify barriers to discharge w/patient and caregiver  - Arrange for needed discharge resources and transportation as appropriate  - Identify discharge learning needs (meds, wound care, etc )  - Arrange for interpretive services to assist at discharge as needed  - Refer to Case Management Department for coordinating discharge planning if the patient needs post-hospital services based on physician/advanced practitioner order or complex needs related to functional status, cognitive ability, or social support system  Outcome: Progressing     Problem: Knowledge Deficit  Goal: Patient/family/caregiver demonstrates understanding of disease process, treatment plan, medications, and discharge instructions  Description: Complete learning assessment and assess knowledge base    Interventions:  - Provide teaching at level of understanding  - Provide teaching via preferred learning methods  Outcome: Progressing     Problem: Nutrition/Hydration-ADULT  Goal: Nutrient/Hydration intake appropriate for improving, restoring or maintaining nutritional needs  Description: Monitor and assess patient's nutrition/hydration status for malnutrition  Collaborate with interdisciplinary team and initiate plan and interventions as ordered  Monitor patient's weight and dietary intake as ordered or per policy  Utilize nutrition screening tool and intervene as necessary  Determine patient's food preferences and provide high-protein, high-caloric foods as appropriate       INTERVENTIONS:  - Monitor oral intake, urinary output, labs, and treatment plans  - Assess nutrition and hydration status and recommend course of action  - Evaluate amount of meals eaten  - Assist patient with eating if necessary   - Allow adequate time for meals  - Recommend/ encourage appropriate diets, oral nutritional supplements, and vitamin/mineral supplements  - Order, calculate, and assess calorie counts as needed  - Recommend, monitor, and adjust tube feedings and TPN/PPN based on assessed needs  - Assess need for intravenous fluids  - Provide specific nutrition/hydration education as appropriate  - Include patient/family/caregiver in decisions related to nutrition  Outcome: Progressing

## 2022-06-15 NOTE — DISCHARGE INSTRUCTIONS
Neurosurgery discharge instructions following traumatic head bleed:     Do not take any blood thinning medications (ie  No Advil  No motrin  No ibuprofen  No Aleve  No Aspirin  No fishoil  No heparin  No antiplatelet / no anticoagulation medication) for two weeks  Refrain from activity that increases chance of trauma to head or falls  Recommend you take fall precaution  No strenuous activity or sports  Return to hospital Emergency Room if you experience worsening / new headache, nausea/vomiting, speech/vision change, seizure, confusion / mental status change, weakness, or other neurological changes

## 2022-06-15 NOTE — PROGRESS NOTES
[facility]  Progress Note - Nae Rick 1985, 29 y o  male MRN: [id number]  Unit/Bed#: [facility] 259-88 Encounter: [id number]  Primary Care Provider: No primary care provider on file  Date and time admitted to hospital: 6/12/2022  3:32 AM    Asthma  Assessment & Plan  · Respiratory protocol  · Will discuss with patient home medications    Class 3 severe obesity due to excess calories in adult [facility])  Assessment & Plan  · Encourage therapeutic lifestyle changes  · Consider follow-up with metabolic medicine or bariatric surgery    Anasarca  Assessment & Plan  · Consider PRN Lasix  · Patient will likely need ECHO in outpatient setting due to poor study related to habitus/immobility  · Encourage out of bed as able    Acute respiratory failure with hypoxia and hypercapnia (HCC)  Assessment & Plan  · Significant improvement on non-invasive positive pressure  · Transition to nasal cannula this AM  · Pending pulmonary consultation  · Will need evaluation from case management for assistance with obtaining home bipap/cpap as patient and family report significant insurance hurdles in the outpatient setting  · BiPAP approved; will follow-up and likely discharged on 06/15  · Continue non-invasive positive pressure with sleep and naps    Nasal bone fracture  Assessment & Plan  · Appreciate OMFS recommendations  · Unasyn discontinued  · Saline drops to nose off bipap  · Humidified oxygen  · Follow-up outpatient one week after discharge    * Acute encephalopathy  Assessment & Plan  · Significantly improved with non-invasive positive pressure and normalization of pH  · Appreciate neurosurgery evaluation  · Signed off, can consider TBI rehab if needed  · Patient remains GCS 15 with no focal deficits  · Avoid sedating medications  · Delirium precautions  · PT/OT    DVT prophylaxis:  SCDs and Lovenox  PT and OT:  Eval and treat    Disposition:  DC today    Case management work on getting BiPAP yesterday on 06/14/2022  Will follow up with case management today  Patient medically stable for discharge with BiPAP  SUBJECTIVE:  Chief Complaint:  I am ready to go home    Subjective:  Patient offering no new complaints today on presentation  Currently resting in bed  Accompanied by his family today  Reports he slept overnight with a BiPAP    OBJECTIVE:   Vitals:   Temp:  [98 °F (36 7 °C)] 98 °F (36 7 °C)  HR:  [102-104] 104  Resp:  [19-20] 20  BP: (124-125)/(82-84) 124/84    Intake/Output:  I/O       06/13 0701  06/14 0700 06/14 0701  06/15 0700 06/15 0701  06/16 0700    P  O  480 120     I V  (mL/kg) 78 8 (0 6)      IV Piggyback       Total Intake(mL/kg) 558 8 (4 2) 120 (0 9)     Urine (mL/kg/hr) 1000 (0 3)      Stool 0      Total Output 1000      Net -441 3 +120            Unmeasured Urine Occurrence 1 x      Unmeasured Stool Occurrence 1 x           Nutrition: Diet Regular; Regular House    Physical Exam:   GENERAL APPEARANCE:  No acute distress  NEURO:  GCS 15  HEENT:  Normocephalic  CV:  Regular rate and rhythm  LUNGS:  CTA bilaterally  GI:  Nontender, nondistended  :  No Wan  MSK:  +2 pulses on extremities  SKIN:  Warm, dry, intact    Invasive Devices  Report    Peripheral Intravenous Line  Duration           Peripheral IV 06/12/22 Proximal;Right;Ventral (anterior) Forearm 2 days                      Lab Results: Results: I have personally reviewed all pertinent laboratory/tests results, BMP/CMP: No results found for: SODIUM, K, CL, CO2, ANIONGAP, BUN, CREATININE, GLUCOSE, CALCIUM, AST, ALT, ALKPHOS, PROT, BILITOT, EGFR and CBC: No results found for: WBC, HGB, HCT, MCV, PLT, ADJUSTEDWBC, MCH, MCHC, RDW, MPV, NRBC  Imaging/EKG Studies: I have personally reviewed pertinent reports       Other Studies:  No other studies

## 2022-06-15 NOTE — DISCHARGE SUMMARY
Discharge Summary - Lashawn Levi 29 y o  male MRN: [id number]    [facility] Encounter: [id number]    Admission Date:   Admission Orders (From admission, onward)     Ordered        06/12/22 0516  Inpatient Admission  Once                        Admitting Diagnosis: Closed fracture of nasal bone, initial encounter [S02  2XXA]  Unspecified multiple injuries, initial encounter [T07  XXXA]    HPI: Per Jane Dickson, "Lashawn Levi is a 29 y o  male with a past medical history of JOSE on CPAP who presents as a level B trauma via EMS after a fall down 20 stairs this evening  Patient had unknown LOC and reports he was sleepwalking at the time of fall  He denies pain on arrival, GCS 15 on face mask O2 at 6L  Patient has noted right septal deviation "    Procedures Performed: No orders of the defined types were placed in this encounter  Summary of Hospital Course: Patient is a 29year old male with PMHx with JOSE on CPAP  Presents with acute respiratory failure after initial observation in the emergency department  He required CPAP secondary to his history of sleep apnea  He required ICU admission secondary to concern respiratory status  His nasal bone fractures recommended non operative management  The patient was then evaluated by pulmonology who recommended BiPAP and then he would follow up outpatient in 1-2 weeks  He has BiPAP was approved  The patient was discharged on 06/15/2022  He was given an ICS/LABA as well as a albuterol  Patient was given number for primary care provider as well as OMFS  Significant Findings, Care, Treatment and Services Provided:   XR chest portable    Result Date: 6/12/2022  Impression: Linear atelectasis in both lower lobes  Workstation performed: [id number]     CT head wo contrast    Result Date: 6/12/2022  Impression: 1  Somewhat limited examination due to patient motion artifact   2   Punctate foci of increased density at the gray-white matter junction of the frontal lobes bilaterally  Findings are suspicious for small areas of evolving hemorrhagic contusion/shearing injury  3   Increased density in the subarachnoid space along the high parietal convexities bilaterally, suspicious for small amount of subarachnoid hemorrhage  4   Accentuation of the gray-white matter differentiation with diffuse sulcal effacement  Although the findings may be artifactual and related to the patient's age as well as motion artifact, elevated intracranial pressure not excluded  Recommend follow-up neurosurgical consultation and/or MRI of the brain with FLAIR weighted imaging to better evaluate for diffuse axonal injury/shearing injury  I personally discussed this study with Tiana John on 6/12/2022 at 9:50 AM  Workstation performed: [id number]     TRAUMA - CT head wo contrast    Result Date: 6/12/2022  Impression: No acute intracranial abnormality  Please see the separate report of concurrent facial CT for description of nasal fractures  Workstation performed: [id number]     TRAUMA - CT facial bones wo contrast    Result Date: 6/12/2022  Impression: Comminuted and displaced bilateral nasal bone fractures involving the nasal septum and frontal process of the maxilla bilaterally  I personally discussed this study with Tim Kat  on 6/12/2022 at 5:06 AM  Workstation performed: [id number]     TRAUMA - CT spine cervical wo contrast    Result Date: 6/12/2022  Impression: Limited evaluation due to motion artifact  Otherwise, no definite evidence of traumatic injury in the cervical spine  Workstation performed: [id number]     TRAUMA - CT chest abdomen pelvis w contrast    Result Date: 6/12/2022  Impression: 1  Limited evaluation due to lack of contrast and streak artifact  2   Patchy and streaky opacities scattered throughout the lungs bilaterally  This is nonspecific and may reflect an infectious or inflammatory process including aspiration   3   Subcutaneous fat stranding throughout the lower ventral abdominal wall with overlying skin thickening  Correlate for soft tissue contusion in the setting of trauma  Other considerations include edema or cellulitic change  I personally discussed this study with Tim Kat  on 6/12/2022 at 5:06 AM  Workstation performed: [id number]       Complications: no complications    Discharge Diagnosis:   Patient Active Problem List   Diagnosis    Acute encephalopathy    Nasal bone fracture    Acute respiratory failure with hypoxia and hypercapnia (HCC)    Anasarca    Class 3 severe obesity due to excess calories in adult [facility])    Asthma           Medical Problems             Resolved Problems  Date Reviewed: 6/15/2022   None                 Condition at Discharge: good       Discharge instructions/Information to patient and family:   See after visit summary for information provided to patient and family  Provisions for Follow-Up Care:  See after visit summary for information related to follow-up care and any pertinent home health orders  PCP: No primary care provider on file  Disposition: Home    Planned Readmission: No    Discharge Statement   I spent 23 minutes discharging the patient  This time was spent on the day of discharge  I had direct contact with the patient on the day of discharge  Additional documentation is required if more than 30 minutes were spent on discharge  Discharge Medications:  See after visit summary for reconciled discharge medications provided to patient and family

## 2022-06-15 NOTE — CASE MANAGEMENT
Case Management Discharge Planning Note    Patient name Robson Ken  Location [address] 615/PPHP 389-99 MRN [id number]  : 1985 Date 6/15/2022       Current Admission Date: 2022  Current Admission Diagnosis:Acute encephalopathy   Patient Active Problem List    Diagnosis Date Noted    Acute encephalopathy 2022    Nasal bone fracture 2022    Acute respiratory failure with hypoxia and hypercapnia ([address] ) 2022    Anasarca 2022    Class 3 severe obesity due to excess calories in adult [facility]) 2022    Asthma 2022      LOS (days): 3  Geometric Mean LOS (GMLOS) (days):   Days to GMLOS:     OBJECTIVE:  Risk of Unplanned Readmission Score: 9 12         Current admission status: Inpatient   Preferred Pharmacy:   [facility]  No address on file      Primary Care Provider: No primary care provider on file  Primary Insurance: SHANA SIMPSON  Secondary Insurance:     DISCHARGE DETAILS:    CM contacted Irasema Shannon [phone] of [facility] to discuss pt's BiPAP delivery   He is reaching out to his liaison and will follow up with [facility]

## 2022-06-16 NOTE — UTILIZATION REVIEW
Notification of Discharge   This is a Notification of Discharge from our facility [address]  Please be advised that this patient has been discharge from our facility  Below you will find the admission and discharge date and time including the patients disposition  UTILIZATION REVIEW CONTACT:  Zeke Carbone  Utilization   Network Utilization Review Department  Phone: [phone] x carefully listen to the prompts  All voicemails are confidential   Email: [email or web address]     PHYSICIAN ADVISORY SERVICES:  FOR [id number] REVIEW - MEDICAL NECESSITY DENIAL  Phone: [phone]  Fax: [phone]  Email: [email or web address]     PRESENTATION DATE: 6/12/2022  3:32 AM  OBERVATION ADMISSION DATE:   INPATIENT ADMISSION DATE: 6/12/22  5:16 AM   DISCHARGE DATE: 6/15/2022  5:30 PM  DISPOSITION: Home/Self Care Home/Self Care      IMPORTANT INFORMATION:  Send all requests for admission clinical reviews, approved or denied determinations and any other requests to dedicated fax number below belonging to the campus where the patient is receiving treatment   List of dedicated fax numbers:  [address] DENIALS (Administrative/Medical Necessity) [phone]   [address] (Maternity/NICU/Pediatrics) [phone]   Caron Ospina [phone]   130 [facility] [phone]   [address] [place] [address] [phone]   2000 [facility] [address] [place] [address],[address] [place] [address] [phone]   [facility]  [phone]   [address] [place] [address], [address]  2401 [facility] 1000 [facility] [phone]

## 2022-06-17 LAB
BACTERIA BLD CULT: NORMAL
BACTERIA BLD CULT: NORMAL

## 2022-07-07 ENCOUNTER — OFFICE VISIT (OUTPATIENT)
Dept: PULMONOLOGY | Facility: CLINIC | Age: 37
End: 2022-07-07
Payer: COMMERCIAL

## 2022-07-07 VITALS
BODY MASS INDEX: 42.84 KG/M2 | OXYGEN SATURATION: 96 % | HEART RATE: 88 BPM | WEIGHT: 282.7 LBS | DIASTOLIC BLOOD PRESSURE: 70 MMHG | HEIGHT: 68 IN | SYSTOLIC BLOOD PRESSURE: 104 MMHG | TEMPERATURE: 98.5 F

## 2022-07-07 DIAGNOSIS — J45.909 UNCOMPLICATED ASTHMA, UNSPECIFIED ASTHMA SEVERITY, UNSPECIFIED WHETHER PERSISTENT: ICD-10-CM

## 2022-07-07 DIAGNOSIS — G47.33 OSA (OBSTRUCTIVE SLEEP APNEA): Primary | ICD-10-CM

## 2022-07-07 DIAGNOSIS — E66.01 CLASS 3 SEVERE OBESITY DUE TO EXCESS CALORIES WITH SERIOUS COMORBIDITY AND BODY MASS INDEX (BMI) OF 40.0 TO 44.9 IN ADULT (HCC): ICD-10-CM

## 2022-07-07 PROCEDURE — 99214 OFFICE O/P EST MOD 30 MIN: CPT | Performed by: INTERNAL MEDICINE

## 2022-07-07 NOTE — PATIENT INSTRUCTIONS
Sleep Apnea   AMBULATORY CARE:   Sleep apnea  is a condition that causes you to stop breathing often during sleep  Types of sleep apnea:   Obstructive sleep apnea (JOSE)  is the most common kind  The muscles and tissues around your throat relax and block air from passing through  Obesity, use of alcohol or cigarettes, or a family history are common causes  JOSE may increase your risk for complications after surgery  Central sleep apnea (CSA)  means your brain does not send signals to the muscles that control breathing  You do not take a breath even though your airway is open  Common causes include medical conditions such as heart failure, being older than 40, or use of opioids  Complex (or mixed) sleep apnea  means you have both obstructive and central sleep apnea  Common signs and symptoms:   Loud snoring or long pauses in breathing    Feeling sleepy, slow, and tired during the day    Snorting, gasping, or choking while you sleep, and waking up suddenly because of these    Feeling irritable during the day    Dry mouth or a headache in the mornings    Heavy night sweating    A hard time thinking, remembering things, or focusing on your tasks the following day    Call your local emergency number (911 in the [address] [facility] [facility]) if:   You have chest pain or trouble breathing  Call your doctor if:   You have new or worsening signs or symptoms  You have questions or concerns about your condition or care  Treatment  depends on the kind of apnea you have  A mouth device  may be needed if you have mild sleep apnea  These are designed to keep your throat open  Ask your dentist or healthcare provider about the best mouth device for you  A machine  may be used to help you get more air during sleep  A mask may be placed over your nose and mouth, or just your nose  The mask is hooked to the machine  You will get air through the mask      A continuous positive airway pressure (CPAP) machine  is used to keep your airway open during sleep  The machine blows a gentle stream of air into the mask when you breathe  This helps keep your airway open so you can breathe more regularly  Extra oxygen may be given through the machine  A bilevel positive airway pressure (BiPAP) machine  gives air but lowers the pressure when you breathe out  An adaptive servo-ventilator (ASV)  is a machine that learns your usual breathing pattern  Then, it uses pressure to give you air and prevent stops in your breathing  Surgery  to expand your airway or remove extra tissues may be needed  Surgery is usually only considered if other treatments do not work  Manage or prevent sleep apnea:   Reach and maintain a healthy weight  Ask your healthcare provider what a healthy weight is for you  Ask him or her to help you create a safe weight loss plan if you are overweight  Even a small goal of a 10% weight loss can improve your symptoms  Do not smoke  Nicotine and other chemicals in cigarettes and cigars can cause lung damage  Ask your healthcare provider for information if you currently smoke and need help to quit  E-cigarettes or smokeless tobacco still contain nicotine  Talk to your healthcare provider before you use these products  Do not drink alcohol or take sedative medicine before you go to sleep  Alcohol and sedatives can relax the muscles and tissues around your throat  This can block the airflow to your lungs  Sleep on your side or use pillows designed to prevent sleep apnea  This prevents your tongue or other tissues from blocking your throat  You can also raise the head of your bed  Follow up with your doctor or specialist as directed: You may need to have blood tests during your follow-up visits  Work with your provider to find the right breathing support equipment and settings for you  Write down your questions so you remember to ask them during your visits    © Copyright [employer] 2022 Information is for End User's use only and may not be sold, redistributed or otherwise used for commercial purposes  All illustrations and images included in CareNotes® are the copyrighted property of A D A M , Inc  or Мария Moore  The above information is an  only  It is not intended as medical advice for individual conditions or treatments  Talk to your doctor, nurse or pharmacist before following any medical regimen to see if it is safe and effective for you

## 2022-07-07 NOTE — ASSESSMENT & PLAN NOTE
· Severe JOSE with an average AHI of 117 6, started on a trial of auto titrating BiPAP with significant improvement of his AHI    He is compliant and he feels significantly better his AHI today is 3 on his compliance report is using the machine 8-1/2 hours every night  · Supplies prescription placed, he is requesting to switch to full face mask

## 2022-07-07 NOTE — PROGRESS NOTES
Pulmonary/Sleep Follow Up Note   Martin Dejesus 29 y o  male MRN: [id number]  7/7/2022      Assessment and Plan:    1  JOSE (obstructive sleep apnea)  Assessment & Plan:  · Severe JOSE with an average AHI of 117 6, started on a trial of auto titrating BiPAP with significant improvement of his AHI  He is compliant and he feels significantly better his AHI today is 3 on his compliance report is using the machine 8-1/2 hours every night  · Supplies prescription placed, he is requesting to switch to full face mask    Orders:  -     PAP DME Resupply/Reorder    2  Uncomplicated asthma, unspecified asthma severity, unspecified whether persistent  Assessment & Plan:  Significantly improved using Advair and albuterol      3  Class 3 severe obesity due to excess calories with serious comorbidity and body mass index (BMI) of 40 0 to 44 9 in [facility])  Assessment & Plan:  A BMI 09 28, complicated with severe JOSE, he would benefit from weight loss          Return in about 6 months (around 1/7/2023)  History of Present Illness   HPI:  Martin Dejesus is a 29 y o  male who is here for hospital follow-up he has history of severe obstructive sleep apnea and bronchial asthma he was never compliant using BiPAP machine and he has been also smoker  He was admitted last month in the hospital after having a fall and broke his nose  He was assessed with nocturnal pulse ox study to evaluate for and qualify for a BiPAP while he was admitted otherwise he with a had to have a PAP titration study, likely he qualified and was started on trial of auto titrating BiPAP  He tells me today that his sleep quality has gotten significantly better since he started using the BiPAP with minimal residual AHI and significant improvement of his excessive daytime sleepiness and fatigue      Review of Systems    Historical Information   Past Medical History:   Diagnosis Date    Acute on chronic respiratory failure with hypercapnia ([address] )     Asthma     Morbid [address] ([facility] [address] )     Nocturnal hypoxemia      History reviewed  No pertinent surgical history  History reviewed  No pertinent family history  Meds/Allergies     Current Outpatient Medications:     albuterol (PROVENTIL HFA,VENTOLIN HFA) 90 mcg/act inhaler, Inhale 2 puffs every 6 (six) hours as needed for wheezing, Disp: 6 7 g, Rfl: 0    albuterol (PROVENTIL HFA,VENTOLIN HFA) 90 mcg/act inhaler, Inhale 2 puffs every 4 (four) hours as needed for wheezing or shortness of breath, Disp: 1 g, Rfl: 0    Asmanex  MCG/ACT AERO, Inhale 1 puff (200 mcg total) daily Rinse mouth after use , Disp: 13 g, Rfl: 0    Fluticasone-Salmeterol (Advair) 100-50 mcg/dose inhaler, Inhale 1 puff every 12 (twelve) hours Rinse mouth after use , Disp: 60 blister, Rfl: 0  Allergies   Allergen Reactions    Pollen Extract Other (See Comments)     Hives       Vitals: Blood pressure 104/70, pulse 88, temperature 98 5 °F (36 9 °C), height 5' 8" (1 727 m), weight 128 kg (282 lb 11 2 oz), SpO2 96 %  Body mass index is 42 98 kg/m²  Oxygen Therapy  SpO2: 96 %  Oxygen Therapy: None (Room air)      Physical Exam  General:  Awake alert and oriented x 3, conversant without conversational dyspnea, NAD, normal affect  HEENT:   Sclera noninjected, nonicteric OU, Nares patent,  no craniofacial abnormalities, Mucous membranes, moist, no oral lesions, normal dentition  NECK:  Trachea midline, no accessory muscle use, no stridor,  JVP not elevated  CARDIAC: Reg, single s1/S2, no m/r/g  PULM: CTA bilaterally no wheezing, rhonchi or rales  ABD: Soft nontender, nondistended, no rebound, no rigidity, no guarding  EXT: No cyanosis, no clubbing, no edema, normal capillary refill  NEURO: no focal neurologic deficits, AAOx3, moving all extremities appropriately    Labs: I have personally reviewed pertinent lab results  , ABG: No results found for: PHART, SAJ7NZB, PO2ART, NJT6QKX, N3JRGSUL, BEART, SOURCE, BNP: No results found for: BNP, CBC: No results found for: WBC, HGB, HCT, MCV, PLT, ADJUSTEDWBC, MCH, MCHC, RDW, MPV, NRBC, CMP: No results found for: SODIUM, K, CL, CO2, ANIONGAP, BUN, CREATININE, GLUCOSE, CALCIUM, AST, ALT, ALKPHOS, PROT, BILITOT, EGFR, PT/INR: No results found for: PT, INR, Troponin: No results found for: TROPONINI  Lab Results   Component Value Date    WBC 6 47 06/15/2022    HGB 11 8 (L) 06/15/2022    HCT 39 9 06/15/2022    MCV 84 06/15/2022     06/15/2022     Lab Results   Component Value Date    GLUCOSE 111 06/12/2022    CALCIUM 8 7 06/15/2022    K 4 1 06/15/2022    CO2 33 (H) 06/15/2022     06/15/2022    BUN 13 06/15/2022    CREATININE 1 11 06/15/2022     No results found for: IGE  Lab Results   Component Value Date    ALT 41 06/12/2022    AST 72 (H) 06/12/2022    ALKPHOS 78 06/12/2022             Sleep studies: I have personally reviewed pertinent reports  The patient slept poorly for a total of 223 minutes representing sleep efficiency of 75% sleep architecture showed reduced sleep latency to 1 minute, reduced stage N3 and stage R, increased sleep wake transitions  He had severely increased number of sleep disordered breathing events with average AHI of 117 6 events per hour,  majority of events were obstructive however mixed with some central apneas, his oxygen saturation remained below 90% for the entire study with an oxygen wayne of 51%  Thus, he meets the criteria for the diagnosis of severe obstructive sleep apnea associated with nocturnal hypoxemia and possible hypoventilation  In view of the patients severity, an in-lab PAP titration study with or without oxygen would be recommended, clinical correlation suggested  Compliance report:I have personally reviewed pertinent reports        Type of CPAP:  Auto BIPAP 10 +4                                   Percent usage: 100% 6/15-7/7/2022                                   Average time used: 8 hr 29 min                                  Time in large leak: not sig Residual AHI: 3            Chester Gomez MD  [facility]       Portions of the record may have been created with voice recognition software  Occasional wrong word or "sound a like" substitutions may have occurred due to the inherent limitations of voice recognition software  Read the chart carefully and recognize, using context, where substitutions have occurred

## 2022-09-15 ENCOUNTER — TELEPHONE (OUTPATIENT)
Dept: PULMONOLOGY | Facility: CLINIC | Age: 37
End: 2022-09-15

## 2022-09-15 NOTE — TELEPHONE ENCOUNTER
Pt called in ref to his Cpap machine he received in the hospital  On 07/07 he came in for his follow up with Dr Kaz Lopez  Pt left under the impression he would be receiving a Full Face Mask for the Cpap machine and has yet to receive it   Please advise

## 2023-01-13 ENCOUNTER — OFFICE VISIT (OUTPATIENT)
Dept: PULMONOLOGY | Facility: CLINIC | Age: 38
End: 2023-01-13

## 2023-01-13 ENCOUNTER — DOCUMENTATION (OUTPATIENT)
Dept: PULMONOLOGY | Facility: CLINIC | Age: 38
End: 2023-01-13

## 2023-01-13 VITALS
DIASTOLIC BLOOD PRESSURE: 62 MMHG | HEIGHT: 68 IN | HEART RATE: 103 BPM | RESPIRATION RATE: 15 BRPM | BODY MASS INDEX: 40.47 KG/M2 | TEMPERATURE: 98.1 F | SYSTOLIC BLOOD PRESSURE: 108 MMHG | WEIGHT: 267 LBS | OXYGEN SATURATION: 97 %

## 2023-01-13 DIAGNOSIS — E66.01 CLASS 3 SEVERE OBESITY DUE TO EXCESS CALORIES WITH SERIOUS COMORBIDITY AND BODY MASS INDEX (BMI) OF 40.0 TO 44.9 IN ADULT (HCC): ICD-10-CM

## 2023-01-13 DIAGNOSIS — J96.02 ACUTE RESPIRATORY FAILURE WITH HYPOXIA AND HYPERCAPNIA (HCC): ICD-10-CM

## 2023-01-13 DIAGNOSIS — G47.33 OSA (OBSTRUCTIVE SLEEP APNEA): Primary | ICD-10-CM

## 2023-01-13 DIAGNOSIS — J96.01 ACUTE RESPIRATORY FAILURE WITH HYPOXIA AND HYPERCAPNIA (HCC): ICD-10-CM

## 2023-01-13 DIAGNOSIS — L03.019 PARONYCHIA OF FINGER: ICD-10-CM

## 2023-01-13 DIAGNOSIS — J45.909 UNCOMPLICATED ASTHMA, UNSPECIFIED ASTHMA SEVERITY, UNSPECIFIED WHETHER PERSISTENT: ICD-10-CM

## 2023-01-13 RX ORDER — ALBUTEROL SULFATE 90 UG/1
2 AEROSOL, METERED RESPIRATORY (INHALATION) EVERY 6 HOURS PRN
Qty: 6.7 G | Refills: 11 | Status: SHIPPED | OUTPATIENT
Start: 2023-01-13

## 2023-01-13 RX ORDER — FLUTICASONE PROPIONATE AND SALMETEROL 100; 50 UG/1; UG/1
1 POWDER RESPIRATORY (INHALATION) EVERY 12 HOURS SCHEDULED
Qty: 60 BLISTER | Refills: 11 | Status: SHIPPED | OUTPATIENT
Start: 2023-01-13

## 2023-01-13 NOTE — ASSESSMENT & PLAN NOTE
Patient has not had any recent exacerbations  He feels well controlled  He will continue to use Advair 100-50, 1 puff twice daily and albuterol HFA, 2 puffs every 6 hours as needed

## 2023-01-13 NOTE — PROGRESS NOTES
Pulmonary Follow Up Note   Karthikeyan Conde 40 y o  male MRN: [id number]  1/13/2023      Assessment/Plan:     Asthma  Patient has not had any recent exacerbations  He feels well controlled  He will continue to use Advair 100-50, 1 puff twice daily and albuterol HFA, 2 puffs every 6 hours as needed  JOSE (obstructive sleep apnea)  Patient will continue with BiPAP  It is set to auto with a max IPAP of 25 cmH20 and a min EPAP of 10 cmH20 and a pressure support of 4 cmH20  Class 3 severe obesity due to excess calories in [facility])  Patient has been going to the gym and eating a healthier diet  Continue to encourage weigh tloss through exercise and healthy eating  Visit orders:    Problem List Items Addressed This Visit        Respiratory    Asthma     Patient has not had any recent exacerbations  He feels well controlled  He will continue to use Advair 100-50, 1 puff twice daily and albuterol HFA, 2 puffs every 6 hours as needed  Relevant Medications    Fluticasone-Salmeterol (Advair) 100-50 mcg/dose inhaler    albuterol (PROVENTIL HFA,VENTOLIN HFA) 90 mcg/act inhaler    JOSE (obstructive sleep apnea) - Primary     Patient will continue with BiPAP  It is set to auto with a max IPAP of 25 cmH20 and a min EPAP of 10 cmH20 and a pressure support of 4 cmH20  Relevant Orders    PAP DME Resupply/Reorder    RESOLVED: Acute respiratory failure with hypoxia and hypercapnia (HCC)       Other    Class 3 severe obesity due to excess calories in [facility])     Patient has been going to the gym and eating a healthier diet  Continue to encourage weigh tloss through exercise and healthy eating  Other Visit Diagnoses     Paronychia of finger        Relevant Medications    albuterol (PROVENTIL HFA,VENTOLIN HFA) 90 mcg/act inhaler          Return in about 1 year (around 1/13/2024)      History of Present Illness   HPI:  Karthikeyan Conde is a 40 y o  male who is here today for follow up for severe obstructive and asthma  Patient has been doing well since he started the BiPAP  He has been 100% compliant for 30/30 days with an AHI of 1 0  He wears his BiPAP for an average of 8 hours and 10 minutes each night  He feels well rested in the morning  He has been able to go to the gym and lose weight  He has not had any recent falls or asthma exacerbations  Review of Systems   Constitutional: Negative for activity change, chills, fatigue and fever  HENT: Negative for postnasal drip  Respiratory: Negative for choking, chest tightness, shortness of breath and wheezing  Cardiovascular: Negative for chest pain and leg swelling  Psychiatric/Behavioral: Negative for decreased concentration and sleep disturbance  All other systems reviewed and are negative  Medical, Family and Social history reviewed and updated as appropriate    Historical Information   Past Medical History:   Diagnosis Date   • Acute on chronic respiratory failure with hypercapnia ([facility] [address] )    • Asthma    • Morbid obesity ([facility] [address] )    • Nocturnal hypoxemia      History reviewed  No pertinent surgical history  History reviewed  No pertinent family history      Social History     Tobacco Use   Smoking Status Former   • Packs/day: 1 50   • Years: 10 00   • Pack years: 15 00   • Types: Cigarettes   • Start date: 2012   • Quit date: 2022   • Years since quittin 5   Smokeless Tobacco Never   Tobacco Comments    Has stopped smoking for about a month, since his hospitalization         Meds/Allergies     Current Outpatient Medications:   •  albuterol (PROVENTIL HFA,VENTOLIN HFA) 90 mcg/act inhaler, Inhale 2 puffs every 6 (six) hours as needed for wheezing, Disp: 6 7 g, Rfl: 11  •  Fluticasone-Salmeterol (Advair) 100-50 mcg/dose inhaler, Inhale 1 puff every 12 (twelve) hours Rinse mouth after use , Disp: 60 blister, Rfl: 11  Allergies   Allergen Reactions   • Pollen Extract Other (See Comments)     Hives       Vitals: Blood pressure 108/62, pulse 103, temperature 98 1 °F (36 7 °C), temperature source Tympanic, resp  rate 15, height 5' 8" (1 727 m), weight 121 kg (267 lb), SpO2 97 %  Body mass index is 40 6 kg/m²  Oxygen Therapy  SpO2: 97 %  Oxygen Therapy: None (Room air)    Physical Exam   Physical Exam  Vitals reviewed  Constitutional:       General: He is not in acute distress  Appearance: Normal appearance  He is not ill-appearing  HENT:      Head: Normocephalic and atraumatic  Nose: Nose normal       Mouth/Throat:      Mouth: Mucous membranes are moist       Pharynx: Oropharynx is clear  Eyes:      Conjunctiva/sclera: Conjunctivae normal    Cardiovascular:      Rate and Rhythm: Normal rate and regular rhythm  Heart sounds: Normal heart sounds  Pulmonary:      Effort: Pulmonary effort is normal  No respiratory distress  Breath sounds: Normal breath sounds  No stridor  No wheezing, rhonchi or rales  Chest:      Chest wall: No tenderness  Abdominal:      Palpations: Abdomen is soft  Musculoskeletal:         General: Normal range of motion  Cervical back: Normal range of motion  Skin:     General: Skin is warm and dry  Neurological:      General: No focal deficit present  Mental Status: He is alert and oriented to person, place, and time  Psychiatric:         Mood and Affect: Mood normal          Behavior: Behavior normal          Labs: I have personally reviewed pertinent lab results    Lab Results   Component Value Date    WBC 6 47 06/15/2022    HGB 11 8 (L) 06/15/2022    HCT 39 9 06/15/2022    MCV 84 06/15/2022     06/15/2022     Lab Results   Component Value Date    GLUCOSE 111 06/12/2022    CALCIUM 8 7 06/15/2022    K 4 1 06/15/2022    CO2 33 (H) 06/15/2022     06/15/2022    BUN 13 06/15/2022    CREATININE 1 11 06/15/2022     No results found for: IGE  Lab Results   Component Value Date    ALT 41 06/12/2022    AST 72 (H) 06/12/2022    ALKPHOS 78 06/12/2022       Imaging and other studies: I have personally reviewed pertinent reports  Other Studies: I have personally reviewed pertinent reports  and I have personally reviewed pertinent films in PACS     4/27/22 Diagnostic sleep study: AHI- 117 6 with the majority of events being obstructive however mixed with some central apneas  Patient slept poorly for a total of 223 minutes  His oxygen saturation remained below 90% for the entire study with an oxygen wayne of 51%  He meets the criteria for the diagnosis of severe obstructive sleep apnea associated with nocturnal hypoxemia and possible hypoventilation

## 2023-01-13 NOTE — ASSESSMENT & PLAN NOTE
Patient will continue with BiPAP  It is set to auto with a max IPAP of 25 cmH20 and a min EPAP of 10 cmH20 and a pressure support of 4 cmH20

## 2023-01-13 NOTE — PATIENT INSTRUCTIONS
BiPAP   AMBULATORY CARE:   Bilevel positive airway pressure (BiPAP)  is a treatment that uses mild air pressure to keep your airways open while you sleep  BiPAP is used to treat obstructive sleep apnea (JOSE) in people who cannot tolerate CPAP treatment  It is also used in people with obesity hypoventilation syndrome, central apnea, or restrictive or obstructive lung problems  Difference between BiPAP and CPAP:  The BiPAP machine delivers a higher amount of air pressure when you breathe in than when you breathe out  CPAP delivers a constant level of air pressure during treatment  In both, the mask connects to the machine with a hose  Air pressure is delivered to the mask through the hose  Benefits of BiPAP:   Improves quality of sleep     Relieves daytime sleepiness     Improves memory    Reduces the risk of heart disease    Improves your mood and quality of life    Make BiPAP easier to use: At first, try to use your BiPAP for a few hours every night  Then slowly increase the length of time you use your machine  It takes time to adjust to BiPAP treatment  You may need a mask that is a different size, shape, or material   Talk to your healthcare provider if your mask feels uncomfortable or irritates your skin  You may need to use a special moisturizer made for users  Use a saline nasal spray at bedtime to help relieve nasal irritation  A chin strap to help keep your mouth closed or a different type of mask can help dry mouth  Some machines come with a heated humidifier to help relieve these symptoms  Talk to your healthcare provider if you are having problems adjusting to the air pressure  He or she can tell you how to adjust the air pressure on your BiPAP  You may need to start at a lower pressure and slowly increase it over time      Call your healthcare provider for any of the following:   Continued sleepiness during the day, even after wearing your BiPAP device as directed    Continued problems caused by BiPAP that do not improve    Questions or concerns about your condition, care, or equipment  Follow up with your healthcare provider as directed:  Tell your healthcare provider if your mask no longer fits properly  Write down your questions so you remember to ask them during your visits  © Copyright [employer] 2022 Information is for End User's use only and may not be sold, redistributed or otherwise used for commercial purposes  All illustrations and images included in CareNotes® are the copyrighted property of [employer]  [facility] [address]   The above information is an  only  It is not intended as medical advice for individual conditions or treatments  Talk to your doctor, nurse or pharmacist before following any medical regimen to see if it is safe and effective for you

## 2023-01-13 NOTE — ASSESSMENT & PLAN NOTE
Patient has been going to the gym and eating a healthier diet  Continue to encourage weigh tloss through exercise and healthy eating

## 2023-01-18 LAB

## 2023-04-17 NOTE — PATIENT INSTRUCTIONS
Follow-up with your primary care physician in the next 2-3 days if symptoms persist       Go to emergency room immediately if your symptoms are worsening  independent

## 2023-08-29 ENCOUNTER — HOSPITAL ENCOUNTER (EMERGENCY)
Facility: HOSPITAL | Age: 38
Discharge: HOME | End: 2023-08-29
Attending: EMERGENCY MEDICINE
Payer: COMMERCIAL

## 2023-08-29 VITALS
SYSTOLIC BLOOD PRESSURE: 131 MMHG | TEMPERATURE: 98.6 F | RESPIRATION RATE: 18 BRPM | DIASTOLIC BLOOD PRESSURE: 78 MMHG | OXYGEN SATURATION: 94 % | HEART RATE: 106 BPM
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
## 2023-08-29 DIAGNOSIS — L03.90 CELLULITIS: ICD-10-CM

## 2023-08-29 PROCEDURE — 99283 EMERGENCY DEPT VISIT LOW MDM: CPT

## 2023-08-29 RX ORDER — CEPHALEXIN 500 MG/1
500 CAPSULE ORAL ONCE
Status: COMPLETED | OUTPATIENT
Start: 2023-08-29 | End: 2023-08-29

## 2023-08-29 RX ORDER — CEPHALEXIN 500 MG/1
500 CAPSULE ORAL EVERY 6 HOURS SCHEDULED
Qty: 28 CAPSULE | Refills: 0 | Status: SHIPPED | OUTPATIENT
Start: 2023-08-29 | End: 2023-09-05

## 2023-08-29 RX ADMIN — CEPHALEXIN 500 MG: 500 CAPSULE ORAL at 19:42

## 2023-08-29 NOTE — ED ATTENDING ATTESTATION
Final Diagnoses:     1. Insect bite    2. Cellulitis      ED Course as of 08/30/23 0050   Tue Aug 29, 2023   1909 This is a 79-year-old male present for evaluation of right lower extremity cellulitis. The patient states that on Sunday he was bitten by something on the right anterior leg, has been increasingly painful, redness is spreading especially today. No fevers chills nausea vomiting chest pain shortness of breath. No dizziness lightheadedness falls or injuries. No history of cellulitis. No allergies to antibiotics. No major medical problems. He does have a primary care doctor. On exam mild tachycardia, no respiratory distress, belly nontender, right lower extremity is tender focally where the redness is, there is low bit of central ecchymosis/where the insect bite had occurred and the redness is surrounding it. It does go inferiorly/distally towards where the sock line is and stops there however feels warm to the touch, blanches with pressure. Assessment plan: We will do antibiotics, Keflex 4 times a day x7 days, follow-up primary care, return for worsening. I went over explicit return to ER precautions. Discussed Ace wrapping, elevation, compression. Patient and family have no question at this time       I, Kevin Ordonez MD, saw and evaluated the patient. All available labs and X-rays were ordered by me or the resident / non-physician and have been reviewed by myself. I discussed the patient with the resident / non-physician and agree with the resident's / non-physician practitioner's findings and plan as documented in the resident's / non-physician practicitioner's note, except where noted. At this point, I agree with the current assessment done in the ED. I was present during key portions of all procedures performed unless otherwise stated.      Nursing Triage:     Chief Complaint   Patient presents with   • Insect Bite     Bit on Sunday, bite area is irritated and swollen and appears to be spreading to ankle area       HPI:   As above     ASSESSMENT + PLAN:   As per ED course     Physical:     Vitals:    23 1700   BP: 131/78   BP Location: Left arm   Pulse: (!) 106   Resp: 18   Temp: 98.6 °F (37 °C)   TempSrc: Oral   SpO2: 94%       - There are no obvious limitations to social determinants of care. - Nursing note reviewed. - Vitals reviewed. - Orders placed by myself and/or advanced practitioner / resident.    - Previous chart was reviewed  - No language barrier.   - History obtained from patient. - There are no limitations to the history obtained:     Past Medical:    has a past medical history of Acute on chronic respiratory failure with hypercapnia ([address]), Asthma, Morbid obesity ([address]), and Nocturnal hypoxemia. Past Surgical:    has no past surgical history on file. Social:     Social History     Substance and Sexual Activity   Alcohol Use Not Currently    Comment: rarely     Social History     Tobacco Use   Smoking Status Former   • Packs/day: 1.50   • Years: 10.00   • Total pack years: 15.00   • Types: Cigarettes   • Start date: 2012   • Quit date: 2022   • Years since quittin.1   Smokeless Tobacco Never   Tobacco Comments    Has stopped smoking for about a month, since his hospitalization     Social History     Substance and Sexual Activity   Drug Use Not Currently       Echo:   No results found for this or any previous visit. No results found for this or any previous visit. Cath:    No results found for this or any previous visit. Code Status: Prior  Advance Directive and Living Will:      Power of :    POLST:    Medications   cephalexin (KEFLEX) capsule 500 mg (500 mg Oral Given 23)     No orders to display     No orders of the defined types were placed in this encounter.     Labs Reviewed - No data to display  Time reflects when diagnosis was documented in both MDM as applicable and the Disposition within this note     Time User Action Codes Description Comment    8/29/2023  7:14 PM Delonte Galaviz Add [W52. XXXA] Insect bite     8/29/2023  7:14 PM Delonte Galaviz Add [L93.29] Cellulitis       ED Disposition     ED Disposition   Discharge    Condition   Stable    Date/Time   Tue Aug 29, 2023  7:14 PM    Comment   Shalonda Ambrocio discharge to home/self care. Follow-up Information     Follow up With Specialties Details Why Contact Info Additional [facility] Emergency Department Emergency Medicine  If symptoms worsen [address] [id number]  [facility] Emergency Department, [address] [facility], [facility]        Discharge Medication List as of 8/29/2023  7:15 PM      START taking these medications    Details   cephalexin (KEFLEX) 500 mg capsule Take 1 capsule (500 mg total) by mouth every 6 (six) hours for 7 days, Starting Tue 8/29/2023, Until Tue 9/5/2023, Normal         CONTINUE these medications which have NOT CHANGED    Details   albuterol (PROVENTIL HFA,VENTOLIN HFA) 90 mcg/act inhaler Inhale 2 puffs every 6 (six) hours as needed for wheezing, Starting Fri 1/13/2023, Normal      Fluticasone-Salmeterol (Advair) 100-50 mcg/dose inhaler Inhale 1 puff every 12 (twelve) hours Rinse mouth after use., Starting Fri 1/13/2023, Normal           No discharge procedures on file. Prior to Admission Medications   Prescriptions Last Dose Informant Patient Reported? Taking? Fluticasone-Salmeterol (Advair) 100-50 mcg/dose inhaler   No No   Sig: Inhale 1 puff every 12 (twelve) hours Rinse mouth after use. albuterol (PROVENTIL HFA,VENTOLIN HFA) 90 mcg/act inhaler   No No   Sig: Inhale 2 puffs every 6 (six) hours as needed for wheezing      Facility-Administered Medications: None                        Portions of the record may have been created with voice recognition software.  Occasional wrong word or "sound a like" substitutions may have occurred due to the inherent limitations of voice recognition software. Read the chart carefully and recognize, using context, where substitutions have occurred.     Electronically signed by:  Gladys Vega

## 2023-08-29 NOTE — Clinical Note
Darius Tirado was seen and treated in our emergency department on 8/29/2023. Diagnosis:     Lissy Andre  .    He may return on this date: If you have any questions or concerns, please don't hesitate to call.       Corine Webb MD    ______________________________           _______________          _______________  Hospital Representative                              Date                                Time

## 2023-08-29 NOTE — DISCHARGE INSTRUCTIONS
Your workup here was not concerning for anything dangerous. Therefore there is no need for you to stay at the hospital for further testing. We feel safe to send you home. You can use Keflex for management of your symptoms. You should follow up with your PCP to assess for resolution of your symptoms and to determine if there is any further evaluation that needs to be performed. Return to the emergency department if you have any symptoms of worsening redness or fevers    Thank you for choosing [address] [place] [address] for your care!

## 2023-08-29 NOTE — ED PROVIDER NOTES
Chief Complaint   Patient presents with   • Insect Bite     Bit on , bite area is irritated and swollen and appears to be spreading to ankle area     History of Present Illness and Review of Systems   This is a 40 y.o. male with PMH significant for JOSE coming in today with complaint of insect bite. The patient reports that on  he was bit by an insect on the right lower leg. Reports some mild pain at that time however did not think much of it. Over the last several days he has been noticing redness and swelling at the site, tracking down to his ankle. Today he reported that it was notably worse and decided to come in for evaluation. He denies any history of cellulitis or soft tissue infection. Denies any diabetes or smoking history or other vascular risk factors. No recent hospitalizations or surgeries. Denies any fevers chills nausea vomiting or lightheadedness. No difficulties walking. He has no known allergies. No other symptoms currently. - No language barrier. No other complaints for this encounter. Remainder of ROS Reviewed and Non-Pertinent    Past Medical, Past Surgical History:    has a past medical history of Acute on chronic respiratory failure with hypercapnia ([address]), Asthma, Morbid obesity ([address]), and Nocturnal hypoxemia. has no past surgical history on file. Allergies:      Allergies   Allergen Reactions   • Pollen Extract Other (See Comments)     Hives       Social and Family History:     Social History     Substance and Sexual Activity   Alcohol Use Not Currently    Comment: rarely     Social History     Tobacco Use   Smoking Status Former   • Packs/day: 1.50   • Years: 10.00   • Total pack years: 15.00   • Types: Cigarettes   • Start date: 2012   • Quit date: 2022   • Years since quittin.1   Smokeless Tobacco Never   Tobacco Comments    Has stopped smoking for about a month, since his hospitalization     Social History     Substance and Sexual Activity Drug Use Not Currently       Physical Examination     Vitals:    08/29/23 1700   BP: 131/78   BP Location: Left arm   Pulse: (!) 106   Resp: 18   Temp: 98.6 °F (37 °C)   TempSrc: Oral   SpO2: 94%       Physical Exam  Vitals and nursing note reviewed. Constitutional:       General: He is not in acute distress. Appearance: He is well-developed. HENT:      Head: Normocephalic and atraumatic. Eyes:      Conjunctiva/sclera: Conjunctivae normal.   Cardiovascular:      Rate and Rhythm: Normal rate and regular rhythm. Heart sounds: No murmur heard. Pulmonary:      Effort: Pulmonary effort is normal. No respiratory distress. Breath sounds: Normal breath sounds. Abdominal:      Palpations: Abdomen is soft. Tenderness: There is no abdominal tenderness. Musculoskeletal:         General: No swelling. Cervical back: Neck supple. Skin:     General: Skin is warm and dry. Capillary Refill: Capillary refill takes less than 2 seconds. Comments: Right lower leg notable for surrounding cellulitic changes with warmth and erythema and mild swelling extending down to the ankle as photographed below    N/v intact, compartments soft. NO crepitus or bullae   Neurological:      Mental Status: He is alert. Psychiatric:         Mood and Affect: Mood normal.               Risk Stratification Tools                No orders of the defined types were placed in this encounter. Labs:   Labs Reviewed - No data to display    Imaging:     No orders to display          Procedures   Procedures      MDM:   Medical Decision Making  Sangeeta Hou is a 40 y.o. who presents with complaints of cellulitis    Vital signs are notable for borderline tachycardia, improved upon my examination, physical exam shows right lower leg cellulitic changes as photographed above, no evidence of crepitus or bullae. Compartments are soft as well, strong pulses distally. Dx: Clinically consistent with cellulitis. Doubtful to be related to musculoskeletal injury or vasculitis or otherwise based on their history, vitals, and examination. Plan: Keflex, outpatient follow-up, advised if any worsening in the next 24 to 48 hours should return for reevaluation. Disposition: Keflex, outpatient follow-up      Risk  Prescription drug management. - Reviewed relevant past office visits/hospitalizations/procedures  -Obtained pertinent history that influenced decision making from the patient's family        Final Dispo   Final Diagnosis:  1. Insect bite    2. Cellulitis      Time reflects when diagnosis was documented in both MDM as applicable and the Disposition within this note     Time User Action Codes Description Comment    8/29/2023  7:14 PM Ly Gonzalez. XXXA] Insect bite     8/29/2023  7:14 PM Ankur Smaller Add [Z57.49] Cellulitis       ED Disposition     ED Disposition   Discharge    Condition   Stable    Date/Time   Tue Aug 29, 2023  7:14 PM    Comment   Jason Granda discharge to home/self care. Follow-up Information     Follow up With Specialties Details Why Contact Info Additional [address] [facility] [address] Emergency Department Emergency Medicine  If symptoms worsen [address] [phone]  [facility] Emergency Department, [address] [place] [address], [id number] [phone]        Medications   cephalexin [facility]) capsule 500 mg (500 mg Oral Given 8/29/23 1942)       All details of the evaluation and treatment plan were made clear and additionally all questions and concerns were addressed while under my care. Portions of the record may have been created with voice recognition software. Occasional wrong word or "sound a like" substitutions may have occurred due to the inherent limitations of voice recognition software.  Read the chart carefully and recognize, using context, where substitutions have occurred. The attending physician physically available and evaluated the above patient alongside myself.       Funmilayo Stover MD  08/29/23 1055

## 2023-10-31 ENCOUNTER — OFFICE VISIT (OUTPATIENT)
Dept: UROLOGY | Facility: AMBULATORY SURGERY CENTER | Age: 38
End: 2023-10-31

## 2023-10-31 VITALS
WEIGHT: 273 LBS | HEIGHT: 68 IN | BODY MASS INDEX: 41.37 KG/M2 | DIASTOLIC BLOOD PRESSURE: 80 MMHG | OXYGEN SATURATION: 98 % | SYSTOLIC BLOOD PRESSURE: 120 MMHG | HEART RATE: 95 BPM

## 2023-10-31 DIAGNOSIS — Z30.2 ENCOUNTER FOR STERILIZATION: Primary | ICD-10-CM

## 2023-10-31 RX ORDER — LORAZEPAM 1 MG/1
1 TABLET ORAL
Qty: 1 TABLET | Refills: 0 | Status: SHIPPED | OUTPATIENT
Start: 2023-10-31

## 2023-10-31 NOTE — PROGRESS NOTES
10/31/2023      Chief Complaint   Patient presents with    Vasectomy     Consult         Assessment and Plan    40 y.o. male managed by new patient    1. Desire for elective sterilization  - exam today as below  - informed consent signed today  - continue contraception  - rx Ativan with  to/from on appt date  - shave scrotal/pubic hair day prior to appt date    Return for vasectomy as scheduled. History of Present Illness  Tan Romo is a 40 y.o. male here for evaluation of VASECTOMY CONSULT    History of genitourinary or groin trauma or surgery-No  Fathered children- 3 kids. one on way. 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 We discussed that vasectomy is in operation performed in the office in order to provide elective sterilization. This procedure should be considered a permanent option. Although there are subspecialists who perform vasectomy reversals, these operations are not 100% successful and are often not covered by insurance meaning they can come with a large out-of-pocket cost. The patient understands this. We reviewed the procedure in depth. Risks and benefits of the procedure were discussed and reviewed. Risks described included hematoma formation, Infection, sperm granuloma, epididymitis, post-vasectomy pain syndrome, and vasectomy failure  Informed consent was obtained in the office today. The patient was prescribed a benzodiazepine to take one hour prior to the procedure to assist with his comfort. He understands that he will require transportation to and from the office that day if he is to use the benzodiazepine.        He also understands he will require semen analysis testing at 3 months post procedure to ensure full sterilization. In the interim, he will require contraception during intercourse to avoid an undesired pregnancy. Usually, patients are out of work for 2-3 days. We recommend tight fitting scrotal support following the procedure along with ice packs applied to the scrotum 15 minutes on and 15 minutes off for the first 24 hours. We discussed that we do send the patient home with short course of anti-inflammatory and/or narcotic pain medication. After this discussion, the patient agrees to proceed. We will schedule him in the near future. He agrees to take oral sedative -Ativan 1 mg one hour prior to procedure. Review of Systems   Constitutional: Negative. Negative for chills, fatigue and fever. HENT: Negative. Respiratory:  Negative for shortness of breath. Cardiovascular:  Negative for chest pain. Gastrointestinal: Negative. Negative for abdominal pain. Endocrine: Negative. Musculoskeletal: Negative. Skin: Negative. Neurological: Negative. Negative for dizziness and light-headedness. Hematological: Negative. Psychiatric/Behavioral: Negative. Vitals  Vitals:    10/31/23 1517   BP: 120/80   BP Location: Left arm   Patient Position: Sitting   Cuff Size: Large   Pulse: 95   SpO2: 98%   Weight: 124 kg (273 lb)   Height: 5' 8" (1.727 m)       Physical Exam    General: Well appearing, no distress, appears stated age. HEENT:  Normocephalic, atraumatic. Conjunctiva clear. Respiratory: Nonlabored respirations, no wheeze or cough  Abdomen:  Soft nontender without hernia. No suprapubic or CVA tenderness. Genitourinary: Circumcised penis, normal phallus, orthotopic patent meatus. Testes smooth descended bilaterally into the scrotum nontender with no palpable mass. Palpably normal spermatic cord and vas deferens bilaterally. Musculoskeletal:  Normal range of motion and gait without defecit.   Neuro: No gross neurologic defect or abnormality. Steady unassisted gait. Speech and affect normal.  Dermatologic: skin warm, dry; no rash erythema or ecchymosis      Past History  Past Medical History:   Diagnosis Date    Acute on chronic respiratory failure with hypercapnia (HCC)     Asthma     Morbid obesity ([address])     Nocturnal hypoxemia      Social History     Socioeconomic History    Marital status: Single     Spouse name: None    Number of children: None    Years of education: None    Highest education level: None   Occupational History    None   Tobacco Use    Smoking status: Former     Packs/day: 1.50     Years: 10.00     Total pack years: 15.00     Types: Cigarettes     Start date: 2012     Quit date: 2022     Years since quittin.3    Smokeless tobacco: Never    Tobacco comments:     Has stopped smoking for about a month, since his hospitalization   Vaping Use    Vaping Use: Some days    Start date: 2022    Substances: Nicotine, Flavoring   Substance and Sexual Activity    Alcohol use: Not Currently     Comment: rarely    Drug use: Not Currently    Sexual activity: Not Currently   Other Topics Concern    None   Social History Narrative    None     Social Determinants of Health     Financial Resource Strain: Not on file   Food Insecurity: No Food Insecurity (2022)    Hunger Vital Sign     Worried About Running Out of Food in the Last Year: Never true     Ran Out of Food in the Last Year: Never true   Transportation Needs: No Transportation Needs (2022)    PRAPARE - Transportation     Lack of Transportation (Medical): No     Lack of Transportation (Non-Medical):  No   Physical Activity: Not on file   Stress: Not on file   Social Connections: Not on file   Intimate Partner Violence: Not on file   Housing Stability: Low Risk  (2022)    Housing Stability Vital Sign     Unable to Pay for Housing in the Last Year: No     Number of Places Lived in the Last Year: 1     Unstable Housing in the Last Year: No     Social History     Tobacco Use   Smoking Status Former    Packs/day: 1.50    Years: 10.00    Total pack years: 15.00    Types: Cigarettes    Start date: 2012    Quit date: 2022    Years since quittin.3   Smokeless Tobacco Never   Tobacco Comments    Has stopped smoking for about a month, since his hospitalization     No family history on file. The following portions of the patient's history were reviewed and updated as appropriate: allergies, current medications, past medical history, past social history, past surgical history and problem list.    Results  No results found for this or any previous visit (from the past 1 hour(s)). ]  No results found for: "PSA"  Lab Results   Component Value Date    GLUCOSE 111 2022    CALCIUM 8.7 06/15/2022    K 4.1 06/15/2022    CO2 33 (H) 06/15/2022     06/15/2022    BUN 13 06/15/2022    CREATININE 1.11 06/15/2022     Lab Results   Component Value Date    WBC 6.47 06/15/2022    HGB 11.8 (L) 06/15/2022    HCT 39.9 06/15/2022    MCV 84 06/15/2022     06/15/2022

## 2023-12-08 ENCOUNTER — TELEPHONE (OUTPATIENT)
Dept: PULMONOLOGY | Facility: CLINIC | Age: 38
End: 2023-12-08

## 2024-02-02 ENCOUNTER — OFFICE VISIT (OUTPATIENT)
Dept: INTERNAL MEDICINE CLINIC | Facility: CLINIC | Age: 39
End: 2024-02-02
Payer: COMMERCIAL

## 2024-02-02 VITALS
SYSTOLIC BLOOD PRESSURE: 128 MMHG | HEIGHT: 68 IN | TEMPERATURE: 99.1 F | HEART RATE: 101 BPM | BODY MASS INDEX: 41.07 KG/M2 | OXYGEN SATURATION: 96 % | WEIGHT: 271 LBS | DIASTOLIC BLOOD PRESSURE: 74 MMHG

## 2024-02-02 DIAGNOSIS — J45.909 ASTHMA: Primary | ICD-10-CM

## 2024-02-02 DIAGNOSIS — L03.019 PARONYCHIA OF FINGER: ICD-10-CM

## 2024-02-02 PROCEDURE — 99212 OFFICE O/P EST SF 10 MIN: CPT | Performed by: INTERNAL MEDICINE

## 2024-02-02 RX ORDER — ALBUTEROL SULFATE 90 UG/1
2 AEROSOL, METERED RESPIRATORY (INHALATION) EVERY 6 HOURS PRN
Qty: 6.7 G | Refills: 0 | Status: SHIPPED | OUTPATIENT
Start: 2024-02-02

## 2024-02-02 NOTE — PROGRESS NOTES
"Assessment/Plan:    Follow up   Asthma     Diagnoses and all orders for this visit:    Asthma    Paronychia of finger  -     albuterol (PROVENTIL HFA,VENTOLIN HFA) 90 mcg/act inhaler; Inhale 2 puffs every 6 (six) hours as needed for wheezing          Subjective:      Patient ID: Isai James is a 38 y.o. male.    Asthma  There is no cough or wheezing. Pertinent negatives include no chest pain, ear pain, headaches or myalgias. His past medical history is significant for asthma.       The following portions of the patient's history were reviewed and updated as appropriate: allergies, current medications, past family history, past medical history, past social history, past surgical history, and problem list.    Review of Systems   Constitutional: Negative.    HENT:  Negative for dental problem, drooling, ear discharge and ear pain.    Eyes:  Negative for discharge, redness and itching.   Respiratory:  Negative for apnea, cough and wheezing.    Cardiovascular:  Negative for chest pain and palpitations.   Gastrointestinal:  Negative for abdominal pain, blood in stool, diarrhea and vomiting.   Endocrine: Negative for polydipsia, polyphagia and polyuria.   Genitourinary:  Negative for decreased urine volume, dysuria and frequency.   Musculoskeletal:  Negative for arthralgias, myalgias and neck stiffness.   Skin:  Negative for pallor and wound.   Allergic/Immunologic: Negative for environmental allergies and food allergies.   Neurological:  Negative for facial asymmetry, light-headedness, numbness and headaches.   Hematological:  Negative for adenopathy. Does not bruise/bleed easily.   Psychiatric/Behavioral:  Negative for agitation, behavioral problems and confusion.          Objective:      /74 (BP Location: Left arm, Patient Position: Sitting, Cuff Size: Standard)   Pulse 101   Temp 99.1 °F (37.3 °C) (Temporal)   Ht 5' 8\" (1.727 m)   Wt 123 kg (271 lb)   SpO2 96%   BMI 41.21 kg/m²          Physical " Exam  Constitutional:       Appearance: Normal appearance. He is obese.   HENT:      Head: Normocephalic.      Nose: Nose normal.      Mouth/Throat:      Mouth: Mucous membranes are moist.   Eyes:      Pupils: Pupils are equal, round, and reactive to light.   Cardiovascular:      Rate and Rhythm: Regular rhythm.      Heart sounds: Normal heart sounds.   Pulmonary:      Breath sounds: Normal breath sounds.   Abdominal:      Palpations: Abdomen is soft.   Musculoskeletal:         General: No swelling.      Cervical back: Neck supple.   Skin:     General: Skin is warm.   Neurological:      General: No focal deficit present.      Mental Status: He is alert and oriented to person, place, and time.   Psychiatric:         Mood and Affect: Mood normal.       Astma    controlled  with   Albuterol  inhaler.     Prescription  sent  to the pharmacy.    Fup  prn.      Lawson Hemphill MD.FACP

## 2024-03-26 ENCOUNTER — APPOINTMENT (OUTPATIENT)
Dept: RADIOLOGY | Age: 39
End: 2024-03-26
Payer: COMMERCIAL

## 2024-03-26 ENCOUNTER — APPOINTMENT (OUTPATIENT)
Dept: URGENT CARE | Age: 39
End: 2024-03-26
Payer: OTHER MISCELLANEOUS

## 2024-03-26 DIAGNOSIS — R07.89 CHEST WALL PAIN: Primary | ICD-10-CM

## 2024-03-26 DIAGNOSIS — R07.89 CHEST WALL PAIN: ICD-10-CM

## 2024-03-26 PROCEDURE — G0382 LEV 3 HOSP TYPE B ED VISIT: HCPCS

## 2024-03-26 PROCEDURE — 99283 EMERGENCY DEPT VISIT LOW MDM: CPT

## 2024-03-26 PROCEDURE — 71046 X-RAY EXAM CHEST 2 VIEWS: CPT

## 2024-04-02 ENCOUNTER — APPOINTMENT (OUTPATIENT)
Dept: URGENT CARE | Age: 39
End: 2024-04-02
Payer: OTHER MISCELLANEOUS

## 2024-04-02 PROCEDURE — 99213 OFFICE O/P EST LOW 20 MIN: CPT

## 2024-07-08 ENCOUNTER — APPOINTMENT (OUTPATIENT)
Dept: RADIOLOGY | Age: 39
End: 2024-07-08
Payer: COMMERCIAL

## 2024-07-08 ENCOUNTER — OCCMED (OUTPATIENT)
Dept: URGENT CARE | Age: 39
End: 2024-07-08
Payer: OTHER MISCELLANEOUS

## 2024-07-08 DIAGNOSIS — S39.012A STRAIN OF MUSCLE, FASCIA AND TENDON OF LOWER BACK, INITIAL ENCOUNTER: Primary | ICD-10-CM

## 2024-07-08 DIAGNOSIS — S39.012A STRAIN OF MUSCLE, FASCIA AND TENDON OF LOWER BACK, INITIAL ENCOUNTER: ICD-10-CM

## 2024-07-08 PROCEDURE — 72100 X-RAY EXAM L-S SPINE 2/3 VWS: CPT

## 2024-07-08 PROCEDURE — G0382 LEV 3 HOSP TYPE B ED VISIT: HCPCS

## 2024-07-08 PROCEDURE — 99283 EMERGENCY DEPT VISIT LOW MDM: CPT

## 2024-07-15 ENCOUNTER — HOSPITAL ENCOUNTER (EMERGENCY)
Facility: HOSPITAL | Age: 39
Discharge: HOME | End: 2024-07-15
Attending: EMERGENCY MEDICINE
Payer: COMMERCIAL

## 2024-07-15 VITALS
SYSTOLIC BLOOD PRESSURE: 126 MMHG | DIASTOLIC BLOOD PRESSURE: 79 MMHG | TEMPERATURE: 99.6 F | HEART RATE: 93 BPM | OXYGEN SATURATION: 96 % | RESPIRATION RATE: 16 BRPM

## 2024-07-15 DIAGNOSIS — S39.012A STRAIN OF LUMBAR REGION, INITIAL ENCOUNTER: Primary | ICD-10-CM

## 2024-07-15 PROCEDURE — 99284 EMERGENCY DEPT VISIT MOD MDM: CPT | Performed by: EMERGENCY MEDICINE

## 2024-07-15 PROCEDURE — 99282 EMERGENCY DEPT VISIT SF MDM: CPT

## 2024-07-15 RX ORDER — METHOCARBAMOL 500 MG/1
500 TABLET, FILM COATED ORAL 2 TIMES DAILY
Qty: 14 TABLET | Refills: 0 | Status: SHIPPED | OUTPATIENT
Start: 2024-07-15 | End: 2024-07-22

## 2024-07-15 RX ORDER — LIDOCAINE 50 MG/G
2 PATCH TOPICAL ONCE
Status: DISCONTINUED | OUTPATIENT
Start: 2024-07-15 | End: 2024-07-15 | Stop reason: HOSPADM

## 2024-07-15 RX ORDER — KETOROLAC TROMETHAMINE 30 MG/ML
15 INJECTION, SOLUTION INTRAMUSCULAR; INTRAVENOUS ONCE
Status: DISCONTINUED | OUTPATIENT
Start: 2024-07-15 | End: 2024-07-15 | Stop reason: HOSPADM

## 2024-07-15 RX ORDER — ACETAMINOPHEN 325 MG/1
650 TABLET ORAL ONCE
Status: COMPLETED | OUTPATIENT
Start: 2024-07-15 | End: 2024-07-15

## 2024-07-15 RX ADMIN — ACETAMINOPHEN 650 MG: 325 TABLET, FILM COATED ORAL at 09:35

## 2024-07-15 RX ADMIN — LIDOCAINE 2 PATCH: 50 PATCH CUTANEOUS at 09:36

## 2024-07-15 NOTE — ED ATTENDING ATTESTATION
7/15/2024  I, Jacobo Franks DO, saw and evaluated the patient. I have discussed the patient with the resident/non-physician practitioner and agree with the resident's/non-physician practitioner's findings, Plan of Care, and MDM as documented in the resident's/non-physician practitioner's note, except where noted. All available labs and Radiology studies were reviewed.  I was present for key portions of any procedure(s) performed by the resident/non-physician practitioner and I was immediately available to provide assistance.       At this point I agree with the current assessment done in the Emergency Department.  I have conducted an independent evaluation of this patient a history and physical is as follows:    Patient is a 38-year-old male with history of asthma, increased BMI, on July 3 at work he was twisting, felt some low back discomfort.  Did not fall or have direct trauma, thought it would get better, was off for 4 July holiday, however his pain continued, he was seen through work by occupational medicine on July 7, reportedly had unremarkable x-rays, recommended to go back to work light duty, given a prescription for a muscle relaxer which she has not started or tried yet, as well as recommended use ibuprofen.  He has been occasionally using ibuprofen 400 mg, not consistently, has an appointment in 2 days for follow-up with occupational medicine, but presents today due to the pain not going away.  He has no fever, no chills, no bladder or bowel incontinence, no saddle anesthesia, no leg weakness, no IV drug use, no previous back surgery or back injections.    General:  Patient is well-appearing  Head:  Atraumatic  Eyes:  Conjunctiva pink  ENT:  Mucous membranes are moist  Neck:  Supple  Cardiac:  S1-S2, without murmurs  Lungs:  Clear to auscultation bilaterally  Abdomen:  Soft, nontender, normal bowel sounds, no CVA tenderness  Extremities:  Normal range of motion   Back: No warmth or redness to the  back.  There is no CVA tenderness, no spinal tenderness, no warmth or fluctuance.  Neurologic:  Awake, fluent speech, normal comprehension, sensation intact and symmetric in the b/l  legs. Strength is 5/5 at the bilateral hips, knees, ankles, reflexes are 2/4 at the bilateral knees and ankles.  Can dorsiflex & plantarflex great toes bilaterally without difficulty, no saddle anesthesia, negative straight leg raise bilaterally. AAOx3  Skin:  Pink warm and dry, no rash  Psychiatric:  Alert, pleasant, cooperative      ED Course     Based on the patient's history and physical exam findings, I do not find any evidence of neurosurgical emergency or need for emergent imaging studies as there is no bladder or bowel incontinence, no saddle anesthesia, and no significant neurologic abnormalities.There is no evidence of cauda equina syndrome, The patient is afebrile, has no significant vertebral tenderness or fluctuance, and has no risk factors for spinal abscess such as IV drug use, significant trauma, or recent spinal injections. I believe it is appropriate for the patient to be discharged and managed conservatively as an outpatient.It was discussed with the patient that this may be the early presentation of a more significant problem, and if they notice any of the signs/symptoms in the discharge instruction sheets, or they are otherwise concerned about their medical condition, they should return to the nearest emergency department.       Critical Care Time  Procedures

## 2024-07-15 NOTE — DISCHARGE INSTRUCTIONS
You have been seen in the emergency department for evaluation of back pain.  We were able to control your pain with medications today and prescribed you a muscle relaxer.  Please take this as needed and do not operate heavy machinery after taking.  Please return to the ED if you develop new or worsening symptoms.  Otherwise, please follow-up as directed with Worker's Comp.

## 2024-07-15 NOTE — Clinical Note
Isai James was seen and treated in our emergency department on 7/15/2024.                Diagnosis:     Isai  .    He may return on this date: 07/16/2024         If you have any questions or concerns, please don't hesitate to call.      Fela Braxton MD    ______________________________           _______________          _______________  Hospital Representative                              Date                                Time

## 2024-07-17 ENCOUNTER — APPOINTMENT (OUTPATIENT)
Dept: URGENT CARE | Age: 39
End: 2024-07-17
Payer: OTHER MISCELLANEOUS

## 2024-07-17 PROCEDURE — 99213 OFFICE O/P EST LOW 20 MIN: CPT

## 2024-07-17 NOTE — ED PROVIDER NOTES
History  Chief Complaint   Patient presents with    Back Pain     Lower back for about 2 weeks. Previously evaluated and given med's that are not working.      Patient is a 38-year-old male, past medical history significant for asthma, and recent back injury, presenting today for evaluation of back pain.  Patient describes back pain in the paraspinal muscles of his lower back.  He states that since the onset, pain has been relatively constant and unchanging.  Per patient, he injured his back approximately 2 weeks ago on 7/3.  He states that he hurt his back while he was lifting something heavy at work.  Patient followed up with his Worker's Compensation and was seen and initially evaluated on 7/8.  He states that he had x-rays done at the time, which did not show any abnormalities.  He states that he is to follow-up again with them in 2 days.  However, he is experiencing significant pain and is here today for reevaluation and pain control.  Patient states that he has been taking Tylenol and Motrin at home, which give him limited relief.  He states that they did prescribe him a muscle relaxer, however he never filled the prescription as he is hesitant to take this medication.  Patient denies associated symptoms such as urinary or bowel incontinence or retention, saddle anesthesia, numbness/weakness/paresthesias in the lower extremities.  He denies any midline pain, abdominal pain, or chest pain. Pain does not radiate down the leg.  Additionally denies fevers, IV drug use, history of cancers, history of recent weight loss.          Prior to Admission Medications   Prescriptions Last Dose Informant Patient Reported? Taking?   Fluticasone-Salmeterol (Advair) 100-50 mcg/dose inhaler  Self No No   Sig: Inhale 1 puff every 12 (twelve) hours Rinse mouth after use.   LORazepam (ATIVAN) 1 mg tablet  Self No No   Sig: Take 1 tablet (1 mg total) by mouth 30 min pre-procedure   albuterol (PROVENTIL HFA,VENTOLIN HFA) 90 mcg/act  inhaler   No No   Sig: Inhale 2 puffs every 6 (six) hours as needed for wheezing      Facility-Administered Medications: None       Past Medical History:   Diagnosis Date    Acute on chronic respiratory failure with hypercapnia (HCC)     Asthma     Morbid obesity (HCC)     Nocturnal hypoxemia        No past surgical history on file.    No family history on file.  I have reviewed and agree with the history as documented.    E-Cigarette/Vaping    E-Cigarette Use Current Some Day User     Start Date 22     Comments 3% nicotine      E-Cigarette/Vaping Substances    Nicotine Yes     Flavoring Yes      Social History     Tobacco Use    Smoking status: Former     Current packs/day: 0.00     Average packs/day: 1.5 packs/day for 10.5 years (15.7 ttl pk-yrs)     Types: Cigarettes     Start date: 2012     Quit date: 2022     Years since quittin.0    Smokeless tobacco: Never    Tobacco comments:     Has stopped smoking for about a month, since his hospitalization   Vaping Use    Vaping status: Some Days    Start date: 2022    Substances: Nicotine, Flavoring   Substance Use Topics    Alcohol use: Not Currently     Comment: rarely    Drug use: Not Currently        Review of Systems    Physical Exam  ED Triage Vitals [07/15/24 0837]   Temperature Pulse Respirations Blood Pressure SpO2   99.6 °F (37.6 °C) 93 16 126/79 96 %      Temp Source Heart Rate Source Patient Position - Orthostatic VS BP Location FiO2 (%)   Tympanic Monitor Sitting Left arm --      Pain Score       8             Orthostatic Vital Signs  Vitals:    07/15/24 0837   BP: 126/79   Pulse: 93   Patient Position - Orthostatic VS: Sitting       Physical Exam  Vitals and nursing note reviewed.   Constitutional:       General: He is not in acute distress.     Appearance: Normal appearance. He is not ill-appearing or toxic-appearing.   HENT:      Head: Normocephalic and atraumatic.   Eyes:      General: No scleral icterus.     Extraocular Movements:  Extraocular movements intact.   Cardiovascular:      Rate and Rhythm: Normal rate and regular rhythm.      Pulses: Normal pulses.      Heart sounds: Normal heart sounds. No murmur heard.  Pulmonary:      Effort: Pulmonary effort is normal. No respiratory distress.      Breath sounds: Normal breath sounds. No wheezing or rhonchi.   Abdominal:      General: Abdomen is flat. There is no distension.      Palpations: Abdomen is soft.      Tenderness: There is no abdominal tenderness.   Musculoskeletal:         General: No swelling, tenderness or deformity.      Cervical back: Normal and normal range of motion.      Thoracic back: Normal.      Lumbar back: No swelling, spasms, tenderness or bony tenderness. Normal range of motion. Positive right straight leg raise test. Negative left straight leg raise test.      Right lower leg: No edema.      Left lower leg: No edema.   Skin:     Capillary Refill: Capillary refill takes less than 2 seconds.   Neurological:      General: No focal deficit present.      Mental Status: He is alert.      Cranial Nerves: No cranial nerve deficit.      Sensory: Sensation is intact. No sensory deficit.      Motor: Motor function is intact. No weakness.      Gait: Gait is intact. Gait normal.      Deep Tendon Reflexes:      Reflex Scores:       Patellar reflexes are 2+ on the right side and 2+ on the left side.  Psychiatric:         Mood and Affect: Mood normal.         Behavior: Behavior normal.         ED Medications  Medications   acetaminophen (TYLENOL) tablet 650 mg (650 mg Oral Given 7/15/24 0935)       Diagnostic Studies  Results Reviewed       None                   No orders to display         Procedures  Procedures      ED Course  ED Course as of 07/16/24 2043   Mon Jul 15, 2024   0900 Patient seen and evaluated by me  DDx: Lumbar strain.  Not concerned for fracture as patient has had negative workup, and is not tender.  Describes pain more in the muscles rather than in the bone.  Less  concerned for herniated disc for the same reason, as I would expect the location of the pain to be more midline.  Patient does not have any radiation of pain down the legs to suggest sciatica.  He does not have any red flag symptoms to suggest cauda equina, spinal epidural abscess, metastatic disease, or infectious process.  Workup and plan: No indication for further imaging at this time, will plan for pain control and discharge with follow-up on Wednesday with Worker's Compensation.                             SBIRT 20yo+      Flowsheet Row Most Recent Value   Initial Alcohol Screen: US AUDIT-C     1. How often do you have a drink containing alcohol? 0 Filed at: 07/15/2024 0839   2. How many drinks containing alcohol do you have on a typical day you are drinking?  0 Filed at: 07/15/2024 0839   3a. Male UNDER 65: How often do you have five or more drinks on one occasion? 0 Filed at: 07/15/2024 0839   3b. FEMALE Any Age, or MALE 65+: How often do you have 4 or more drinks on one occassion? 0 Filed at: 07/15/2024 0839   Audit-C Score 0 Filed at: 07/15/2024 0839   KEDAR: How many times in the past year have you...    Used an illegal drug or used a prescription medication for non-medical reasons? Never Filed at: 07/15/2024 0839                  Medical Decision Making  See ED course above regarding details of the MDM    Risk  OTC drugs.  Prescription drug management.          Disposition  Final diagnoses:   Strain of lumbar region, initial encounter     Time reflects when diagnosis was documented in both MDM as applicable and the Disposition within this note       Time User Action Codes Description Comment    7/15/2024  9:20 AM Fela Braxton Add [S39.012A] Strain of lumbar region, initial encounter           ED Disposition       ED Disposition   Discharge    Condition   Stable    Date/Time   Mon Jul 15, 2024 0932    Comment   Isai James discharge to home/self care.                   Follow-up Information    None          Discharge Medication List as of 7/15/2024  9:33 AM        START taking these medications    Details   methocarbamol (ROBAXIN) 500 mg tablet Take 1 tablet (500 mg total) by mouth 2 (two) times a day for 7 days, Starting Mon 7/15/2024, Until Mon 7/22/2024, Normal           CONTINUE these medications which have NOT CHANGED    Details   albuterol (PROVENTIL HFA,VENTOLIN HFA) 90 mcg/act inhaler Inhale 2 puffs every 6 (six) hours as needed for wheezing, Starting Fri 2/2/2024, Normal      Fluticasone-Salmeterol (Advair) 100-50 mcg/dose inhaler Inhale 1 puff every 12 (twelve) hours Rinse mouth after use., Starting Fri 1/13/2023, Normal      LORazepam (ATIVAN) 1 mg tablet Take 1 tablet (1 mg total) by mouth 30 min pre-procedure, Starting Tue 10/31/2023, Normal           No discharge procedures on file.    PDMP Review         Value Time User    PDMP Reviewed  Yes 10/31/2023  3:43 PM Javi Barry PA-C             ED Provider  Attending physically available and evaluated Isai James. I managed the patient along with the ED Attending.    Electronically Signed by           Fela Braxton MD  07/16/24 2044

## 2024-07-24 ENCOUNTER — APPOINTMENT (OUTPATIENT)
Dept: URGENT CARE | Age: 39
End: 2024-07-24
Payer: OTHER MISCELLANEOUS

## 2024-07-24 PROCEDURE — 99213 OFFICE O/P EST LOW 20 MIN: CPT

## 2024-07-31 ENCOUNTER — APPOINTMENT (OUTPATIENT)
Dept: URGENT CARE | Age: 39
End: 2024-07-31
Payer: OTHER MISCELLANEOUS

## 2024-07-31 PROCEDURE — 99213 OFFICE O/P EST LOW 20 MIN: CPT

## 2024-09-21 DIAGNOSIS — L03.019 PARONYCHIA OF FINGER: ICD-10-CM

## 2024-09-23 RX ORDER — ALBUTEROL SULFATE 90 UG/1
INHALANT RESPIRATORY (INHALATION)
Qty: 6.7 G | Refills: 0 | Status: SHIPPED | OUTPATIENT
Start: 2024-09-23

## 2024-09-23 NOTE — TELEPHONE ENCOUNTER
Scheduled patient with Dr. Mathis on 9/27/24. Dr. Hemphill did not have availability.    Please reschedule if needed.  Thank you!!

## 2024-12-09 ENCOUNTER — APPOINTMENT (EMERGENCY)
Dept: RADIOLOGY | Facility: HOSPITAL | Age: 39
End: 2024-12-09
Payer: COMMERCIAL

## 2024-12-09 ENCOUNTER — HOSPITAL ENCOUNTER (EMERGENCY)
Facility: HOSPITAL | Age: 39
Discharge: HOME | End: 2024-12-09
Attending: EMERGENCY MEDICINE
Payer: COMMERCIAL

## 2024-12-09 VITALS
OXYGEN SATURATION: 98 % | TEMPERATURE: 99 F | SYSTOLIC BLOOD PRESSURE: 117 MMHG | HEART RATE: 72 BPM | RESPIRATION RATE: 17 BRPM | DIASTOLIC BLOOD PRESSURE: 72 MMHG

## 2024-12-09 DIAGNOSIS — G89.29 CHRONIC SCAPULAR PAIN: Primary | ICD-10-CM

## 2024-12-09 DIAGNOSIS — M89.8X1 CHRONIC SCAPULAR PAIN: Primary | ICD-10-CM

## 2024-12-09 LAB
ANION GAP SERPL CALCULATED.3IONS-SCNC: 8 MMOL/L (ref 4–13)
ATRIAL RATE: 82 BPM
BASOPHILS # BLD AUTO: 0.02 THOUSANDS/ÂΜL (ref 0–0.1)
BASOPHILS NFR BLD AUTO: 0 % (ref 0–1)
BUN SERPL-MCNC: 15 MG/DL (ref 5–25)
CALCIUM SERPL-MCNC: 8.7 MG/DL (ref 8.4–10.2)
CARDIAC TROPONIN I PNL SERPL HS: <2 NG/L (ref ?–50)
CARDIAC TROPONIN I PNL SERPL HS: <2 NG/L (ref ?–50)
CHLORIDE SERPL-SCNC: 105 MMOL/L (ref 96–108)
CO2 SERPL-SCNC: 27 MMOL/L (ref 21–32)
CREAT SERPL-MCNC: 1.15 MG/DL (ref 0.6–1.3)
EOSINOPHIL # BLD AUTO: 0.11 THOUSAND/ÂΜL (ref 0–0.61)
EOSINOPHIL NFR BLD AUTO: 2 % (ref 0–6)
ERYTHROCYTE [DISTWIDTH] IN BLOOD BY AUTOMATED COUNT: 14 % (ref 11.6–15.1)
GFR SERPL CREATININE-BSD FRML MDRD: 80 ML/MIN/1.73SQ M
GLUCOSE SERPL-MCNC: 97 MG/DL (ref 65–140)
HCT VFR BLD AUTO: 42.2 % (ref 36.5–49.3)
HGB BLD-MCNC: 13.4 G/DL (ref 12–17)
IMM GRANULOCYTES # BLD AUTO: 0 THOUSAND/UL (ref 0–0.2)
IMM GRANULOCYTES NFR BLD AUTO: 0 % (ref 0–2)
LYMPHOCYTES # BLD AUTO: 1.55 THOUSANDS/ÂΜL (ref 0.6–4.47)
LYMPHOCYTES NFR BLD AUTO: 22 % (ref 14–44)
MCH RBC QN AUTO: 26.2 PG (ref 26.8–34.3)
MCHC RBC AUTO-ENTMCNC: 31.8 G/DL (ref 31.4–37.4)
MCV RBC AUTO: 83 FL (ref 82–98)
MONOCYTES # BLD AUTO: 0.63 THOUSAND/ÂΜL (ref 0.17–1.22)
MONOCYTES NFR BLD AUTO: 9 % (ref 4–12)
NEUTROPHILS # BLD AUTO: 4.82 THOUSANDS/ÂΜL (ref 1.85–7.62)
NEUTS SEG NFR BLD AUTO: 67 % (ref 43–75)
NRBC BLD AUTO-RTO: 0 /100 WBCS
P AXIS: 47 DEGREES
PLATELET # BLD AUTO: 158 THOUSANDS/UL (ref 149–390)
PMV BLD AUTO: 10.8 FL (ref 8.9–12.7)
POTASSIUM SERPL-SCNC: 3.6 MMOL/L (ref 3.5–5.3)
PR INTERVAL: 156 MS
QRS AXIS: 49 DEGREES
QRSD INTERVAL: 84 MS
QT INTERVAL: 352 MS
QTC INTERVAL: 411 MS
RBC # BLD AUTO: 5.11 MILLION/UL (ref 3.88–5.62)
SODIUM SERPL-SCNC: 140 MMOL/L (ref 135–147)
T WAVE AXIS: 31 DEGREES
VENTRICULAR RATE: 82 BPM
WBC # BLD AUTO: 7.13 THOUSAND/UL (ref 4.31–10.16)

## 2024-12-09 PROCEDURE — 99285 EMERGENCY DEPT VISIT HI MDM: CPT | Performed by: EMERGENCY MEDICINE

## 2024-12-09 PROCEDURE — 93005 ELECTROCARDIOGRAM TRACING: CPT

## 2024-12-09 PROCEDURE — 80048 BASIC METABOLIC PNL TOTAL CA: CPT

## 2024-12-09 PROCEDURE — 85025 COMPLETE CBC W/AUTO DIFF WBC: CPT

## 2024-12-09 PROCEDURE — 71046 X-RAY EXAM CHEST 2 VIEWS: CPT

## 2024-12-09 PROCEDURE — 36415 COLL VENOUS BLD VENIPUNCTURE: CPT

## 2024-12-09 PROCEDURE — 99285 EMERGENCY DEPT VISIT HI MDM: CPT

## 2024-12-09 PROCEDURE — 84484 ASSAY OF TROPONIN QUANT: CPT

## 2024-12-09 RX ORDER — IBUPROFEN 600 MG/1
600 TABLET, FILM COATED ORAL EVERY 6 HOURS PRN
Status: DISCONTINUED | OUTPATIENT
Start: 2024-12-09 | End: 2024-12-09 | Stop reason: HOSPADM

## 2024-12-09 RX ADMIN — IBUPROFEN 600 MG: 600 TABLET, FILM COATED ORAL at 15:34

## 2024-12-09 NOTE — Clinical Note
Isai James was seen and treated in our emergency department on 12/9/2024.                Diagnosis:     Isai  .    He may return on this date:     Please excuse Isai from work on December 9.     If you have any questions or concerns, please don't hesitate to call.      Guerline Hood, DO    ______________________________           _______________          _______________  Hospital Representative                              Date                                Time

## 2024-12-09 NOTE — ED PROVIDER NOTES
Time reflects when diagnosis was documented in both MDM as applicable and the Disposition within this note       Time User Action Codes Description Comment    12/9/2024  5:40 PM Guerline Hood Add [M89.8X1,  G89.29] Chronic scapular pain     12/9/2024  5:40 PM Guerline Hood Modify [M89.8X1,  G89.29] scapular pain           ED Disposition       ED Disposition   Discharge    Condition   Stable    Date/Time   Mon Dec 9, 2024  5:42 PM    Comment   Isai James discharge to home/self care.                   Assessment & Plan       Medical Decision Making  Patient is a 39 y.o. male with PMH of asthma and sleep apnea who presents to the ED with left scapular pain.    Vital signs within normal limits. On exam patient has normal cardiac exam and has point tenderness on left scapula.    History and physical exam most consistent with muscular sprain. However, differential diagnosis included but not limited to ACS, PE, pneumothorax all of which are unlikely given the history and location of pain in the left scapula.  His heart score is 1 with negative troponins.     Plan: CBC, CMP, troponin, chest x-ray to evaluate for pneumothorax or trauma, ibuprofen for symptom control, EKG for ischemic evaluation    No evidence of pneumothorax or osseous abnormality noted on chest x-ray on my interpretation.    View ED course above for further discussion on patient workup.     On review of previous records patient was diagnosed with sleep apnea and asthma.    All labs reviewed and utilized in the medical decision making process  All radiology studies independently viewed by me and interpreted by the radiologist.  I reviewed all testing with the patient.     Upon re-evaluation patient's pain feels improved.  He no longer has any anterior chest pain.  Explained testing results to patient and he agrees and understands.  Explained symptomatic control.  All questions answered.  He will follow-up with his primary care doctor.  He was given  "strict return precautions..    Disposition: Stable discharge    Portions of the record may have been created with voice recognition software. Occasional wrong word or \"sound a like\" substitutions may have occurred due to the inherent limitations of voice recognition software. Read the chart carefully and recognize, using context, where substitutions have occurred.      Amount and/or Complexity of Data Reviewed  Labs: ordered.  Radiology: ordered and independent interpretation performed.    Risk  Prescription drug management.        ED Course as of 12/09/24 1828   Mon Dec 09, 2024   1448 EKG interpreted by me normal sinus rhythm 82 bpm with normal intervals, normal axis, no ST elevations or depressions, no T wave inversions, when compared to EKG from April 25, 2022 no significant change       Medications   ibuprofen (MOTRIN) tablet 600 mg (600 mg Oral Given 12/9/24 1534)       ED Risk Strat Scores   HEART Risk Score      Flowsheet Row Most Recent Value   Heart Score Risk Calculator    History 0 Filed at: 12/09/2024 1738   ECG 0 Filed at: 12/09/2024 1738   Age 0 Filed at: 12/09/2024 1738   Risk Factors 1 Filed at: 12/09/2024 1738   Troponin 0 Filed at: 12/09/2024 1738   HEART Score 1 Filed at: 12/09/2024 1738                               SBIRT 22yo+      Flowsheet Row Most Recent Value   Initial Alcohol Screen: US AUDIT-C     1. How often do you have a drink containing alcohol? 0 Filed at: 12/09/2024 1424   2. How many drinks containing alcohol do you have on a typical day you are drinking?  0 Filed at: 12/09/2024 1424   3a. Male UNDER 65: How often do you have five or more drinks on one occasion? 0 Filed at: 12/09/2024 1424   Audit-C Score 0 Filed at: 12/09/2024 1424   KEDAR: How many times in the past year have you...    Used an illegal drug or used a prescription medication for non-medical reasons? Never Filed at: 12/09/2024 1424                            History of Present Illness       Chief Complaint   Patient " presents with    Chest Pain     Pt c/o L sided chest pain that radiates into L arm starting at approx 1300. Denies SOB       Past Medical History:   Diagnosis Date    Acute on chronic respiratory failure with hypercapnia (HCC)     Asthma     Morbid obesity (HCC)     Nocturnal hypoxemia       History reviewed. No pertinent surgical history.   History reviewed. No pertinent family history.   Social History     Tobacco Use    Smoking status: Former     Current packs/day: 0.00     Average packs/day: 1.5 packs/day for 10.5 years (15.7 ttl pk-yrs)     Types: Cigarettes     Start date: 2012     Quit date: 2022     Years since quittin.4    Smokeless tobacco: Never    Tobacco comments:     Has stopped smoking for about a month, since his hospitalization   Vaping Use    Vaping status: Some Days    Start date: 2022    Substances: Nicotine, Flavoring   Substance Use Topics    Alcohol use: Not Currently     Comment: rarely    Drug use: Not Currently      E-Cigarette/Vaping    E-Cigarette Use Current Some Day User     Start Date 22     Comments 3% nicotine       E-Cigarette/Vaping Substances    Nicotine Yes     Flavoring Yes       I have reviewed and agree with the history as documented.     39-year-old male past medical history of asthma and sleep apnea presents for left scapular pain that started today at work about an hour prior to presentation.  He describes it as a burning sensation.  It is not exacerbated or improved by any sort of movement.  He has not tried anything to alleviate the pain such as NSAIDs.  He then noticed a patch of numbness on his left anterior chest which prompted him to call EMS.  He was given aspirin and nitroglycerin with relief of pain.  He has never experienced this symptoms before.  The scapular pain initially was a 7 out of 10 but has improved to a 3 out of 10.  The numbness on his chest has resolved.  He does lift boxes at work but does not recall a specific incident at onset  of pain.  Denies nausea, vomiting, diaphoresis, chest pain, shortness of breath, abdominal pain, diarrhea.  He has not had any recent travel, hospitalizations.  No family or personal history of PE.      Chest Pain  Associated symptoms: numbness    Associated symptoms: no abdominal pain, no back pain, no cough, no dizziness, no fever, no headache, no nausea, no palpitations, no shortness of breath, not vomiting and no weakness        Review of Systems   Constitutional:  Negative for chills and fever.   HENT:  Negative for ear pain and sore throat.    Eyes:  Negative for pain and visual disturbance.   Respiratory:  Negative for cough and shortness of breath.    Cardiovascular:  Negative for chest pain and palpitations.   Gastrointestinal:  Negative for abdominal pain, constipation, diarrhea, nausea and vomiting.   Genitourinary:  Negative for dysuria and hematuria.   Musculoskeletal:  Positive for myalgias. Negative for arthralgias, back pain, neck pain and neck stiffness.   Skin:  Negative for color change, pallor, rash and wound.   Neurological:  Positive for numbness. Negative for dizziness, seizures, syncope, weakness, light-headedness and headaches.   All other systems reviewed and are negative.          Objective       ED Triage Vitals   Temperature Pulse Blood Pressure Respirations SpO2 Patient Position - Orthostatic VS   12/09/24 1423 12/09/24 1423 12/09/24 1423 12/09/24 1423 12/09/24 1423 12/09/24 1700   99 °F (37.2 °C) 84 125/61 18 98 % Lying      Temp src Heart Rate Source BP Location FiO2 (%) Pain Score    -- 12/09/24 1700 12/09/24 1700 -- 12/09/24 1534     Monitor Right arm  7      Vitals      Date and Time Temp Pulse SpO2 Resp BP Pain Score FACES Pain Rating User   12/09/24 1700 -- 72 98 % 17 117/72 -- -- JS   12/09/24 1552 -- 80 93 % 20 -- -- -- AR   12/09/24 1534 -- -- -- -- -- 7 --    12/09/24 1423 99 °F (37.2 °C) 84 98 % 18 125/61 -- -- [place]            Physical Exam  Vitals and nursing note reviewed.    Constitutional:       General: He is not in acute distress.     Appearance: He is well-developed.   HENT:      Head: Normocephalic and atraumatic.   Eyes:      Conjunctiva/sclera: Conjunctivae normal.   Cardiovascular:      Rate and Rhythm: Normal rate and regular rhythm.      Heart sounds: Normal heart sounds. No murmur heard.  Pulmonary:      Effort: Pulmonary effort is normal. No respiratory distress.      Breath sounds: Normal breath sounds. No stridor, decreased air movement or transmitted upper airway sounds.   Chest:      Comments: Sensation intact, no swelling, tenderness, rashes, erythema  Abdominal:      Palpations: Abdomen is soft.      Tenderness: There is no abdominal tenderness.   Musculoskeletal:         General: No swelling.      Cervical back: Neck supple.        Back:       Comments: Point tenderness on left scapular spine, sensation intact, back is symmetrical, full range of motion of bilateral upper extremities without exacerbation of pain, 5 out of 5 strength   Skin:     General: Skin is warm and dry.      Capillary Refill: Capillary refill takes less than 2 seconds.      Findings: No rash.   Neurological:      Mental Status: He is alert.   Psychiatric:         Mood and Affect: Mood normal.         Results Reviewed       Procedure Component Value Units Date/Time    HS Troponin I 2hr [[id number]] Collected: 12/09/24 1652    Lab Status: Final result Specimen: Blood from Arm, Left Updated: 12/09/24 1726     hs TnI 2hr <2 ng/L      Delta 2hr hsTnI --    HS Troponin I 4hr [[id number]]     Lab Status: No result Specimen: Blood     HS Troponin 0hr (reflex protocol) [[id number]]  (Normal) Collected: 12/09/24 1501    Lab Status: Final result Specimen: Blood from Arm, Left Updated: 12/09/24 1538     hs TnI 0hr <2 ng/L     Basic metabolic panel [[id number]] Collected: 12/09/24 1501    Lab Status: Final result Specimen: Blood from Arm, Left Updated: 12/09/24 1533     Sodium 140 mmol/L      Potassium 3.6  mmol/L      Chloride 105 mmol/L      CO2 27 mmol/L      ANION GAP 8 mmol/L      BUN 15 mg/dL      Creatinine 1.15 mg/dL      Glucose 97 mg/dL      Calcium 8.7 mg/dL      eGFR 80 ml/min/1.73sq m     Narrative:      [employer] guidelines for Chronic Kidney Disease (CKD):     Stage 1 with normal or high GFR (GFR > 90 mL/min/1.73 square meters)    Stage 2 Mild CKD (GFR = 60-89 mL/min/1.73 square meters)    Stage 3A Moderate CKD (GFR = 45-59 mL/min/1.73 square meters)    Stage 3B Moderate CKD (GFR = 30-44 mL/min/1.73 square meters)    Stage 4 Severe CKD (GFR = 15-29 mL/min/1.73 square meters)    Stage 5 End Stage CKD (GFR <15 mL/min/1.73 square meters)  Note: GFR calculation is accurate only with a steady state creatinine    CBC and differential [[id number]]  (Abnormal) Collected: 12/09/24 1501    Lab Status: Final result Specimen: Blood from Arm, Left Updated: 12/09/24 1515     WBC 7.13 Thousand/uL      RBC 5.11 Million/uL      Hemoglobin 13.4 g/dL      Hematocrit 42.2 %      MCV 83 fL      MCH 26.2 pg      MCHC 31.8 g/dL      RDW 14.0 %      MPV 10.8 fL      Platelets 158 Thousands/uL      nRBC 0 /100 WBCs      Segmented % 67 %      Immature Grans % 0 %      Lymphocytes % 22 %      Monocytes % 9 %      Eosinophils Relative 2 %      Basophils Relative 0 %      Absolute Neutrophils 4.82 Thousands/µL      Absolute Immature Grans 0.00 Thousand/uL      Absolute Lymphocytes 1.55 Thousands/µL      Absolute Monocytes 0.63 Thousand/µL      Eosinophils Absolute 0.11 Thousand/µL      Basophils Absolute 0.02 Thousands/µL             XR chest 2 views   ED Interpretation by Jacobo Franks DO (12/09 1624)   2 view chest x-ray interpreted by me shows no infiltrate, no pneumothorax, no acute cardiopulmonary disease no acute change from March 26, 2024          Procedures    ED Medication and Procedure Management   Prior to Admission Medications   Prescriptions Last Dose Informant Patient Reported? Taking?    Fluticasone-Salmeterol (Advair) 100-50 mcg/dose inhaler  Self No No   Sig: Inhale 1 puff every 12 (twelve) hours Rinse mouth after use.   LORazepam (ATIVAN) 1 mg tablet  Self No No   Sig: Take 1 tablet (1 mg total) by mouth 30 min pre-procedure   albuterol (PROVENTIL HFA,VENTOLIN HFA) 90 mcg/act inhaler   No No   Sig: INHALE 2 PUFFS EVERY 6 HOURS AS NEEDED FOR WHEEZING   methocarbamol (ROBAXIN) 500 mg tablet   No No   Sig: Take 1 tablet (500 mg total) by mouth 2 (two) times a day for 7 days      Facility-Administered Medications: None     Discharge Medication List as of 12/9/2024  5:42 PM        CONTINUE these medications which have NOT CHANGED    Details   albuterol (PROVENTIL HFA,VENTOLIN HFA) 90 mcg/act inhaler INHALE 2 PUFFS EVERY 6 HOURS AS NEEDED FOR WHEEZING, Normal      Fluticasone-Salmeterol (Advair) 100-50 mcg/dose inhaler Inhale 1 puff every 12 (twelve) hours Rinse mouth after use., Starting Fri 1/13/2023, Normal      LORazepam (ATIVAN) 1 mg tablet Take 1 tablet (1 mg total) by mouth 30 min pre-procedure, Starting Tue 10/31/2023, Normal      methocarbamol (ROBAXIN) 500 mg tablet Take 1 tablet (500 mg total) by mouth 2 (two) times a day for 7 days, Starting Mon 7/15/2024, Until Mon 7/22/2024, Normal           No discharge procedures on file.  ED SEPSIS DOCUMENTATION   Time reflects when diagnosis was documented in both MDM as applicable and the Disposition within this note       Time User Action Codes Description Comment    12/9/2024  5:40 PM Guerline Hood Add [M89.8X1,  G89.29] Chronic scapular pain     12/9/2024  5:40 PM Guerline Hood Modify [M89.8X1,  G89.29] scapular pain                  Guerline Hood DO  12/09/24 1828

## 2024-12-09 NOTE — ED ATTENDING ATTESTATION
12/9/2024  I, Jacobo Franks DO, saw and evaluated the patient. I have discussed the patient with the resident/non-physician practitioner and agree with the resident's/non-physician practitioner's findings, Plan of Care, and MDM as documented in the resident's/non-physician practitioner's note, except where noted. All available labs and Radiology studies were reviewed.  I was present for key portions of any procedure(s) performed by the resident/non-physician practitioner and I was immediately available to provide assistance.       At this point I agree with the current assessment done in the Emergency Department.  I have conducted an independent evaluation of this patient a history and physical is as follows:    Patient is a 39-year-old male with a history of asthma, increased BMI, sleep apnea, says about 1 PM today while at home, cleaning up from a party he noticed some pain behind his left shoulder blade, folic a burning sensation.  It was not pleuritic in nature, not knifelike, ripping, or tearing, there is no associated chest pain, no diaphoresis, no nausea, it was worse with nothing and better with nothing.  He said he felt anxious about the pain and about a minute or 2 later began feeling some pressure on the left side from part of his chest.  That pain did not radiate either, was not pleuritic or knifelike or ripping or tearing.  He called EMS, given 324 mg of aspirin and several nitroglycerin and the left front part of the chest pain resolved but he still feels that slight burning sensation to his left shoulder blade.  Been no fall or trauma, he said he had similar type left shoulder blade pain happened about 3 months ago which lasted a few days and resolved, he said he did not seek medical treatment for that.  Patient denies any prolonged travel history, no recent long travel, no immobilizations, or hospitalizations.  No personal or family history of DVT or PE.  No family history of aortic dissection or  connective tissue disorders.    General:  Patient is well-appearing  Head:  Atraumatic  Eyes:  Conjunctiva pink  ENT:  Mucous membranes are moist  Neck:  Supple  Cardiac:  S1-S2, without murmurs  Lungs:  Clear to auscultation bilaterally, patient has no rash or tenderness to his chest  Abdomen:  Soft, nontender, normal bowel sounds, no CVA tenderness, no tympany, no rigidity, no guarding  Extremities:  Normal range of motion, no pedal edema or calf asymmetry, radial pulses are equal and symmetric bilaterally, no warmth or redness or swelling of either shoulder, he has normal, painless range of motion without limitations at the left shoulder and right shoulder  Back: The patient has no rash or warmth to the back.  There is no spinal tenderness.  He has no winging of either scapula, there is slight tenderness to palpation over the spine of the scapula on the left, movement of the patient's left shoulder or trunk does not reproduce his discomfort.  Neurologic:  Awake, fluent speech, normal comprehension, AAOx3, strength is 5 out of 5 in the bilateral shoulders, elbows, wrists, strengths are equal and symmetric bilaterally normal sensation throughout the entire head neck trunk and back.  Skin:  Pink warm and dry  Psychiatric:  Alert, pleasant, cooperative      ED Course  ED Course as of 12/09/24 1910   Mon Dec 09, 2024   1438 ECG performed at 1421 hrs., interpreted by me, sinus rhythm, rate of 82, no acute ischemic or infarctive changes, no acute change from June 12, 2022   1550 Patient reassessed, feeling comfortable, no change in the above findings, first troponin unremarkable, informed patient need to wait for delta troponin in 2 hours     XR chest 2 views   ED Interpretation   2 view chest x-ray interpreted by me shows no infiltrate, no pneumothorax, no acute cardiopulmonary disease no acute change from March 26, 2024        Labs Reviewed   CBC AND DIFFERENTIAL - Abnormal       Result Value Ref Range Status    WBC  "7.13  4.31 - 10.16 Thousand/uL Final    RBC 5.11  3.88 - 5.62 Million/uL Final    Hemoglobin 13.4  12.0 - 17.0 g/dL Final    Hematocrit 42.2  36.5 - 49.3 % Final    MCV 83  82 - 98 fL Final    MCH 26.2 (*) 26.8 - 34.3 pg Final    MCHC 31.8  31.4 - 37.4 g/dL Final    RDW 14.0  11.6 - 15.1 % Final    MPV 10.8  8.9 - 12.7 fL Final    Platelets 158  149 - 390 Thousands/uL Final    nRBC 0  /100 WBCs Final    Segmented % 67  43 - 75 % Final    Immature Grans % 0  0 - 2 % Final    Lymphocytes % 22  14 - 44 % Final    Monocytes % 9  4 - 12 % Final    Eosinophils Relative 2  0 - 6 % Final    Basophils Relative 0  0 - 1 % Final    Absolute Neutrophils 4.82  1.85 - 7.62 Thousands/µL Final    Absolute Immature Grans 0.00  0.00 - 0.20 Thousand/uL Final    Absolute Lymphocytes 1.55  0.60 - 4.47 Thousands/µL Final    Absolute Monocytes 0.63  0.17 - 1.22 Thousand/µL Final    Eosinophils Absolute 0.11  0.00 - 0.61 Thousand/µL Final    Basophils Absolute 0.02  0.00 - 0.10 Thousands/µL Final   HS TROPONIN I 0HR - Normal    hs TnI 0hr <2  \"Refer to ACS Flowchart\"- see link ng/L Final    Comment:                                              Initial (time 0) result  If >=50 ng/L, Myocardial injury suggested ;  Type of myocardial injury and treatment strategy  to be determined.  If 5-49 ng/L, a delta result at 2 hours and or 4 hours will be needed to further evaluate.  If <4 ng/L, and chest pain has been >3 hours since onset, patient may qualify for discharge based on the HEART score in the ED.  If <5 ng/L and <3hours since onset of chest pain, a delta result at 2 hours will be needed to further evaluate.    HS Troponin 99th Percentile URL of a Health Population=12 ng/L with a 95% Confidence Interval of 8-18 ng/L.    Second Troponin (time 2 hours)  If calculated delta >= 20 ng/L,  Myocardial injury suggested ; Type of myocardial injury and treatment strategy to be determined.  If 5-49 ng/L and the calculated delta is 5-19 ng/L, consult " "medical service for evaluation.  Continue evaluation for ischemia on ecg and other possible etiology and repeat hs troponin at 4 hours.  If delta is <5 ng/L at 2 hours, consider discharge based on risk stratification via the HEART score (if in ED), or FORTINO risk score in IP/Observation.    HS Troponin 99th Percentile URL of a Health Population=12 ng/L with a 95% Confidence Interval of 8-18 ng/L.   BASIC METABOLIC PANEL    Sodium 140  135 - 147 mmol/L Final    Potassium 3.6  3.5 - 5.3 mmol/L Final    Chloride 105  96 - 108 mmol/L Final    CO2 27  21 - 32 mmol/L Final    ANION GAP 8  4 - 13 mmol/L Final    BUN 15  5 - 25 mg/dL Final    Creatinine 1.15  0.60 - 1.30 mg/dL Final    Comment: Standardized to IDMS reference method    Glucose 97  65 - 140 mg/dL Final    Comment: If the patient is fasting, the ADA then defines impaired fasting glucose as > 100 mg/dL and diabetes as > or equal to 123 mg/dL.    Calcium 8.7  8.4 - 10.2 mg/dL Final    eGFR 80  ml/min/1.73sq m Final    Narrative:     National Kid[employer] guidelines for Chronic Kidney Disease (CKD):     Stage 1 with normal or high GFR (GFR > 90 mL/min/1.73 square meters)    Stage 2 Mild CKD (GFR = 60-89 mL/min/1.73 square meters)    Stage 3A Moderate CKD (GFR = 45-59 mL/min/1.73 square meters)    Stage 3B Moderate CKD (GFR = 30-44 mL/min/1.73 square meters)    Stage 4 Severe CKD (GFR = 15-29 mL/min/1.73 square meters)    Stage 5 End Stage CKD (GFR <15 mL/min/1.73 square meters)  Note: GFR calculation is accurate only with a steady state creatinine   HS TROPONIN I 2HR    hs TnI 2hr <2  \"Refer to ACS Flowchart\"- see link ng/L Final    Comment:                                              Initial (time 0) result  If >=50 ng/L, Myocardial injury suggested ;  Type of myocardial injury and treatment strategy  to be determined.  If 5-49 ng/L, a delta result at 2 hours and or 4 hours will be needed to further evaluate.  If <4 ng/L, and chest pain has been >3 " hours since onset, patient may qualify for discharge based on the HEART score in the ED.  If <5 ng/L and <3hours since onset of chest pain, a delta result at 2 hours will be needed to further evaluate.    HS Troponin 99th Percentile URL of a Health Population=12 ng/L with a 95% Confidence Interval of 8-18 ng/L.    Second Troponin (time 2 hours)  If calculated delta >= 20 ng/L,  Myocardial injury suggested ; Type of myocardial injury and treatment strategy to be determined.  If 5-49 ng/L and the calculated delta is 5-19 ng/L, consult medical service for evaluation.  Continue evaluation for ischemia on ecg and other possible etiology and repeat hs troponin at 4 hours.  If delta is <5 ng/L at 2 hours, consider discharge based on risk stratification via the HEART score (if in ED), or FORTINO risk score in IP/Observation.    HS Troponin 99th Percentile URL of a Health Population=12 ng/L with a 95% Confidence Interval of 8-18 ng/L.    Delta 2hr hsTnI     Final    Comment: Unable to Calculate     HEART Risk Score      Flowsheet Row Most Recent Value   Heart Score Risk Calculator    History 0 Filed at: 12/09/2024 1914   ECG 0 Filed at: 12/09/2024 1914   Age 0 Filed at: 12/09/2024 1914   Risk Factors 1 Filed at: 12/09/2024 1914   Troponin 0 Filed at: 12/09/2024 1914   HEART Score 1 Filed at: 12/09/2024 1914            This point the cause of the patient's symptoms is unclear but unlikely to represent an acute emergency.     Pulmonary embolism considered unlikey because  no immobilization of at least 3 days in the past month  no lower limb fracture in the past month  no recent prolonged travel or immobilization  no history of active malignancy  no hemoptysis  no personal or family history of VTE     Given the low likelihood of pulmonary embolism, I believe that the patient will more likely be harmed through false positive test results by further pursing the diagnosis of pulmonary embolism.    Aortic dissection considered  unlikely because  chest pain characteristic is not ripping, knife-like, or tearing  no radiation to the back or below the diaphragm  no neurological si/sx associated with chest pain  no recent events which would rapidly increase blood pressure (no coccaine or excessive stimulant use, no heavy weight lifting)  no known bicuspid aortic valve  no history of Esther-Danlos syndrome  radial pulses equal and symmetric  patient is not Marfanoid in appearance       ACS considered unlikely because  no diaphoresis  no nausea  per [facility] guidelines, with HEART score <4 and unremarkable delta high-sensitivity troponin, ACS considered unlikely and discharge recommended    Based on the above factors, I believe the patient is at low risk for major adverse events in the short-term. This has been discussed with the patient and management options have been reviewed. The patient verbalized an understand of this and was comfortable with discharge with outpatient work up.    DIAGNOSIS:  Acute nonspecific left scapular pain, acute nonspecific chest pain, history of anxiety    MEDICAL DECISION MAKING CODING    COLLECTION AND INTERPRETATION OF DATA  I reviewed prior external notes, including June 12, 2022 ECG, February 2, 2024 internal medicine office visit    I ordered each unique test  Tests reviewed personally by me:  ECG: See my ED course  Labs: See above  Imaging: I independently interpreted the chest x-ray as noted above.            Critical Care Time  Procedures

## 2024-12-09 NOTE — DISCHARGE INSTRUCTIONS
You were seen in the emergency department for left scapular pain and chest pain.  Your workup was reassuring.  You can take 600 mg of ibuprofen every 6 hours as needed for pain.  It can be beneficial to try ice or heat for up to 10 minutes at a time in addition to stretching.  Is important to follow-up with your primary care doctor to discuss today's visit.  Immediately return to the emergency department if you develop fever, shortness of breath, chest pain, or any new or concerning symptoms.

## 2025-01-23 ENCOUNTER — OFFICE VISIT (OUTPATIENT)
Dept: INTERNAL MEDICINE CLINIC | Facility: CLINIC | Age: 40
End: 2025-01-23
Payer: COMMERCIAL

## 2025-01-23 VITALS
BODY MASS INDEX: 36.1 KG/M2 | HEART RATE: 86 BPM | HEIGHT: 67 IN | OXYGEN SATURATION: 98 % | SYSTOLIC BLOOD PRESSURE: 110 MMHG | TEMPERATURE: 98.3 F | DIASTOLIC BLOOD PRESSURE: 70 MMHG | WEIGHT: 230 LBS

## 2025-01-23 DIAGNOSIS — Z00.00 ANNUAL PHYSICAL EXAM: ICD-10-CM

## 2025-01-23 DIAGNOSIS — G47.33 OSA (OBSTRUCTIVE SLEEP APNEA): ICD-10-CM

## 2025-01-23 DIAGNOSIS — J45.20 MILD INTERMITTENT ASTHMA WITHOUT COMPLICATION: Primary | ICD-10-CM

## 2025-01-23 PROCEDURE — 99213 OFFICE O/P EST LOW 20 MIN: CPT | Performed by: INTERNAL MEDICINE

## 2025-01-23 PROCEDURE — 99395 PREV VISIT EST AGE 18-39: CPT | Performed by: INTERNAL MEDICINE

## 2025-01-23 RX ORDER — ALBUTEROL SULFATE 90 UG/1
2 INHALANT RESPIRATORY (INHALATION) EVERY 6 HOURS PRN
Qty: 6.7 G | Refills: 5 | Status: SHIPPED | OUTPATIENT
Start: 2025-01-23

## 2025-01-23 NOTE — PROGRESS NOTES
Adult Annual Physical  Name: Isai James      : 1985      MRN: [id number]  Encounter Provider: Harry Meehan MD  Encounter Date: 2025   Encounter department: [facility] INTERNAL MEDICINE    Assessment & Plan  Mild intermittent asthma without complication    Orders:    albuterol (PROVENTIL HFA,VENTOLIN HFA) 90 mcg/act inhaler; Inhale 2 puffs every 6 (six) hours as needed for wheezing    Annual physical exam    Orders:    CBC and differential; Future    Comprehensive metabolic panel; Future    Lipid panel; Future    Urinalysis with microscopic; Future    TSH, 3rd generation; Future    JOSE (obstructive sleep apnea)    Orders:    Ambulatory Referral to Sleep Medicine; Future      Immunizations and preventive care screenings were discussed with patient today. Appropriate education was printed on patient's after visit summary.    Counseling:  Exercise: the importance of regular exercise/physical activity was discussed. Recommend exercise 3-5 times per week for at least 30 minutes.          History of Present Illness     Adult Annual Physical:  Patient presents for annual physical.     Diet and Physical Activity:  - Diet/Nutrition: well balanced diet and consuming 3-5 servings of fruits/vegetables daily.  - Exercise: no formal exercise.    Depression Screening:  - PHQ-2 Score: 1    General Health:  - Sleep: 7-8 hours of sleep on average. on CPAP  - Hearing: normal hearing bilateral ears.  - Vision: no vision problems, most recent eye exam < 1 year ago and wears glasses.  - Dental: regular dental visits and brushes teeth three times daily.     Health:  - History of STDs: yes.   - Urinary symptoms: none.     Review of Systems   Constitutional:  Negative for appetite change, chills, fatigue and fever.   HENT:  Negative for sore throat and trouble swallowing.    Eyes:  Negative for redness.   Respiratory:  Negative for shortness of breath.    Cardiovascular:  Negative for chest pain and  palpitations.   Gastrointestinal:  Negative for abdominal pain, constipation and diarrhea.   Genitourinary:  Negative for dysuria and hematuria.   Musculoskeletal:  Negative for back pain and neck pain.   Skin:  Negative for rash.   Neurological:  Negative for seizures, weakness and headaches.   Hematological:  Negative for adenopathy.   Psychiatric/Behavioral:  Negative for confusion. The patient is not nervous/anxious.      Pertinent Medical History         Medical History Reviewed by provider this encounter:  Tobacco  Allergies  Meds  Problems  Med Hx  Surg Hx  Fam Hx     .  Current Outpatient Medications on File Prior to Visit   Medication Sig Dispense Refill    Fluticasone-Salmeterol (Advair) 100-50 mcg/dose inhaler Inhale 1 puff every 12 (twelve) hours Rinse mouth after use. 60 blister 11    LORazepam (ATIVAN) 1 mg tablet Take 1 tablet (1 mg total) by mouth 30 min pre-procedure 1 tablet 0    [DISCONTINUED] albuterol (PROVENTIL HFA,VENTOLIN HFA) 90 mcg/act inhaler INHALE 2 PUFFS EVERY 6 HOURS AS NEEDED FOR WHEEZING 6.7 g 0    methocarbamol (ROBAXIN) 500 mg tablet Take 1 tablet (500 mg total) by mouth 2 (two) times a day for 7 days (Patient not taking: Reported on 2025) 14 tablet 0    [DISCONTINUED] fluticasone-vilanterol (BREO ELLIPTA) 100-25 mcg/inh inhaler Inhale 1 puff daily Rinse mouth after use. 60 blister 0     No current facility-administered medications on file prior to visit.      Social History     Tobacco Use    Smoking status: Former     Current packs/day: 0.00     Average packs/day: 1.5 packs/day for 10.5 years (15.7 ttl pk-yrs)     Types: Cigarettes     Start date: 2012     Quit date: 2022     Years since quittin.5    Smokeless tobacco: Never    Tobacco comments:     Has stopped smoking for about a month, since his hospitalization   Vaping Use    Vaping status: Some Days    Start date: 2022    Substances: Nicotine, Flavoring   Substance and Sexual Activity    Alcohol  "use: Not Currently     Comment: rarely    Drug use: Not Currently    Sexual activity: Not Currently       Objective   /70 (BP Location: Left arm, Patient Position: Sitting, Cuff Size: Large)   Pulse 86   Temp 98.3 °F (36.8 °C) (Oral)   Ht 5' 7.32\" (1.71 m)   Wt 104 kg (230 lb)   SpO2 98% Comment: ra  BMI 35.68 kg/m²     Physical Exam  Vitals and nursing note reviewed.   Constitutional:       General: He is not in acute distress.     Appearance: He is well-developed. He is obese.   HENT:      Head: Normocephalic and atraumatic.      Right Ear: Tympanic membrane normal.      Left Ear: Tympanic membrane normal.   Eyes:      Conjunctiva/sclera: Conjunctivae normal.   Cardiovascular:      Rate and Rhythm: Normal rate and regular rhythm.      Heart sounds: No murmur heard.  Pulmonary:      Effort: Pulmonary effort is normal. No respiratory distress.      Breath sounds: Normal breath sounds.   Abdominal:      Palpations: Abdomen is soft.      Tenderness: There is no abdominal tenderness.   Genitourinary:     Penis: Normal.       Testes: Normal.   Musculoskeletal:         General: No swelling.      Cervical back: Normal range of motion and neck supple.   Skin:     General: Skin is warm and dry.   Neurological:      General: No focal deficit present.      Mental Status: He is alert and oriented to person, place, and time.   Psychiatric:         Mood and Affect: Mood normal.         "

## 2025-01-23 NOTE — PATIENT INSTRUCTIONS
"Patient Education     Routine physical for adults   The Basics   Written by the doctors and editors at [facility]   What is a physical? -- A physical is a routine visit, or \"check-up,\" with your doctor. You might also hear it called a \"wellness visit\" or \"preventive visit.\"  During each visit, the doctor will:   Ask about your physical and mental health   Ask about your habits, behaviors, and lifestyle   Do an exam   Give you vaccines if needed   Talk to you about any medicines you take   Give advice about your health   Answer your questions  Getting regular check-ups is an important part of taking care of your health. It can help your doctor find and treat any problems you have. But it's also important for preventing health problems.  A routine physical is different from a \"sick visit.\" A sick visit is when you see a doctor because of a health concern or problem. Since physicals are scheduled ahead of time, you can think about what you want to ask the doctor.  How often should I get a physical? -- It depends on your age and health. In general, for people age 21 years and older:   If you are younger than 50 years, you might be able to get a physical every 3 years.   If you are 50 years or older, your doctor might recommend a physical every year.  If you have an ongoing health condition, like diabetes or high blood pressure, your doctor will probably want to see you more often.  What happens during a physical? -- In general, each visit will include:   Physical exam - The doctor or nurse will check your height, weight, heart rate, and blood pressure. They will also look at your eyes and ears. They will ask about how you are feeling and whether you have any symptoms that bother you.   Medicines - It's a good idea to bring a list of all the medicines you take to each doctor visit. Your doctor will talk to you about your medicines and answer any questions. Tell them if you are having any side effects that bother you. You " "should also tell them if you are having trouble paying for any of your medicines.   Habits and behaviors - This includes:   Your diet   Your exercise habits   Whether you smoke, drink alcohol, or use drugs   Whether you are sexually active   Whether you feel safe at home  Your doctor will talk to you about things you can do to improve your health and lower your risk of health problems. They will also offer help and support. For example, if you want to quit smoking, they can give you advice and might prescribe medicines. If you want to improve your diet or get more physical activity, they can help you with this, too.   Lab tests, if needed - The tests you get will depend on your age and situation. For example, your doctor might want to check your:   Cholesterol   Blood sugar   Iron level   Vaccines - The recommended vaccines will depend on your age, health, and what vaccines you already had. Vaccines are very important because they can prevent certain serious or deadly infections.   Discussion of screening - \"Screening\" means checking for diseases or other health problems before they cause symptoms. Your doctor can recommend screening based on your age, risk, and preferences. This might include tests to check for:   Cancer, such as breast, prostate, cervical, ovarian, colorectal, prostate, lung, or skin cancer   Sexually transmitted infections, such as chlamydia and gonorrhea   Mental health conditions like depression and anxiety  Your doctor will talk to you about the different types of screening tests. They can help you decide which screenings to have. They can also explain what the results might mean.   Answering questions - The physical is a good time to ask the doctor or nurse questions about your health. If needed, they can refer you to other doctors or specialists, too.  Adults older than 65 years often need other care, too. As you get older, your doctor will talk to you about:   How to prevent falling at " home   Hearing or vision tests   Memory testing   How to take your medicines safely   Making sure that you have the help and support you need at home  All topics are updated as new evidence becomes available and our peer review process is complete.  This topic retrieved from [employer] on: May 02, 2024.  Topic 489676 Version 1.0  Release: 32.4.3 - C32.122  © 2024 UpToDate, Inc. and/or its affiliates. All rights reserved.  Consumer Information Use and Disclaimer   Disclaimer: This generalized information is a limited summary of diagnosis, treatment, and/or medication information. It is not meant to be comprehensive and should be used as a tool to help the user understand and/or assess potential diagnostic and treatment options. It does NOT include all information about conditions, treatments, medications, side effects, or risks that may apply to a specific patient. It is not intended to be medical advice or a substitute for the medical advice, diagnosis, or treatment of a health care provider based on the health care provider's examination and assessment of a patient's specific and unique circumstances. Patients must speak with a health care provider for complete information about their health, medical questions, and treatment options, including any risks or benefits regarding use of medications. This information does not endorse any treatments or medications as safe, effective, or approved for treating a specific patient. Up[employer]Date, Inc. and its affiliates disclaim any warranty or liability relating to this information or the use thereof.The use of this information is governed by the Terms of Use, available at https://www.woltersWikiswayuwer.com/en/know/clinical-effectiveness-terms. 2024© UpToDate, Inc. and its affiliates and/or licensors. All rights reserved.  Copyright   © 2024 Up[employer]Date, [employer]. and/or its affiliates. All rights reserved.

## 2025-04-22 ENCOUNTER — TELEPHONE (OUTPATIENT)
Age: 40
End: 2025-04-22